# Patient Record
Sex: FEMALE | Race: WHITE | Employment: OTHER | URBAN - METROPOLITAN AREA
[De-identification: names, ages, dates, MRNs, and addresses within clinical notes are randomized per-mention and may not be internally consistent; named-entity substitution may affect disease eponyms.]

---

## 2017-04-03 ENCOUNTER — OFFICE VISIT (OUTPATIENT)
Dept: FAMILY MEDICINE CLINIC | Age: 67
End: 2017-04-03

## 2017-04-03 VITALS
HEIGHT: 63 IN | OXYGEN SATURATION: 98 % | DIASTOLIC BLOOD PRESSURE: 70 MMHG | BODY MASS INDEX: 34.2 KG/M2 | HEART RATE: 82 BPM | SYSTOLIC BLOOD PRESSURE: 110 MMHG | WEIGHT: 193 LBS

## 2017-04-03 DIAGNOSIS — E78.5 HYPERLIPIDEMIA, UNSPECIFIED HYPERLIPIDEMIA TYPE: ICD-10-CM

## 2017-04-03 DIAGNOSIS — E03.9 ACQUIRED HYPOTHYROIDISM: ICD-10-CM

## 2017-04-03 DIAGNOSIS — R06.02 SOB (SHORTNESS OF BREATH): Primary | ICD-10-CM

## 2017-04-03 DIAGNOSIS — D86.9 SARCOID: ICD-10-CM

## 2017-04-03 DIAGNOSIS — I10 ESSENTIAL HYPERTENSION: ICD-10-CM

## 2017-04-03 DIAGNOSIS — Z11.59 NEED FOR HEPATITIS C SCREENING TEST: ICD-10-CM

## 2017-04-03 DIAGNOSIS — Z13.1 SCREENING FOR DIABETES MELLITUS (DM): ICD-10-CM

## 2017-04-03 LAB
A/G RATIO: 1.2 (ref 1.1–2.2)
ALBUMIN SERPL-MCNC: 3.9 G/DL (ref 3.4–5)
ALP BLD-CCNC: 93 U/L (ref 40–129)
ALT SERPL-CCNC: 22 U/L (ref 10–40)
ANION GAP SERPL CALCULATED.3IONS-SCNC: 12 MMOL/L (ref 3–16)
AST SERPL-CCNC: 20 U/L (ref 15–37)
BASOPHILS ABSOLUTE: 0.1 K/UL (ref 0–0.2)
BASOPHILS RELATIVE PERCENT: 1 %
BILIRUB SERPL-MCNC: 0.4 MG/DL (ref 0–1)
BUN BLDV-MCNC: 23 MG/DL (ref 7–20)
CALCIUM SERPL-MCNC: 10 MG/DL (ref 8.3–10.6)
CHLORIDE BLD-SCNC: 100 MMOL/L (ref 99–110)
CO2: 28 MMOL/L (ref 21–32)
CREAT SERPL-MCNC: 0.9 MG/DL (ref 0.6–1.2)
EOSINOPHILS ABSOLUTE: 0.3 K/UL (ref 0–0.6)
EOSINOPHILS RELATIVE PERCENT: 3.6 %
GFR AFRICAN AMERICAN: >60
GFR NON-AFRICAN AMERICAN: >60
GLOBULIN: 3.2 G/DL
GLUCOSE BLD-MCNC: 89 MG/DL (ref 70–99)
HBA1C MFR BLD: 5.6 %
HCT VFR BLD CALC: 45.4 % (ref 36–48)
HEMOGLOBIN: 15 G/DL (ref 12–16)
HEPATITIS C ANTIBODY INTERPRETATION: NORMAL
LYMPHOCYTES ABSOLUTE: 3.6 K/UL (ref 1–5.1)
LYMPHOCYTES RELATIVE PERCENT: 39.4 %
MCH RBC QN AUTO: 32 PG (ref 26–34)
MCHC RBC AUTO-ENTMCNC: 32.9 G/DL (ref 31–36)
MCV RBC AUTO: 97 FL (ref 80–100)
MONOCYTES ABSOLUTE: 0.8 K/UL (ref 0–1.3)
MONOCYTES RELATIVE PERCENT: 8.8 %
NEUTROPHILS ABSOLUTE: 4.3 K/UL (ref 1.7–7.7)
NEUTROPHILS RELATIVE PERCENT: 47.2 %
PDW BLD-RTO: 13.5 % (ref 12.4–15.4)
PLATELET # BLD: 342 K/UL (ref 135–450)
PMV BLD AUTO: 8.6 FL (ref 5–10.5)
POTASSIUM SERPL-SCNC: 4.1 MMOL/L (ref 3.5–5.1)
RBC # BLD: 4.68 M/UL (ref 4–5.2)
SODIUM BLD-SCNC: 140 MMOL/L (ref 136–145)
TOTAL PROTEIN: 7.1 G/DL (ref 6.4–8.2)
TROPONIN: <0.01 NG/ML
TSH REFLEX: 2.93 UIU/ML (ref 0.27–4.2)
WBC # BLD: 9.1 K/UL (ref 4–11)

## 2017-04-03 PROCEDURE — 93000 ELECTROCARDIOGRAM COMPLETE: CPT | Performed by: FAMILY MEDICINE

## 2017-04-03 PROCEDURE — 1036F TOBACCO NON-USER: CPT | Performed by: FAMILY MEDICINE

## 2017-04-03 PROCEDURE — G8427 DOCREV CUR MEDS BY ELIG CLIN: HCPCS | Performed by: FAMILY MEDICINE

## 2017-04-03 PROCEDURE — 3017F COLORECTAL CA SCREEN DOC REV: CPT | Performed by: FAMILY MEDICINE

## 2017-04-03 PROCEDURE — G8417 CALC BMI ABV UP PARAM F/U: HCPCS | Performed by: FAMILY MEDICINE

## 2017-04-03 PROCEDURE — 3014F SCREEN MAMMO DOC REV: CPT | Performed by: FAMILY MEDICINE

## 2017-04-03 PROCEDURE — 1090F PRES/ABSN URINE INCON ASSESS: CPT | Performed by: FAMILY MEDICINE

## 2017-04-03 PROCEDURE — 99203 OFFICE O/P NEW LOW 30 MIN: CPT | Performed by: FAMILY MEDICINE

## 2017-04-03 PROCEDURE — 1123F ACP DISCUSS/DSCN MKR DOCD: CPT | Performed by: FAMILY MEDICINE

## 2017-04-03 PROCEDURE — G8400 PT W/DXA NO RESULTS DOC: HCPCS | Performed by: FAMILY MEDICINE

## 2017-04-03 PROCEDURE — 83036 HEMOGLOBIN GLYCOSYLATED A1C: CPT | Performed by: FAMILY MEDICINE

## 2017-04-03 PROCEDURE — 4040F PNEUMOC VAC/ADMIN/RCVD: CPT | Performed by: FAMILY MEDICINE

## 2017-04-03 RX ORDER — NAPROXEN 500 MG/1
500 TABLET ORAL 2 TIMES DAILY WITH MEALS
COMMUNITY
End: 2019-01-08 | Stop reason: ALTCHOICE

## 2017-04-03 RX ORDER — MONTELUKAST SODIUM 10 MG/1
10 TABLET ORAL NIGHTLY
COMMUNITY
End: 2017-08-15 | Stop reason: SDUPTHER

## 2017-04-03 RX ORDER — SIMVASTATIN 20 MG
20 TABLET ORAL NIGHTLY
Qty: 90 TABLET | Refills: 3 | Status: SHIPPED | OUTPATIENT
Start: 2017-04-03 | End: 2018-03-30 | Stop reason: SDUPTHER

## 2017-04-03 RX ORDER — BUDESONIDE AND FORMOTEROL FUMARATE DIHYDRATE 80; 4.5 UG/1; UG/1
2 AEROSOL RESPIRATORY (INHALATION) 2 TIMES DAILY
Qty: 3 INHALER | Refills: 5 | Status: SHIPPED | OUTPATIENT
Start: 2017-04-03 | End: 2017-07-14 | Stop reason: ALTCHOICE

## 2017-04-03 RX ORDER — LEVOTHYROXINE SODIUM 0.07 MG/1
75 TABLET ORAL DAILY
Qty: 90 TABLET | Refills: 3 | Status: SHIPPED | OUTPATIENT
Start: 2017-04-03 | End: 2018-01-21 | Stop reason: SDUPTHER

## 2017-04-03 RX ORDER — ALBUTEROL SULFATE 90 UG/1
2 AEROSOL, METERED RESPIRATORY (INHALATION) EVERY 6 HOURS PRN
Qty: 1 INHALER | Refills: 3 | Status: SHIPPED | OUTPATIENT
Start: 2017-04-03 | End: 2017-07-14 | Stop reason: ALTCHOICE

## 2017-04-03 ASSESSMENT — ENCOUNTER SYMPTOMS: SHORTNESS OF BREATH: 1

## 2017-04-04 ENCOUNTER — HOSPITAL ENCOUNTER (OUTPATIENT)
Dept: NON INVASIVE DIAGNOSTICS | Age: 67
Discharge: HOME | End: 2017-04-04
Attending: FAMILY MEDICINE | Admitting: FAMILY MEDICINE

## 2017-04-04 DIAGNOSIS — R06.02 SHORTNESS OF BREATH: ICD-10-CM

## 2017-04-26 ENCOUNTER — OFFICE VISIT (OUTPATIENT)
Dept: FAMILY MEDICINE CLINIC | Age: 67
End: 2017-04-26

## 2017-04-26 VITALS
SYSTOLIC BLOOD PRESSURE: 124 MMHG | DIASTOLIC BLOOD PRESSURE: 80 MMHG | WEIGHT: 194 LBS | OXYGEN SATURATION: 96 % | HEART RATE: 67 BPM | BODY MASS INDEX: 34.37 KG/M2

## 2017-04-26 DIAGNOSIS — F33.2 SEVERE EPISODE OF RECURRENT MAJOR DEPRESSIVE DISORDER, WITHOUT PSYCHOTIC FEATURES (HCC): ICD-10-CM

## 2017-04-26 DIAGNOSIS — D86.9 SARCOID: ICD-10-CM

## 2017-04-26 DIAGNOSIS — Z78.0 ASYMPTOMATIC MENOPAUSAL STATE: ICD-10-CM

## 2017-04-26 DIAGNOSIS — E03.9 ACQUIRED HYPOTHYROIDISM: ICD-10-CM

## 2017-04-26 DIAGNOSIS — I10 ESSENTIAL HYPERTENSION: Primary | ICD-10-CM

## 2017-04-26 DIAGNOSIS — E78.5 HYPERLIPIDEMIA, UNSPECIFIED HYPERLIPIDEMIA TYPE: ICD-10-CM

## 2017-04-26 DIAGNOSIS — Z23 NEED FOR STREPTOCOCCUS PNEUMONIAE AND INFLUENZA VACCINATION: ICD-10-CM

## 2017-04-26 LAB
CHOLESTEROL, TOTAL: 179 MG/DL (ref 0–199)
HDLC SERPL-MCNC: 55 MG/DL (ref 40–60)
LDL CHOLESTEROL CALCULATED: 91 MG/DL
TRIGL SERPL-MCNC: 165 MG/DL (ref 0–150)
VLDLC SERPL CALC-MCNC: 33 MG/DL

## 2017-04-26 PROCEDURE — 90670 PCV13 VACCINE IM: CPT | Performed by: FAMILY MEDICINE

## 2017-04-26 PROCEDURE — 1036F TOBACCO NON-USER: CPT | Performed by: FAMILY MEDICINE

## 2017-04-26 PROCEDURE — 4040F PNEUMOC VAC/ADMIN/RCVD: CPT | Performed by: FAMILY MEDICINE

## 2017-04-26 PROCEDURE — 1090F PRES/ABSN URINE INCON ASSESS: CPT | Performed by: FAMILY MEDICINE

## 2017-04-26 PROCEDURE — 3017F COLORECTAL CA SCREEN DOC REV: CPT | Performed by: FAMILY MEDICINE

## 2017-04-26 PROCEDURE — G8428 CUR MEDS NOT DOCUMENT: HCPCS | Performed by: FAMILY MEDICINE

## 2017-04-26 PROCEDURE — 99214 OFFICE O/P EST MOD 30 MIN: CPT | Performed by: FAMILY MEDICINE

## 2017-04-26 PROCEDURE — G0009 ADMIN PNEUMOCOCCAL VACCINE: HCPCS | Performed by: FAMILY MEDICINE

## 2017-04-26 PROCEDURE — G8417 CALC BMI ABV UP PARAM F/U: HCPCS | Performed by: FAMILY MEDICINE

## 2017-04-26 PROCEDURE — 3014F SCREEN MAMMO DOC REV: CPT | Performed by: FAMILY MEDICINE

## 2017-04-26 PROCEDURE — 1123F ACP DISCUSS/DSCN MKR DOCD: CPT | Performed by: FAMILY MEDICINE

## 2017-04-26 PROCEDURE — G8400 PT W/DXA NO RESULTS DOC: HCPCS | Performed by: FAMILY MEDICINE

## 2017-04-26 RX ORDER — ESCITALOPRAM OXALATE 10 MG/1
10 TABLET ORAL DAILY
Qty: 90 TABLET | Refills: 3 | Status: SHIPPED | OUTPATIENT
Start: 2017-04-26 | End: 2018-04-21 | Stop reason: SDUPTHER

## 2017-04-26 RX ORDER — LOSARTAN POTASSIUM AND HYDROCHLOROTHIAZIDE 25; 100 MG/1; MG/1
1 TABLET ORAL DAILY
COMMUNITY
End: 2017-04-26 | Stop reason: SDUPTHER

## 2017-04-26 RX ORDER — LOSARTAN POTASSIUM AND HYDROCHLOROTHIAZIDE 25; 100 MG/1; MG/1
1 TABLET ORAL DAILY
Qty: 90 TABLET | Refills: 3 | Status: SHIPPED | OUTPATIENT
Start: 2017-04-26 | End: 2018-04-21 | Stop reason: SDUPTHER

## 2017-04-26 RX ORDER — OMEPRAZOLE 20 MG/1
20 CAPSULE, DELAYED RELEASE ORAL DAILY
COMMUNITY

## 2017-04-26 ASSESSMENT — PATIENT HEALTH QUESTIONNAIRE - PHQ9
SUM OF ALL RESPONSES TO PHQ9 QUESTIONS 1 & 2: 0
1. LITTLE INTEREST OR PLEASURE IN DOING THINGS: 0
2. FEELING DOWN, DEPRESSED OR HOPELESS: 0
SUM OF ALL RESPONSES TO PHQ QUESTIONS 1-9: 0

## 2017-04-26 ASSESSMENT — ENCOUNTER SYMPTOMS: SHORTNESS OF BREATH: 1

## 2017-05-11 ENCOUNTER — TELEPHONE (OUTPATIENT)
Dept: PULMONOLOGY | Age: 67
End: 2017-05-11

## 2017-05-11 DIAGNOSIS — D86.9 SARCOID: Primary | ICD-10-CM

## 2017-05-12 ENCOUNTER — HOSPITAL ENCOUNTER (OUTPATIENT)
Dept: MAMMOGRAPHY | Age: 67
Discharge: HOME | End: 2017-05-12
Attending: FAMILY MEDICINE | Admitting: FAMILY MEDICINE

## 2017-05-12 DIAGNOSIS — Z12.31 ENCOUNTER FOR SCREENING MAMMOGRAM FOR BREAST CANCER: ICD-10-CM

## 2017-06-12 ENCOUNTER — HOSPITAL ENCOUNTER (OUTPATIENT)
Dept: PULMONOLOGY | Age: 67
Discharge: HOME | End: 2017-06-12
Attending: INTERNAL MEDICINE | Admitting: INTERNAL MEDICINE

## 2017-06-12 DIAGNOSIS — D86.9 SARCOID: ICD-10-CM

## 2017-06-12 DIAGNOSIS — D86.9 SARCOIDOSIS: ICD-10-CM

## 2017-06-12 RX ORDER — ALBUTEROL SULFATE 90 UG/1
6 AEROSOL, METERED RESPIRATORY (INHALATION) ONCE
Status: COMPLETED | OUTPATIENT
Start: 2017-06-12 | End: 2017-06-12

## 2017-06-12 RX ADMIN — ALBUTEROL SULFATE 6 PUFF: 90 AEROSOL, METERED RESPIRATORY (INHALATION) at 10:57

## 2017-06-19 ENCOUNTER — OFFICE VISIT (OUTPATIENT)
Dept: PULMONOLOGY | Age: 67
End: 2017-06-19

## 2017-06-19 VITALS
SYSTOLIC BLOOD PRESSURE: 124 MMHG | HEIGHT: 63 IN | RESPIRATION RATE: 16 BRPM | BODY MASS INDEX: 34.2 KG/M2 | WEIGHT: 193 LBS | TEMPERATURE: 98 F | HEART RATE: 68 BPM | OXYGEN SATURATION: 98 % | DIASTOLIC BLOOD PRESSURE: 86 MMHG

## 2017-06-19 DIAGNOSIS — R06.00 DYSPNEA, UNSPECIFIED TYPE: ICD-10-CM

## 2017-06-19 DIAGNOSIS — D86.9 SARCOIDOSIS: Primary | ICD-10-CM

## 2017-06-19 DIAGNOSIS — D86.9 SARCOID: ICD-10-CM

## 2017-06-19 PROCEDURE — 99204 OFFICE O/P NEW MOD 45 MIN: CPT | Performed by: INTERNAL MEDICINE

## 2017-06-19 PROCEDURE — G8400 PT W/DXA NO RESULTS DOC: HCPCS | Performed by: INTERNAL MEDICINE

## 2017-06-19 PROCEDURE — 4040F PNEUMOC VAC/ADMIN/RCVD: CPT | Performed by: INTERNAL MEDICINE

## 2017-06-19 PROCEDURE — 1090F PRES/ABSN URINE INCON ASSESS: CPT | Performed by: INTERNAL MEDICINE

## 2017-06-19 PROCEDURE — 1123F ACP DISCUSS/DSCN MKR DOCD: CPT | Performed by: INTERNAL MEDICINE

## 2017-06-19 PROCEDURE — 1036F TOBACCO NON-USER: CPT | Performed by: INTERNAL MEDICINE

## 2017-06-19 PROCEDURE — 3017F COLORECTAL CA SCREEN DOC REV: CPT | Performed by: INTERNAL MEDICINE

## 2017-06-19 PROCEDURE — G8427 DOCREV CUR MEDS BY ELIG CLIN: HCPCS | Performed by: INTERNAL MEDICINE

## 2017-06-19 PROCEDURE — 3014F SCREEN MAMMO DOC REV: CPT | Performed by: INTERNAL MEDICINE

## 2017-06-19 PROCEDURE — G8417 CALC BMI ABV UP PARAM F/U: HCPCS | Performed by: INTERNAL MEDICINE

## 2017-06-23 ENCOUNTER — HOSPITAL ENCOUNTER (OUTPATIENT)
Dept: CT IMAGING | Age: 67
Discharge: HOME | End: 2017-06-23
Attending: INTERNAL MEDICINE | Admitting: INTERNAL MEDICINE

## 2017-06-23 DIAGNOSIS — D86.9 SARCOIDOSIS: ICD-10-CM

## 2017-06-23 LAB
ANION GAP SERPL CALCULATED.3IONS-SCNC: 12 MMOL/L (ref 3–16)
BUN BLDV-MCNC: 22 MG/DL (ref 7–20)
CALCIUM SERPL-MCNC: 9.3 MG/DL (ref 8.3–10.6)
CHLORIDE BLD-SCNC: 100 MMOL/L (ref 99–110)
CO2: 29 MMOL/L (ref 21–32)
CREAT SERPL-MCNC: 0.7 MG/DL (ref 0.6–1.2)
GFR AFRICAN AMERICAN: >60
GFR NON-AFRICAN AMERICAN: >60
GLUCOSE BLD-MCNC: 96 MG/DL (ref 70–99)
POTASSIUM SERPL-SCNC: 3.7 MMOL/L (ref 3.5–5.1)
SODIUM BLD-SCNC: 141 MMOL/L (ref 136–145)

## 2017-07-06 ENCOUNTER — OFFICE VISIT (OUTPATIENT)
Dept: PULMONOLOGY | Age: 67
End: 2017-07-06

## 2017-07-06 VITALS
TEMPERATURE: 98.7 F | SYSTOLIC BLOOD PRESSURE: 122 MMHG | RESPIRATION RATE: 16 BRPM | DIASTOLIC BLOOD PRESSURE: 88 MMHG | WEIGHT: 193 LBS | OXYGEN SATURATION: 97 % | BODY MASS INDEX: 34.2 KG/M2 | HEIGHT: 63 IN | HEART RATE: 75 BPM

## 2017-07-06 DIAGNOSIS — D86.9 SARCOIDOSIS: Primary | ICD-10-CM

## 2017-07-06 DIAGNOSIS — R06.00 DYSPNEA, UNSPECIFIED TYPE: ICD-10-CM

## 2017-07-06 PROCEDURE — G8417 CALC BMI ABV UP PARAM F/U: HCPCS | Performed by: INTERNAL MEDICINE

## 2017-07-06 PROCEDURE — 99213 OFFICE O/P EST LOW 20 MIN: CPT | Performed by: INTERNAL MEDICINE

## 2017-07-06 PROCEDURE — 3014F SCREEN MAMMO DOC REV: CPT | Performed by: INTERNAL MEDICINE

## 2017-07-06 PROCEDURE — 1123F ACP DISCUSS/DSCN MKR DOCD: CPT | Performed by: INTERNAL MEDICINE

## 2017-07-06 PROCEDURE — 3017F COLORECTAL CA SCREEN DOC REV: CPT | Performed by: INTERNAL MEDICINE

## 2017-07-06 PROCEDURE — 1036F TOBACCO NON-USER: CPT | Performed by: INTERNAL MEDICINE

## 2017-07-06 PROCEDURE — 1090F PRES/ABSN URINE INCON ASSESS: CPT | Performed by: INTERNAL MEDICINE

## 2017-07-06 PROCEDURE — G8427 DOCREV CUR MEDS BY ELIG CLIN: HCPCS | Performed by: INTERNAL MEDICINE

## 2017-07-06 PROCEDURE — 4040F PNEUMOC VAC/ADMIN/RCVD: CPT | Performed by: INTERNAL MEDICINE

## 2017-07-06 PROCEDURE — G8400 PT W/DXA NO RESULTS DOC: HCPCS | Performed by: INTERNAL MEDICINE

## 2017-07-27 ENCOUNTER — HOSPITAL ENCOUNTER (OUTPATIENT)
Dept: MRI IMAGING | Age: 67
Discharge: HOME | End: 2017-07-27
Attending: PHYSICAL MEDICINE & REHABILITATION | Admitting: PHYSICAL MEDICINE & REHABILITATION

## 2017-07-27 DIAGNOSIS — M48.061 LUMBAR STENOSIS: ICD-10-CM

## 2017-07-27 DIAGNOSIS — M54.40 CHRONIC LOW BACK PAIN WITH SCIATICA, SCIATICA LATERALITY UNSPECIFIED, UNSPECIFIED BACK PAIN LATERALITY: ICD-10-CM

## 2017-07-27 DIAGNOSIS — M54.50 LOW BACK PAIN: ICD-10-CM

## 2017-07-27 DIAGNOSIS — M54.16 LUMBAR RADICULOPATHY: ICD-10-CM

## 2017-07-27 DIAGNOSIS — G89.29 CHRONIC LOW BACK PAIN WITH SCIATICA, SCIATICA LATERALITY UNSPECIFIED, UNSPECIFIED BACK PAIN LATERALITY: ICD-10-CM

## 2017-08-15 ENCOUNTER — HOSPITAL ENCOUNTER (OUTPATIENT)
Dept: VASCULAR LAB | Age: 67
Discharge: HOME | End: 2017-08-15
Attending: FAMILY MEDICINE | Admitting: FAMILY MEDICINE

## 2017-08-15 ENCOUNTER — OFFICE VISIT (OUTPATIENT)
Dept: FAMILY MEDICINE CLINIC | Age: 67
End: 2017-08-15

## 2017-08-15 VITALS
RESPIRATION RATE: 12 BRPM | HEIGHT: 63 IN | DIASTOLIC BLOOD PRESSURE: 82 MMHG | SYSTOLIC BLOOD PRESSURE: 126 MMHG | BODY MASS INDEX: 33.63 KG/M2 | HEART RATE: 71 BPM | WEIGHT: 189.8 LBS | OXYGEN SATURATION: 97 %
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
## 2017-08-15 DIAGNOSIS — M79.89 OTHER DISORDERS OF SOFT TISSUE: ICD-10-CM

## 2017-08-15 DIAGNOSIS — M79.89 SWELLING OF THIGH: Primary | ICD-10-CM

## 2017-08-15 DIAGNOSIS — Z23 NEED FOR INFLUENZA VACCINATION: ICD-10-CM

## 2017-08-15 PROCEDURE — 1123F ACP DISCUSS/DSCN MKR DOCD: CPT | Performed by: FAMILY MEDICINE

## 2017-08-15 PROCEDURE — 90662 IIV NO PRSV INCREASED AG IM: CPT | Performed by: FAMILY MEDICINE

## 2017-08-15 PROCEDURE — 99214 OFFICE O/P EST MOD 30 MIN: CPT | Performed by: FAMILY MEDICINE

## 2017-08-15 PROCEDURE — 3017F COLORECTAL CA SCREEN DOC REV: CPT | Performed by: FAMILY MEDICINE

## 2017-08-15 PROCEDURE — G8417 CALC BMI ABV UP PARAM F/U: HCPCS | Performed by: FAMILY MEDICINE

## 2017-08-15 PROCEDURE — G0008 ADMIN INFLUENZA VIRUS VAC: HCPCS | Performed by: FAMILY MEDICINE

## 2017-08-15 PROCEDURE — 1036F TOBACCO NON-USER: CPT | Performed by: FAMILY MEDICINE

## 2017-08-15 PROCEDURE — G8400 PT W/DXA NO RESULTS DOC: HCPCS | Performed by: FAMILY MEDICINE

## 2017-08-15 PROCEDURE — G8427 DOCREV CUR MEDS BY ELIG CLIN: HCPCS | Performed by: FAMILY MEDICINE

## 2017-08-15 PROCEDURE — 4040F PNEUMOC VAC/ADMIN/RCVD: CPT | Performed by: FAMILY MEDICINE

## 2017-08-15 PROCEDURE — 1090F PRES/ABSN URINE INCON ASSESS: CPT | Performed by: FAMILY MEDICINE

## 2017-08-15 PROCEDURE — 3014F SCREEN MAMMO DOC REV: CPT | Performed by: FAMILY MEDICINE

## 2017-08-15 RX ORDER — FUROSEMIDE 40 MG/1
40 TABLET ORAL DAILY
Qty: 7 TABLET | Refills: 0 | Status: SHIPPED | OUTPATIENT
Start: 2017-08-15 | End: 2018-08-17 | Stop reason: ALTCHOICE

## 2017-08-15 RX ORDER — MONTELUKAST SODIUM 10 MG/1
10 TABLET ORAL NIGHTLY
Qty: 90 TABLET | Refills: 0 | Status: SHIPPED | OUTPATIENT
Start: 2017-08-15 | End: 2017-11-13 | Stop reason: SDUPTHER

## 2017-08-25 ENCOUNTER — OFFICE VISIT (OUTPATIENT)
Dept: FAMILY MEDICINE CLINIC | Age: 67
End: 2017-08-25

## 2017-08-25 VITALS
BODY MASS INDEX: 33.88 KG/M2 | HEIGHT: 63 IN | DIASTOLIC BLOOD PRESSURE: 76 MMHG | SYSTOLIC BLOOD PRESSURE: 120 MMHG | OXYGEN SATURATION: 96 % | RESPIRATION RATE: 16 BRPM | HEART RATE: 76 BPM | WEIGHT: 191.2 LBS

## 2017-08-25 DIAGNOSIS — Z12.11 SCREENING FOR COLON CANCER: Primary | ICD-10-CM

## 2017-08-25 DIAGNOSIS — M79.89 SWELLING OF THIGH: ICD-10-CM

## 2017-08-25 PROCEDURE — 3014F SCREEN MAMMO DOC REV: CPT | Performed by: FAMILY MEDICINE

## 2017-08-25 PROCEDURE — 1123F ACP DISCUSS/DSCN MKR DOCD: CPT | Performed by: FAMILY MEDICINE

## 2017-08-25 PROCEDURE — 1036F TOBACCO NON-USER: CPT | Performed by: FAMILY MEDICINE

## 2017-08-25 PROCEDURE — 3017F COLORECTAL CA SCREEN DOC REV: CPT | Performed by: FAMILY MEDICINE

## 2017-08-25 PROCEDURE — 99213 OFFICE O/P EST LOW 20 MIN: CPT | Performed by: FAMILY MEDICINE

## 2017-08-25 PROCEDURE — 1090F PRES/ABSN URINE INCON ASSESS: CPT | Performed by: FAMILY MEDICINE

## 2017-08-25 PROCEDURE — G8427 DOCREV CUR MEDS BY ELIG CLIN: HCPCS | Performed by: FAMILY MEDICINE

## 2017-08-25 PROCEDURE — 4040F PNEUMOC VAC/ADMIN/RCVD: CPT | Performed by: FAMILY MEDICINE

## 2017-08-25 PROCEDURE — G8400 PT W/DXA NO RESULTS DOC: HCPCS | Performed by: FAMILY MEDICINE

## 2017-08-25 PROCEDURE — G8417 CALC BMI ABV UP PARAM F/U: HCPCS | Performed by: FAMILY MEDICINE

## 2017-08-25 RX ORDER — FUROSEMIDE 40 MG/1
40 TABLET ORAL DAILY
Qty: 7 TABLET | Refills: 0 | Status: CANCELLED | OUTPATIENT
Start: 2017-08-25

## 2017-09-26 ENCOUNTER — OFFICE VISIT (OUTPATIENT)
Dept: FAMILY MEDICINE CLINIC | Age: 67
End: 2017-09-26

## 2017-09-26 VITALS
BODY MASS INDEX: 33.66 KG/M2 | TEMPERATURE: 98 F | WEIGHT: 190 LBS | DIASTOLIC BLOOD PRESSURE: 70 MMHG | OXYGEN SATURATION: 97 % | SYSTOLIC BLOOD PRESSURE: 134 MMHG | HEART RATE: 66 BPM | HEIGHT: 63 IN

## 2017-09-26 DIAGNOSIS — Z00.00 ROUTINE GENERAL MEDICAL EXAMINATION AT A HEALTH CARE FACILITY: Primary | ICD-10-CM

## 2017-09-26 PROCEDURE — G0438 PPPS, INITIAL VISIT: HCPCS | Performed by: FAMILY MEDICINE

## 2017-09-26 ASSESSMENT — PATIENT HEALTH QUESTIONNAIRE - PHQ9
SUM OF ALL RESPONSES TO PHQ9 QUESTIONS 1 & 2: 0
1. LITTLE INTEREST OR PLEASURE IN DOING THINGS: 0
SUM OF ALL RESPONSES TO PHQ QUESTIONS 1-9: 0
2. FEELING DOWN, DEPRESSED OR HOPELESS: 0

## 2017-11-13 RX ORDER — MONTELUKAST SODIUM 10 MG/1
TABLET ORAL
Qty: 90 TABLET | Refills: 0 | Status: SHIPPED | OUTPATIENT
Start: 2017-11-13 | End: 2018-02-11 | Stop reason: SDUPTHER

## 2017-11-13 NOTE — TELEPHONE ENCOUNTER
Last office visit 9/26/2017     Last written 8/15/17       Next office visit scheduled Visit date not found    Requested Prescriptions     Pending Prescriptions Disp Refills    montelukast (SINGULAIR) 10 MG tablet [Pharmacy Med Name: MONTELUKAST SOD TABS 10MG] 90 tablet 0     Sig: TAKE 1 TABLET NIGHTLY

## 2018-01-22 RX ORDER — LEVOTHYROXINE SODIUM 0.07 MG/1
TABLET ORAL
Qty: 90 TABLET | Refills: 3 | Status: SHIPPED | OUTPATIENT
Start: 2018-01-22 | End: 2019-01-16 | Stop reason: SDUPTHER

## 2018-02-12 RX ORDER — MONTELUKAST SODIUM 10 MG/1
TABLET ORAL
Qty: 90 TABLET | Refills: 0 | Status: SHIPPED | OUTPATIENT
Start: 2018-02-12 | End: 2018-05-12 | Stop reason: SDUPTHER

## 2018-02-12 NOTE — TELEPHONE ENCOUNTER
Last office visit 9/26/2017     Last written 11/13/17     Next office visit scheduled Visit date not found    Requested Prescriptions     Pending Prescriptions Disp Refills    montelukast (SINGULAIR) 10 MG tablet [Pharmacy Med Name: MONTELUKAST SOD TABS 10MG] 90 tablet 0     Sig: TAKE 1 TABLET NIGHTLY

## 2018-02-26 ENCOUNTER — OFFICE VISIT (OUTPATIENT)
Dept: ORTHOPEDIC SURGERY | Age: 68
End: 2018-02-26

## 2018-02-26 VITALS
DIASTOLIC BLOOD PRESSURE: 70 MMHG | BODY MASS INDEX: 32.78 KG/M2 | HEIGHT: 63 IN | WEIGHT: 185 LBS | SYSTOLIC BLOOD PRESSURE: 130 MMHG

## 2018-02-26 DIAGNOSIS — M75.42 ROTATOR CUFF IMPINGEMENT SYNDROME OF LEFT SHOULDER: ICD-10-CM

## 2018-02-26 DIAGNOSIS — D49.9 NEOPLASIA: ICD-10-CM

## 2018-02-26 DIAGNOSIS — M25.512 ACUTE PAIN OF LEFT SHOULDER: Primary | ICD-10-CM

## 2018-02-26 PROBLEM — G89.29 CHRONIC LEFT SHOULDER PAIN: Status: ACTIVE | Noted: 2018-02-26

## 2018-02-26 PROCEDURE — G8400 PT W/DXA NO RESULTS DOC: HCPCS | Performed by: ORTHOPAEDIC SURGERY

## 2018-02-26 PROCEDURE — 4040F PNEUMOC VAC/ADMIN/RCVD: CPT | Performed by: ORTHOPAEDIC SURGERY

## 2018-02-26 PROCEDURE — G8484 FLU IMMUNIZE NO ADMIN: HCPCS | Performed by: ORTHOPAEDIC SURGERY

## 2018-02-26 PROCEDURE — 99203 OFFICE O/P NEW LOW 30 MIN: CPT | Performed by: ORTHOPAEDIC SURGERY

## 2018-02-26 PROCEDURE — 1090F PRES/ABSN URINE INCON ASSESS: CPT | Performed by: ORTHOPAEDIC SURGERY

## 2018-02-26 PROCEDURE — G8417 CALC BMI ABV UP PARAM F/U: HCPCS | Performed by: ORTHOPAEDIC SURGERY

## 2018-02-26 PROCEDURE — 1036F TOBACCO NON-USER: CPT | Performed by: ORTHOPAEDIC SURGERY

## 2018-02-26 PROCEDURE — 3014F SCREEN MAMMO DOC REV: CPT | Performed by: ORTHOPAEDIC SURGERY

## 2018-02-26 PROCEDURE — 1123F ACP DISCUSS/DSCN MKR DOCD: CPT | Performed by: ORTHOPAEDIC SURGERY

## 2018-02-26 PROCEDURE — G8427 DOCREV CUR MEDS BY ELIG CLIN: HCPCS | Performed by: ORTHOPAEDIC SURGERY

## 2018-02-26 PROCEDURE — 3017F COLORECTAL CA SCREEN DOC REV: CPT | Performed by: ORTHOPAEDIC SURGERY

## 2018-02-26 RX ORDER — MAGNESIUM 30 MG
30 TABLET ORAL 2 TIMES DAILY
COMMUNITY
End: 2022-10-26

## 2018-02-26 NOTE — LETTER
 escitalopram (LEXAPRO) 10 MG tablet Take 1 tablet by mouth daily 90 tablet 3    aspirin 81 MG tablet Take 81 mg by mouth daily      Misc Natural Products (FIBER 7 PO) Take by mouth      simvastatin (ZOCOR) 20 MG tablet Take 1 tablet by mouth nightly 90 tablet 3    naproxen (NAPROSYN) 500 MG tablet Take 500 mg by mouth 2 times daily (with meals)       No current facility-administered medications for this visit. Social    Social History     Social History    Marital status:      Spouse name: N/A    Number of children: N/A    Years of education: N/A     Occupational History    Not on file. Social History Main Topics    Smoking status: Never Smoker    Smokeless tobacco: Never Used    Alcohol use No    Drug use: No    Sexual activity: Not on file     Other Topics Concern    Not on file     Social History Narrative    No narrative on file       Family HISTORY    Family History   Problem Relation Age of Onset    Hypertension Mother     Diabetes Father     Cancer Son      testicular    Asthma Neg Hx     Emphysema Neg Hx     Heart Failure Neg Hx        PHYSICAL EXAM    Vital Signs:  /70   Ht 5' 3\" (1.6 m)   Wt 185 lb (83.9 kg)   LMP  (LMP Unknown)   BMI 32.77 kg/m²    General Appearance:  Normal body habitus. Alert and oriented to person, place, and time. Affect:  Normal.   Skin:  Intact. Sensation:  Intact. Strength:  Intact. Reflexes:  Intact. Pulses:  Intact. Left Shoulder Exam  She has a full range of motion of the neck. Examination of the shoulder reveals:  Pain to palpation over the distal clavicle at the a.c. Joint without deformity. She has no tenderness over the proximal biceps. She has a full active range of motion of the elbow, wrist and hand. Range of motion  (in degrees)   Right Left   ABDUCTION       EXT. ROTATION       INT.  ROTATION       FORWARD FLEX    STRENGTH         Provocative Test Positive Negative Not indicated

## 2018-03-15 ENCOUNTER — OFFICE VISIT (OUTPATIENT)
Dept: ORTHOPEDIC SURGERY | Age: 68
End: 2018-03-15

## 2018-03-15 VITALS
HEART RATE: 72 BPM | BODY MASS INDEX: 32.77 KG/M2 | DIASTOLIC BLOOD PRESSURE: 72 MMHG | SYSTOLIC BLOOD PRESSURE: 117 MMHG | HEIGHT: 63 IN | WEIGHT: 184.97 LBS

## 2018-03-15 DIAGNOSIS — M19.019 ACROMIOCLAVICULAR JOINT ARTHRITIS: ICD-10-CM

## 2018-03-15 DIAGNOSIS — M25.512 LEFT SHOULDER PAIN, UNSPECIFIED CHRONICITY: Primary | ICD-10-CM

## 2018-03-15 PROCEDURE — 1036F TOBACCO NON-USER: CPT | Performed by: ORTHOPAEDIC SURGERY

## 2018-03-15 PROCEDURE — 3017F COLORECTAL CA SCREEN DOC REV: CPT | Performed by: ORTHOPAEDIC SURGERY

## 2018-03-15 PROCEDURE — 3014F SCREEN MAMMO DOC REV: CPT | Performed by: ORTHOPAEDIC SURGERY

## 2018-03-15 PROCEDURE — 1123F ACP DISCUSS/DSCN MKR DOCD: CPT | Performed by: ORTHOPAEDIC SURGERY

## 2018-03-15 PROCEDURE — G8427 DOCREV CUR MEDS BY ELIG CLIN: HCPCS | Performed by: ORTHOPAEDIC SURGERY

## 2018-03-15 PROCEDURE — 1090F PRES/ABSN URINE INCON ASSESS: CPT | Performed by: ORTHOPAEDIC SURGERY

## 2018-03-15 PROCEDURE — G8417 CALC BMI ABV UP PARAM F/U: HCPCS | Performed by: ORTHOPAEDIC SURGERY

## 2018-03-15 PROCEDURE — G8482 FLU IMMUNIZE ORDER/ADMIN: HCPCS | Performed by: ORTHOPAEDIC SURGERY

## 2018-03-15 PROCEDURE — 4040F PNEUMOC VAC/ADMIN/RCVD: CPT | Performed by: ORTHOPAEDIC SURGERY

## 2018-03-15 PROCEDURE — 99213 OFFICE O/P EST LOW 20 MIN: CPT | Performed by: ORTHOPAEDIC SURGERY

## 2018-03-15 PROCEDURE — G8400 PT W/DXA NO RESULTS DOC: HCPCS | Performed by: ORTHOPAEDIC SURGERY

## 2018-03-15 RX ORDER — METHYLPREDNISOLONE 4 MG/1
TABLET ORAL
Qty: 1 KIT | Refills: 0 | Status: SHIPPED | OUTPATIENT
Start: 2018-03-15 | End: 2018-08-17 | Stop reason: ALTCHOICE

## 2018-03-15 RX ORDER — MELOXICAM 15 MG/1
15 TABLET ORAL DAILY
Qty: 30 TABLET | Refills: 3 | Status: SHIPPED | OUTPATIENT
Start: 2018-03-15 | End: 2018-08-17

## 2018-03-15 NOTE — PROGRESS NOTES
I am evaluating this patient as a consult at the request of Maru Alberts M.D. Chief Complaint:  Shoulder Pain (LEFT SHOULDER REF BY CD)      History of Present Illness:  Frieda Peters is a  79 y.o. right hand dominant female here regarding left shoulder pain, she sustained a fall walking out of her house in January landing directly on the left arm and shoulder, since then she has had superior pain at the a.c. joint, she denies loss of motion or strength, she has not had any conservative treatment so far. She does receive steroid injections to her back which is been helpful, she has had a steroid injection to her knee which has not helped. She is currently retired, enjoys [employer], fishing and playing [employer], she was referred to discuss treatment options with me by Dr. Compa Anguiano. Pain Assessment:  Pain Assessment  Location of Pain: Shoulder  Location Modifiers: Left  Severity of Pain: 8  Quality of Pain: Aching  Duration of Pain: Persistent  Frequency of Pain: Constant    Medical History:  Past Medical History:   Diagnosis Date    Acromioclavicular joint arthritis 3/15/2018    Chronic back pain     Depression     w/Anxiety    HTN (hypertension)     Hypothyroid     Sarcoidosis ([facility] [address]      Past Surgical History:   Procedure Laterality Date    BRONCHOSCOPY       SECTION      LUNG BIOPSY       Social History     Social History    Marital status:       Spouse name: N/A    Number of children: N/A    Years of education: N/A     Social History Main Topics    Smoking status: Never Smoker    Smokeless tobacco: Never Used    Alcohol use No    Drug use: No    Sexual activity: Not Asked     Other Topics Concern    None     Social History Narrative    None     Allergies   Allergen Reactions    Codeine        Review of Systems:  Constitutional: negative  Respiratory: negative  Cardiovascular: negative  Musculoskeletal:negative except for Shoulder Pain (LEFT SHOULDER REF BY CDM)    Relevant review of systems reviewed and available in the patient's chart in media tab    Vital Signs:  Vitals:    03/15/18 0801   BP: 117/72   Pulse: 72   Weight: 184 lb 15.5 oz (83.9 kg)   Height: 5' 2.99\" (1.6 m)         General Exam:   Constitutional: Patient is adequately groomed with no evidence of malnutrition  Vascular: Examination reveals no swelling or calf tenderness. Peripheral pulses are palpable and 2+. Neurological: The patient has good coordination. There is no weakness or sensory deficit. PHYSICAL EXAMINATION: Inspection of the left shoulder reveals warm, dry, intact skin. There is no adenopathy. The distal neurovascular exam is grossly intact. Range of motion is 150° of active forward flexion, external rotation 30° with her arm at her side, internal rotation to her back pocket which does elicit pain, she has tenderness to palpation at the a.c. joint, she has pain in the a.c. joint with cross body test, he was kneed, infraspinatus and subscapularis tests are negative for pain, 4+5 strength. Provocative SLAP, biceps tension, apprehension tests are negative. strength is graded 5/5 in all other muscle groups. Examination of the contralateral shoulder reveals no atrophy or deformity. The skin is warm and dry. Range of motion is within normal limits. There is no focal tenderness with palpation. Provocative SLAP, biceps tension, apprehension AC joint or rotator cuff tests are negative. Strength is graded 5/5 in all muscle groups. The distal neurovascular exam is grossly intact. Cervical spine: The skin is warm and dry. There is no swelling, warmth, or erythema. Range of motion is within normal limits. There is no paraspinal or spinous process tenderness. Spurling's sign is negative and did not produce shoulder pain. The distal neurovascular exam is grossly intact. Additional Comments: Sensation is intact to light touch throughout the median, ulnar and radial nerve distribution. her knees in the past without relief, she is not interested in pursuing an injection to the shoulder. She'll follow up in 2 weeks for reevaluation, if she has not seen significant improvement we'll proceed with surgical intervention. Patient agrees with this plan, all of their questions were answered best of our ability and to their satisfaction.        Akanksha Varma

## 2018-03-29 ENCOUNTER — OFFICE VISIT (OUTPATIENT)
Dept: ORTHOPEDIC SURGERY | Age: 68
End: 2018-03-29

## 2018-03-29 VITALS
BODY MASS INDEX: 32.77 KG/M2 | WEIGHT: 184.97 LBS | HEIGHT: 63 IN | SYSTOLIC BLOOD PRESSURE: 113 MMHG | DIASTOLIC BLOOD PRESSURE: 63 MMHG | HEART RATE: 79 BPM

## 2018-03-29 DIAGNOSIS — M19.019 ACROMIOCLAVICULAR JOINT ARTHRITIS: Primary | ICD-10-CM

## 2018-03-29 DIAGNOSIS — M75.42 ROTATOR CUFF IMPINGEMENT SYNDROME OF LEFT SHOULDER: ICD-10-CM

## 2018-03-29 PROCEDURE — G8400 PT W/DXA NO RESULTS DOC: HCPCS | Performed by: ORTHOPAEDIC SURGERY

## 2018-03-29 PROCEDURE — 3014F SCREEN MAMMO DOC REV: CPT | Performed by: ORTHOPAEDIC SURGERY

## 2018-03-29 PROCEDURE — 3017F COLORECTAL CA SCREEN DOC REV: CPT | Performed by: ORTHOPAEDIC SURGERY

## 2018-03-29 PROCEDURE — 1090F PRES/ABSN URINE INCON ASSESS: CPT | Performed by: ORTHOPAEDIC SURGERY

## 2018-03-29 PROCEDURE — 1036F TOBACCO NON-USER: CPT | Performed by: ORTHOPAEDIC SURGERY

## 2018-03-29 PROCEDURE — 4040F PNEUMOC VAC/ADMIN/RCVD: CPT | Performed by: ORTHOPAEDIC SURGERY

## 2018-03-29 PROCEDURE — 1123F ACP DISCUSS/DSCN MKR DOCD: CPT | Performed by: ORTHOPAEDIC SURGERY

## 2018-03-29 PROCEDURE — G8482 FLU IMMUNIZE ORDER/ADMIN: HCPCS | Performed by: ORTHOPAEDIC SURGERY

## 2018-03-29 PROCEDURE — G8417 CALC BMI ABV UP PARAM F/U: HCPCS | Performed by: ORTHOPAEDIC SURGERY

## 2018-03-29 PROCEDURE — 99213 OFFICE O/P EST LOW 20 MIN: CPT | Performed by: ORTHOPAEDIC SURGERY

## 2018-03-29 PROCEDURE — G8427 DOCREV CUR MEDS BY ELIG CLIN: HCPCS | Performed by: ORTHOPAEDIC SURGERY

## 2018-03-29 RX ORDER — MELOXICAM 15 MG/1
15 TABLET ORAL DAILY
Qty: 30 TABLET | Refills: 3 | Status: SHIPPED | OUTPATIENT
Start: 2018-03-29 | End: 2018-09-04 | Stop reason: SDUPTHER

## 2018-03-29 NOTE — PROGRESS NOTES
with the patient about the pathophysiology of a.c. joint arthritis rotator cuff tendinitis and their treatment options. The 1st option would be to pursue no further treatment and continue living with their shoulder pain and dysfunction. The 2nd treatment option would be to pursue conservative treatment, including physical therapy, injections and oral medications. She has tried oral medications, she does not wish to try cortisone injections. The 3rd option would be to pursue surgical intervention including left shoulder video arthroscopy distal clavicle excision, subacromial decompression, rotator cuff and biceps work as needed  Risks and benefits of each option were discussed in great detail with the patient, at this time she would like to pursue further conservative treatment, she'll continue taking meloxicam.  Follow up on an as-needed basis if symptoms become worse. Patient agrees with this plan, all of their questions were answered best of our ability and to their satisfaction.         Mode Vela

## 2018-04-02 RX ORDER — SIMVASTATIN 20 MG
TABLET ORAL
Qty: 90 TABLET | Refills: 3 | Status: SHIPPED | OUTPATIENT
Start: 2018-04-02 | End: 2019-03-10 | Stop reason: SDUPTHER

## 2018-04-21 DIAGNOSIS — F33.2 SEVERE EPISODE OF RECURRENT MAJOR DEPRESSIVE DISORDER, WITHOUT PSYCHOTIC FEATURES (HCC): ICD-10-CM

## 2018-04-21 DIAGNOSIS — I10 ESSENTIAL HYPERTENSION: ICD-10-CM

## 2018-04-23 RX ORDER — ESCITALOPRAM OXALATE 10 MG/1
TABLET ORAL
Qty: 90 TABLET | Refills: 3 | Status: SHIPPED | OUTPATIENT
Start: 2018-04-23 | End: 2019-03-11 | Stop reason: SDUPTHER

## 2018-04-23 RX ORDER — LOSARTAN POTASSIUM AND HYDROCHLOROTHIAZIDE 25; 100 MG/1; MG/1
TABLET ORAL
Qty: 90 TABLET | Refills: 3 | Status: SHIPPED | OUTPATIENT
Start: 2018-04-23 | End: 2019-03-11 | Stop reason: SDUPTHER

## 2018-05-30 DIAGNOSIS — Z12.11 ENCOUNTER FOR SCREENING COLONOSCOPY: ICD-10-CM

## 2018-05-30 DIAGNOSIS — Z12.11 ENCOUNTER FOR SCREENING COLONOSCOPY: Primary | ICD-10-CM

## 2018-05-30 LAB
CONTROL: NORMAL
HEMOCCULT STL QL: NORMAL

## 2018-05-30 PROCEDURE — 82274 ASSAY TEST FOR BLOOD FECAL: CPT | Performed by: FAMILY MEDICINE

## 2018-08-17 ENCOUNTER — OFFICE VISIT (OUTPATIENT)
Dept: FAMILY MEDICINE CLINIC | Age: 68
End: 2018-08-17

## 2018-08-17 VITALS
OXYGEN SATURATION: 98 % | HEIGHT: 63 IN | HEART RATE: 76 BPM | BODY MASS INDEX: 32.6 KG/M2 | SYSTOLIC BLOOD PRESSURE: 136 MMHG | WEIGHT: 184 LBS | DIASTOLIC BLOOD PRESSURE: 88 MMHG

## 2018-08-17 DIAGNOSIS — I10 ESSENTIAL HYPERTENSION: Primary | ICD-10-CM

## 2018-08-17 DIAGNOSIS — Z23 NEED FOR PNEUMOCOCCAL VACCINATION: ICD-10-CM

## 2018-08-17 DIAGNOSIS — Z78.0 ASYMPTOMATIC MENOPAUSAL STATE: ICD-10-CM

## 2018-08-17 DIAGNOSIS — E78.2 MIXED HYPERLIPIDEMIA: ICD-10-CM

## 2018-08-17 DIAGNOSIS — E03.9 ACQUIRED HYPOTHYROIDISM: ICD-10-CM

## 2018-08-17 PROCEDURE — 3017F COLORECTAL CA SCREEN DOC REV: CPT | Performed by: FAMILY MEDICINE

## 2018-08-17 PROCEDURE — 1090F PRES/ABSN URINE INCON ASSESS: CPT | Performed by: FAMILY MEDICINE

## 2018-08-17 PROCEDURE — 4040F PNEUMOC VAC/ADMIN/RCVD: CPT | Performed by: FAMILY MEDICINE

## 2018-08-17 PROCEDURE — G8400 PT W/DXA NO RESULTS DOC: HCPCS | Performed by: FAMILY MEDICINE

## 2018-08-17 PROCEDURE — G8417 CALC BMI ABV UP PARAM F/U: HCPCS | Performed by: FAMILY MEDICINE

## 2018-08-17 PROCEDURE — G8428 CUR MEDS NOT DOCUMENT: HCPCS | Performed by: FAMILY MEDICINE

## 2018-08-17 PROCEDURE — G0009 ADMIN PNEUMOCOCCAL VACCINE: HCPCS | Performed by: FAMILY MEDICINE

## 2018-08-17 PROCEDURE — 36415 COLL VENOUS BLD VENIPUNCTURE: CPT | Performed by: FAMILY MEDICINE

## 2018-08-17 PROCEDURE — 99214 OFFICE O/P EST MOD 30 MIN: CPT | Performed by: FAMILY MEDICINE

## 2018-08-17 PROCEDURE — 90732 PPSV23 VACC 2 YRS+ SUBQ/IM: CPT | Performed by: FAMILY MEDICINE

## 2018-08-17 PROCEDURE — 1123F ACP DISCUSS/DSCN MKR DOCD: CPT | Performed by: FAMILY MEDICINE

## 2018-08-17 PROCEDURE — 1101F PT FALLS ASSESS-DOCD LE1/YR: CPT | Performed by: FAMILY MEDICINE

## 2018-08-17 PROCEDURE — 1036F TOBACCO NON-USER: CPT | Performed by: FAMILY MEDICINE

## 2018-08-17 ASSESSMENT — PATIENT HEALTH QUESTIONNAIRE - PHQ9
SUM OF ALL RESPONSES TO PHQ QUESTIONS 1-9: 0
SUM OF ALL RESPONSES TO PHQ QUESTIONS 1-9: 0

## 2018-08-17 ASSESSMENT — ANXIETY QUESTIONNAIRES: GAD7 TOTAL SCORE: 0

## 2018-08-17 ASSESSMENT — LIFESTYLE VARIABLES: HOW OFTEN DO YOU HAVE A DRINK CONTAINING ALCOHOL: 0

## 2018-08-17 NOTE — PROGRESS NOTES
Jakob Garcia is a 79 y.o. female    Chief Complaint   Patient presents with    Hypertension    Hyperlipidemia    Thyroid Problem       HPI:    Hypertension   This is a chronic problem. The current episode started more than 1 year ago. The problem is unchanged. The problem is controlled. Risk factors for coronary artery disease include post-menopausal state. Past treatments include angiotensin blockers and diuretics. The current treatment provides significant improvement. There are no compliance problems. Hyperlipidemia   This is a chronic problem. The current episode started more than 1 year ago. The problem is controlled. Recent lipid tests were reviewed and are normal. Exacerbating diseases include obesity. Pertinent negatives include no myalgias. Current antihyperlipidemic treatment includes statins. The current treatment provides significant improvement of lipids. There are no compliance problems. Risk factors for coronary artery disease include hypertension and post-menopausal.     Acquired hypothyroidism. This is a chronic condition. She takes her thyroid medicine in the morning on an empty stomach. Her TSH has not been checked in over 1 year. ROS:    Review of Systems   Cardiovascular: Negative for leg swelling. Musculoskeletal: Negative for myalgias. /88 (Site: Right Arm, Position: Sitting)   Pulse 76   Ht 5' 3\" (1.6 m)   Wt 184 lb (83.5 kg)   LMP  (LMP Unknown)   SpO2 98%   BMI 32.59 kg/m²     Physical Exam:    Physical Exam   Constitutional: She appears well-developed. No distress. Neck: No thyromegaly present. Cardiovascular: Normal rate and regular rhythm. No murmur heard. Pulmonary/Chest: Effort normal and breath sounds normal. No respiratory distress. She has no wheezes. Neurological: She is alert. Skin: She is not diaphoretic. Psychiatric: She has a normal mood and affect.        Current Outpatient Prescriptions   Medication Sig Dispense Refill    montelukast (SINGULAIR) 10 MG tablet TAKE 1 TABLET NIGHTLY 90 tablet 3    escitalopram (LEXAPRO) 10 MG tablet TAKE 1 TABLET DAILY 90 tablet 3    losartan-hydrochlorothiazide (HYZAAR) 100-25 MG per tablet TAKE 1 TABLET DAILY 90 tablet 3    simvastatin (ZOCOR) 20 MG tablet TAKE 1 TABLET NIGHTLY 90 tablet 3    meloxicam (MOBIC) 15 MG tablet Take 1 tablet by mouth daily 30 tablet 3    magnesium 30 MG tablet Take 30 mg by mouth 2 times daily      levothyroxine (SYNTHROID) 75 MCG tablet TAKE 1 TABLET DAILY 90 tablet 3    omeprazole (PRILOSEC) 20 MG delayed release capsule Take 20 mg by mouth daily      aspirin 81 MG tablet Take 81 mg by mouth daily      Misc Natural Products (FIBER 7 PO) Take by mouth      naproxen (NAPROSYN) 500 MG tablet Take 500 mg by mouth 2 times daily (with meals)       No current facility-administered medications for this visit. Assessment:    1. Essential hypertension    2. Acquired hypothyroidism    3. Mixed hyperlipidemia    4. Asymptomatic menopausal state    5. Need for pneumococcal vaccination        Plan:    1. Essential hypertension  Stable. Continue current medications. - Comprehensive Metabolic Panel  - CBC Auto Differential    2. Acquired hypothyroidism  Stable. Continue current medications.   - TSH with Reflex    3. Mixed hyperlipidemia  Stable. Continue current medications. 4. Asymptomatic menopausal state  - DEXA Bone Density Axial Skeleton; Future    5. Need for pneumococcal vaccination  - PNEUMOVAX 23 subcutaneous/IM (Pneumococcal polysaccharide vaccine 23-valent >= 3yo)      Return in about 6 months (around 2/17/2019) for medicare annual (HTN, hld, thyroid).

## 2018-08-18 LAB
A/G RATIO: 1.4 (ref 1.1–2.2)
ALBUMIN SERPL-MCNC: 4.2 G/DL (ref 3.4–5)
ALP BLD-CCNC: 118 U/L (ref 40–129)
ALT SERPL-CCNC: 19 U/L (ref 10–40)
ANION GAP SERPL CALCULATED.3IONS-SCNC: 12 MMOL/L (ref 3–16)
AST SERPL-CCNC: 19 U/L (ref 15–37)
BASOPHILS ABSOLUTE: 0.1 K/UL (ref 0–0.2)
BASOPHILS RELATIVE PERCENT: 0.8 %
BILIRUB SERPL-MCNC: 0.3 MG/DL (ref 0–1)
BUN BLDV-MCNC: 18 MG/DL (ref 7–20)
CALCIUM SERPL-MCNC: 9.6 MG/DL (ref 8.3–10.6)
CHLORIDE BLD-SCNC: 100 MMOL/L (ref 99–110)
CO2: 29 MMOL/L (ref 21–32)
CREAT SERPL-MCNC: 0.8 MG/DL (ref 0.6–1.2)
EOSINOPHILS ABSOLUTE: 0.5 K/UL (ref 0–0.6)
EOSINOPHILS RELATIVE PERCENT: 5.2 %
GFR AFRICAN AMERICAN: >60
GFR NON-AFRICAN AMERICAN: >60
GLOBULIN: 2.9 G/DL
GLUCOSE BLD-MCNC: 91 MG/DL (ref 70–99)
HCT VFR BLD CALC: 43.4 % (ref 36–48)
HEMOGLOBIN: 14.6 G/DL (ref 12–16)
LYMPHOCYTES ABSOLUTE: 3.8 K/UL (ref 1–5.1)
LYMPHOCYTES RELATIVE PERCENT: 40.8 %
MCH RBC QN AUTO: 32.9 PG (ref 26–34)
MCHC RBC AUTO-ENTMCNC: 33.7 G/DL (ref 31–36)
MCV RBC AUTO: 97.7 FL (ref 80–100)
MONOCYTES ABSOLUTE: 1 K/UL (ref 0–1.3)
MONOCYTES RELATIVE PERCENT: 10.3 %
NEUTROPHILS ABSOLUTE: 4 K/UL (ref 1.7–7.7)
NEUTROPHILS RELATIVE PERCENT: 42.9 %
PDW BLD-RTO: 13.1 % (ref 12.4–15.4)
PLATELET # BLD: 332 K/UL (ref 135–450)
PMV BLD AUTO: 8.6 FL (ref 5–10.5)
POTASSIUM SERPL-SCNC: 3.8 MMOL/L (ref 3.5–5.1)
RBC # BLD: 4.44 M/UL (ref 4–5.2)
SODIUM BLD-SCNC: 141 MMOL/L (ref 136–145)
TOTAL PROTEIN: 7.1 G/DL (ref 6.4–8.2)
TSH REFLEX: 1.28 UIU/ML (ref 0.27–4.2)
WBC # BLD: 9.3 K/UL (ref 4–11)

## 2018-09-04 DIAGNOSIS — M75.42 ROTATOR CUFF IMPINGEMENT SYNDROME OF LEFT SHOULDER: ICD-10-CM

## 2018-09-04 DIAGNOSIS — M19.019 ACROMIOCLAVICULAR JOINT ARTHRITIS: ICD-10-CM

## 2018-09-06 RX ORDER — MELOXICAM 15 MG/1
TABLET ORAL
Qty: 30 TABLET | Refills: 3 | Status: SHIPPED | OUTPATIENT
Start: 2018-09-06 | End: 2020-02-17 | Stop reason: ALTCHOICE

## 2019-01-08 ENCOUNTER — OFFICE VISIT (OUTPATIENT)
Dept: FAMILY MEDICINE CLINIC | Age: 69
End: 2019-01-08
Payer: MEDICARE

## 2019-01-08 VITALS
HEART RATE: 74 BPM | WEIGHT: 177 LBS | DIASTOLIC BLOOD PRESSURE: 84 MMHG | SYSTOLIC BLOOD PRESSURE: 126 MMHG | OXYGEN SATURATION: 97 % | BODY MASS INDEX: 31.35 KG/M2

## 2019-01-08 DIAGNOSIS — K13.0 CHAPPED LIPS: Primary | ICD-10-CM

## 2019-01-08 DIAGNOSIS — Z78.0 ASYMPTOMATIC MENOPAUSAL STATE: ICD-10-CM

## 2019-01-08 DIAGNOSIS — Z23 NEED FOR INFLUENZA VACCINATION: ICD-10-CM

## 2019-01-08 PROCEDURE — 1036F TOBACCO NON-USER: CPT | Performed by: FAMILY MEDICINE

## 2019-01-08 PROCEDURE — 1101F PT FALLS ASSESS-DOCD LE1/YR: CPT | Performed by: FAMILY MEDICINE

## 2019-01-08 PROCEDURE — 4040F PNEUMOC VAC/ADMIN/RCVD: CPT | Performed by: FAMILY MEDICINE

## 2019-01-08 PROCEDURE — 99213 OFFICE O/P EST LOW 20 MIN: CPT | Performed by: FAMILY MEDICINE

## 2019-01-08 PROCEDURE — 90662 IIV NO PRSV INCREASED AG IM: CPT | Performed by: FAMILY MEDICINE

## 2019-01-08 PROCEDURE — G8417 CALC BMI ABV UP PARAM F/U: HCPCS | Performed by: FAMILY MEDICINE

## 2019-01-08 PROCEDURE — G8427 DOCREV CUR MEDS BY ELIG CLIN: HCPCS | Performed by: FAMILY MEDICINE

## 2019-01-08 PROCEDURE — 1090F PRES/ABSN URINE INCON ASSESS: CPT | Performed by: FAMILY MEDICINE

## 2019-01-08 PROCEDURE — G0008 ADMIN INFLUENZA VIRUS VAC: HCPCS | Performed by: FAMILY MEDICINE

## 2019-01-08 PROCEDURE — 1123F ACP DISCUSS/DSCN MKR DOCD: CPT | Performed by: FAMILY MEDICINE

## 2019-01-08 PROCEDURE — G8400 PT W/DXA NO RESULTS DOC: HCPCS | Performed by: FAMILY MEDICINE

## 2019-01-08 PROCEDURE — 3017F COLORECTAL CA SCREEN DOC REV: CPT | Performed by: FAMILY MEDICINE

## 2019-01-08 PROCEDURE — G8482 FLU IMMUNIZE ORDER/ADMIN: HCPCS | Performed by: FAMILY MEDICINE

## 2019-01-11 ENCOUNTER — TELEPHONE (OUTPATIENT)
Dept: FAMILY MEDICINE CLINIC | Age: 69
End: 2019-01-11

## 2019-01-11 DIAGNOSIS — K13.0 CHAPPED LIPS: Primary | ICD-10-CM

## 2019-01-14 ENCOUNTER — TELEPHONE (OUTPATIENT)
Dept: FAMILY MEDICINE CLINIC | Age: 69
End: 2019-01-14

## 2019-01-14 DIAGNOSIS — K13.0 CHAPPED LIPS: Primary | ICD-10-CM

## 2019-01-16 RX ORDER — LEVOTHYROXINE SODIUM 0.07 MG/1
TABLET ORAL
Qty: 90 TABLET | Refills: 3 | Status: SHIPPED | OUTPATIENT
Start: 2019-01-16 | End: 2019-09-12 | Stop reason: SDUPTHER

## 2019-02-04 ENCOUNTER — TELEPHONE (OUTPATIENT)
Dept: FAMILY MEDICINE CLINIC | Age: 69
End: 2019-02-04

## 2019-02-04 DIAGNOSIS — K13.0 CHAPPED LIPS: Primary | ICD-10-CM

## 2019-03-01 ENCOUNTER — OFFICE VISIT (OUTPATIENT)
Dept: FAMILY MEDICINE CLINIC | Age: 69
End: 2019-03-01
Payer: MEDICARE

## 2019-03-01 VITALS
SYSTOLIC BLOOD PRESSURE: 114 MMHG | TEMPERATURE: 98.2 F | OXYGEN SATURATION: 95 % | BODY MASS INDEX: 31.64 KG/M2 | DIASTOLIC BLOOD PRESSURE: 74 MMHG | WEIGHT: 178.6 LBS | HEIGHT: 63 IN | HEART RATE: 76 BPM

## 2019-03-01 DIAGNOSIS — J40 BRONCHITIS: Primary | ICD-10-CM

## 2019-03-01 PROCEDURE — G8427 DOCREV CUR MEDS BY ELIG CLIN: HCPCS | Performed by: PHYSICIAN ASSISTANT

## 2019-03-01 PROCEDURE — 4040F PNEUMOC VAC/ADMIN/RCVD: CPT | Performed by: PHYSICIAN ASSISTANT

## 2019-03-01 PROCEDURE — 1101F PT FALLS ASSESS-DOCD LE1/YR: CPT | Performed by: PHYSICIAN ASSISTANT

## 2019-03-01 PROCEDURE — 3017F COLORECTAL CA SCREEN DOC REV: CPT | Performed by: PHYSICIAN ASSISTANT

## 2019-03-01 PROCEDURE — 1123F ACP DISCUSS/DSCN MKR DOCD: CPT | Performed by: PHYSICIAN ASSISTANT

## 2019-03-01 PROCEDURE — G8417 CALC BMI ABV UP PARAM F/U: HCPCS | Performed by: PHYSICIAN ASSISTANT

## 2019-03-01 PROCEDURE — 1036F TOBACCO NON-USER: CPT | Performed by: PHYSICIAN ASSISTANT

## 2019-03-01 PROCEDURE — G8482 FLU IMMUNIZE ORDER/ADMIN: HCPCS | Performed by: PHYSICIAN ASSISTANT

## 2019-03-01 PROCEDURE — 99213 OFFICE O/P EST LOW 20 MIN: CPT | Performed by: PHYSICIAN ASSISTANT

## 2019-03-01 PROCEDURE — 1090F PRES/ABSN URINE INCON ASSESS: CPT | Performed by: PHYSICIAN ASSISTANT

## 2019-03-01 PROCEDURE — G8400 PT W/DXA NO RESULTS DOC: HCPCS | Performed by: PHYSICIAN ASSISTANT

## 2019-03-01 RX ORDER — ALBUTEROL SULFATE 90 UG/1
2 AEROSOL, METERED RESPIRATORY (INHALATION) 4 TIMES DAILY PRN
Qty: 3 INHALER | Refills: 1 | Status: SHIPPED | OUTPATIENT
Start: 2019-03-01 | End: 2022-10-26

## 2019-03-01 RX ORDER — METHYLPREDNISOLONE 4 MG/1
TABLET ORAL
Qty: 1 KIT | Refills: 0 | Status: SHIPPED | OUTPATIENT
Start: 2019-03-01 | End: 2019-03-07

## 2019-03-01 ASSESSMENT — ENCOUNTER SYMPTOMS
RHINORRHEA: 0
ABDOMINAL PAIN: 0
SHORTNESS OF BREATH: 1
SORE THROAT: 0
CONSTIPATION: 0
COUGH: 1
VOMITING: 0
DIARRHEA: 0
NAUSEA: 0

## 2019-03-01 ASSESSMENT — PATIENT HEALTH QUESTIONNAIRE - PHQ9
1. LITTLE INTEREST OR PLEASURE IN DOING THINGS: 0
SUM OF ALL RESPONSES TO PHQ9 QUESTIONS 1 & 2: 0
SUM OF ALL RESPONSES TO PHQ QUESTIONS 1-9: 0
SUM OF ALL RESPONSES TO PHQ QUESTIONS 1-9: 0
2. FEELING DOWN, DEPRESSED OR HOPELESS: 0

## 2019-03-06 ENCOUNTER — HOSPITAL ENCOUNTER (OUTPATIENT)
Dept: WOMENS IMAGING | Age: 69
Discharge: HOME | End: 2019-03-06
Payer: MEDICARE

## 2019-03-06 DIAGNOSIS — Z78.0 ASYMPTOMATIC MENOPAUSAL STATE: ICD-10-CM

## 2019-03-06 PROCEDURE — 77080 DXA BONE DENSITY AXIAL: CPT

## 2019-03-11 ENCOUNTER — OFFICE VISIT (OUTPATIENT)
Dept: FAMILY MEDICINE CLINIC | Age: 69
End: 2019-03-11
Payer: MEDICARE

## 2019-03-11 VITALS
HEIGHT: 62 IN | HEART RATE: 70 BPM | BODY MASS INDEX: 32.76 KG/M2 | WEIGHT: 178 LBS | OXYGEN SATURATION: 97 % | SYSTOLIC BLOOD PRESSURE: 122 MMHG | DIASTOLIC BLOOD PRESSURE: 82 MMHG

## 2019-03-11 DIAGNOSIS — G25.0 BENIGN HEAD TREMOR: ICD-10-CM

## 2019-03-11 DIAGNOSIS — Z12.11 SCREENING FOR COLON CANCER: ICD-10-CM

## 2019-03-11 DIAGNOSIS — F33.2 SEVERE EPISODE OF RECURRENT MAJOR DEPRESSIVE DISORDER, WITHOUT PSYCHOTIC FEATURES (HCC): ICD-10-CM

## 2019-03-11 DIAGNOSIS — I10 ESSENTIAL HYPERTENSION: ICD-10-CM

## 2019-03-11 DIAGNOSIS — E03.9 ACQUIRED HYPOTHYROIDISM: ICD-10-CM

## 2019-03-11 DIAGNOSIS — E78.2 MIXED HYPERLIPIDEMIA: ICD-10-CM

## 2019-03-11 DIAGNOSIS — Z00.00 ROUTINE GENERAL MEDICAL EXAMINATION AT A HEALTH CARE FACILITY: Primary | ICD-10-CM

## 2019-03-11 PROCEDURE — 1090F PRES/ABSN URINE INCON ASSESS: CPT | Performed by: FAMILY MEDICINE

## 2019-03-11 PROCEDURE — 1036F TOBACCO NON-USER: CPT | Performed by: FAMILY MEDICINE

## 2019-03-11 PROCEDURE — 4040F PNEUMOC VAC/ADMIN/RCVD: CPT | Performed by: FAMILY MEDICINE

## 2019-03-11 PROCEDURE — 99214 OFFICE O/P EST MOD 30 MIN: CPT | Performed by: FAMILY MEDICINE

## 2019-03-11 PROCEDURE — G8427 DOCREV CUR MEDS BY ELIG CLIN: HCPCS | Performed by: FAMILY MEDICINE

## 2019-03-11 PROCEDURE — 3017F COLORECTAL CA SCREEN DOC REV: CPT | Performed by: FAMILY MEDICINE

## 2019-03-11 PROCEDURE — G8399 PT W/DXA RESULTS DOCUMENT: HCPCS | Performed by: FAMILY MEDICINE

## 2019-03-11 PROCEDURE — 1101F PT FALLS ASSESS-DOCD LE1/YR: CPT | Performed by: FAMILY MEDICINE

## 2019-03-11 PROCEDURE — G0439 PPPS, SUBSEQ VISIT: HCPCS | Performed by: FAMILY MEDICINE

## 2019-03-11 PROCEDURE — 1123F ACP DISCUSS/DSCN MKR DOCD: CPT | Performed by: FAMILY MEDICINE

## 2019-03-11 PROCEDURE — G8417 CALC BMI ABV UP PARAM F/U: HCPCS | Performed by: FAMILY MEDICINE

## 2019-03-11 PROCEDURE — G8482 FLU IMMUNIZE ORDER/ADMIN: HCPCS | Performed by: FAMILY MEDICINE

## 2019-03-11 RX ORDER — SIMVASTATIN 20 MG
TABLET ORAL
Qty: 90 TABLET | Refills: 3 | Status: SHIPPED | OUTPATIENT
Start: 2019-03-11 | End: 2019-03-11 | Stop reason: SDUPTHER

## 2019-03-11 RX ORDER — LOSARTAN POTASSIUM AND HYDROCHLOROTHIAZIDE 25; 100 MG/1; MG/1
TABLET ORAL
Qty: 90 TABLET | Refills: 3 | Status: SHIPPED | OUTPATIENT
Start: 2019-03-11 | End: 2019-03-11 | Stop reason: SDUPTHER

## 2019-03-11 RX ORDER — ESCITALOPRAM OXALATE 10 MG/1
TABLET ORAL
Qty: 90 TABLET | Refills: 3 | Status: SHIPPED | OUTPATIENT
Start: 2019-03-11 | End: 2019-12-12 | Stop reason: SDUPTHER

## 2019-03-11 RX ORDER — MONTELUKAST SODIUM 10 MG/1
TABLET ORAL
Qty: 90 TABLET | Refills: 3 | Status: SHIPPED | OUTPATIENT
Start: 2019-03-11 | End: 2019-03-11 | Stop reason: SDUPTHER

## 2019-03-11 RX ORDER — ESCITALOPRAM OXALATE 10 MG/1
TABLET ORAL
Qty: 90 TABLET | Refills: 3 | Status: SHIPPED | OUTPATIENT
Start: 2019-03-11 | End: 2019-03-11 | Stop reason: SDUPTHER

## 2019-03-11 RX ORDER — SIMVASTATIN 20 MG
TABLET ORAL
Qty: 90 TABLET | Refills: 3 | Status: SHIPPED | OUTPATIENT
Start: 2019-03-11 | End: 2019-12-12 | Stop reason: SDUPTHER

## 2019-03-11 RX ORDER — MONTELUKAST SODIUM 10 MG/1
TABLET ORAL
Qty: 90 TABLET | Refills: 3 | Status: SHIPPED | OUTPATIENT
Start: 2019-03-11 | End: 2019-12-12 | Stop reason: SDUPTHER

## 2019-03-11 RX ORDER — LOSARTAN POTASSIUM AND HYDROCHLOROTHIAZIDE 25; 100 MG/1; MG/1
TABLET ORAL
Qty: 90 TABLET | Refills: 3 | Status: SHIPPED | OUTPATIENT
Start: 2019-03-11 | End: 2019-12-12 | Stop reason: SDUPTHER

## 2019-03-11 ASSESSMENT — LIFESTYLE VARIABLES
HOW OFTEN DO YOU HAVE A DRINK CONTAINING ALCOHOL: 1
HOW MANY STANDARD DRINKS CONTAINING ALCOHOL DO YOU HAVE ON A TYPICAL DAY: 0
HOW OFTEN DURING THE LAST YEAR HAVE YOU FOUND THAT YOU WERE NOT ABLE TO STOP DRINKING ONCE YOU HAD STARTED: 0

## 2019-03-11 ASSESSMENT — ANXIETY QUESTIONNAIRES: GAD7 TOTAL SCORE: 0

## 2019-03-11 ASSESSMENT — PATIENT HEALTH QUESTIONNAIRE - PHQ9
SUM OF ALL RESPONSES TO PHQ QUESTIONS 1-9: 0
SUM OF ALL RESPONSES TO PHQ QUESTIONS 1-9: 0

## 2019-03-12 LAB
A/G RATIO: 1.3 (ref 1.1–2.2)
ALBUMIN SERPL-MCNC: 3.8 G/DL (ref 3.4–5)
ALP BLD-CCNC: 101 U/L (ref 40–129)
ALT SERPL-CCNC: 21 U/L (ref 10–40)
ANION GAP SERPL CALCULATED.3IONS-SCNC: 14 MMOL/L (ref 3–16)
AST SERPL-CCNC: 19 U/L (ref 15–37)
BILIRUB SERPL-MCNC: 0.4 MG/DL (ref 0–1)
BUN BLDV-MCNC: 15 MG/DL (ref 7–20)
CALCIUM SERPL-MCNC: 9.4 MG/DL (ref 8.3–10.6)
CHLORIDE BLD-SCNC: 101 MMOL/L (ref 99–110)
CO2: 27 MMOL/L (ref 21–32)
CREAT SERPL-MCNC: 0.8 MG/DL (ref 0.6–1.2)
GFR AFRICAN AMERICAN: >60
GFR NON-AFRICAN AMERICAN: >60
GLOBULIN: 2.9 G/DL
GLUCOSE BLD-MCNC: 93 MG/DL (ref 70–99)
MAGNESIUM: 2.1 MG/DL (ref 1.8–2.4)
POTASSIUM SERPL-SCNC: 4 MMOL/L (ref 3.5–5.1)
SODIUM BLD-SCNC: 142 MMOL/L (ref 136–145)
TOTAL PROTEIN: 6.7 G/DL (ref 6.4–8.2)

## 2019-03-15 DIAGNOSIS — Z12.11 SCREENING FOR COLON CANCER: ICD-10-CM

## 2019-03-15 LAB
CONTROL: NORMAL
HEMOCCULT STL QL: NORMAL

## 2019-03-15 PROCEDURE — 82274 ASSAY TEST FOR BLOOD FECAL: CPT | Performed by: FAMILY MEDICINE

## 2019-06-11 ENCOUNTER — OFFICE VISIT (OUTPATIENT)
Dept: ORTHOPEDIC SURGERY | Age: 69
End: 2019-06-11
Payer: MEDICARE

## 2019-06-11 VITALS — WEIGHT: 177.91 LBS | HEIGHT: 62 IN | BODY MASS INDEX: 32.74 KG/M2

## 2019-06-11 DIAGNOSIS — M79.672 FOOT PAIN, LEFT: Primary | ICD-10-CM

## 2019-06-11 DIAGNOSIS — M19.072 OSTEOARTHRITIS, LOCALIZED, ANKLE OR FOOT, LEFT: ICD-10-CM

## 2019-06-11 PROCEDURE — 99203 OFFICE O/P NEW LOW 30 MIN: CPT | Performed by: PODIATRIST

## 2019-06-11 PROCEDURE — 1123F ACP DISCUSS/DSCN MKR DOCD: CPT | Performed by: PODIATRIST

## 2019-06-11 PROCEDURE — G8399 PT W/DXA RESULTS DOCUMENT: HCPCS | Performed by: PODIATRIST

## 2019-06-11 PROCEDURE — 1036F TOBACCO NON-USER: CPT | Performed by: PODIATRIST

## 2019-06-11 PROCEDURE — G8427 DOCREV CUR MEDS BY ELIG CLIN: HCPCS | Performed by: PODIATRIST

## 2019-06-11 PROCEDURE — G8417 CALC BMI ABV UP PARAM F/U: HCPCS | Performed by: PODIATRIST

## 2019-06-11 PROCEDURE — L3260 AMBULATORY SURGICAL BOOT EAC: HCPCS | Performed by: PODIATRIST

## 2019-06-11 PROCEDURE — 4040F PNEUMOC VAC/ADMIN/RCVD: CPT | Performed by: PODIATRIST

## 2019-06-11 PROCEDURE — 3017F COLORECTAL CA SCREEN DOC REV: CPT | Performed by: PODIATRIST

## 2019-06-11 PROCEDURE — 1090F PRES/ABSN URINE INCON ASSESS: CPT | Performed by: PODIATRIST

## 2019-06-11 RX ORDER — PREDNISONE 10 MG/1
TABLET ORAL
Qty: 26 TABLET | Refills: 0 | Status: SHIPPED | OUTPATIENT
Start: 2019-06-11 | End: 2019-07-23 | Stop reason: ALTCHOICE

## 2019-07-23 ENCOUNTER — INITIAL CONSULT (OUTPATIENT)
Dept: NEUROLOGY | Age: 69
End: 2019-07-23
Payer: MEDICARE

## 2019-07-23 VITALS
OXYGEN SATURATION: 95 % | HEIGHT: 62 IN | DIASTOLIC BLOOD PRESSURE: 73 MMHG | WEIGHT: 181 LBS | BODY MASS INDEX: 33.31 KG/M2 | SYSTOLIC BLOOD PRESSURE: 129 MMHG | HEART RATE: 66 BPM

## 2019-07-23 DIAGNOSIS — G25.0 ESSENTIAL TREMOR: Primary | ICD-10-CM

## 2019-07-23 DIAGNOSIS — E03.9 ACQUIRED HYPOTHYROIDISM: ICD-10-CM

## 2019-07-23 DIAGNOSIS — I10 ESSENTIAL HYPERTENSION: ICD-10-CM

## 2019-07-23 PROCEDURE — 3017F COLORECTAL CA SCREEN DOC REV: CPT | Performed by: PSYCHIATRY & NEUROLOGY

## 2019-07-23 PROCEDURE — 99203 OFFICE O/P NEW LOW 30 MIN: CPT | Performed by: PSYCHIATRY & NEUROLOGY

## 2019-07-23 PROCEDURE — G8427 DOCREV CUR MEDS BY ELIG CLIN: HCPCS | Performed by: PSYCHIATRY & NEUROLOGY

## 2019-07-23 PROCEDURE — G8399 PT W/DXA RESULTS DOCUMENT: HCPCS | Performed by: PSYCHIATRY & NEUROLOGY

## 2019-07-23 PROCEDURE — G8417 CALC BMI ABV UP PARAM F/U: HCPCS | Performed by: PSYCHIATRY & NEUROLOGY

## 2019-07-23 PROCEDURE — 1123F ACP DISCUSS/DSCN MKR DOCD: CPT | Performed by: PSYCHIATRY & NEUROLOGY

## 2019-07-23 PROCEDURE — 1090F PRES/ABSN URINE INCON ASSESS: CPT | Performed by: PSYCHIATRY & NEUROLOGY

## 2019-07-23 PROCEDURE — 4040F PNEUMOC VAC/ADMIN/RCVD: CPT | Performed by: PSYCHIATRY & NEUROLOGY

## 2019-07-23 PROCEDURE — 1036F TOBACCO NON-USER: CPT | Performed by: PSYCHIATRY & NEUROLOGY

## 2019-07-23 NOTE — LETTER
on low-dose beta-blocker      Please do not hesitate to contact me, should you have any questions or concerns regarding the care of Tiesha Quiros     Sincerely,    Herminio Estrella MD    This dictation was generated by voice recognition computer software. Although all attempts are made to edit the dictation for accuracy, there may be errors in the transcription that are not intended.

## 2019-09-12 ENCOUNTER — OFFICE VISIT (OUTPATIENT)
Dept: FAMILY MEDICINE CLINIC | Age: 69
End: 2019-09-12
Payer: MEDICARE

## 2019-09-12 VITALS
TEMPERATURE: 98.1 F | BODY MASS INDEX: 31.54 KG/M2 | WEIGHT: 178 LBS | HEIGHT: 63 IN | DIASTOLIC BLOOD PRESSURE: 72 MMHG | OXYGEN SATURATION: 96 % | SYSTOLIC BLOOD PRESSURE: 126 MMHG | HEART RATE: 70 BPM

## 2019-09-12 DIAGNOSIS — I10 ESSENTIAL HYPERTENSION: Primary | ICD-10-CM

## 2019-09-12 DIAGNOSIS — E78.2 MIXED HYPERLIPIDEMIA: ICD-10-CM

## 2019-09-12 DIAGNOSIS — Z23 NEED FOR INFLUENZA VACCINATION: ICD-10-CM

## 2019-09-12 DIAGNOSIS — E03.9 ACQUIRED HYPOTHYROIDISM: ICD-10-CM

## 2019-09-12 LAB
A/G RATIO: 1.4 (ref 1.1–2.2)
ALBUMIN SERPL-MCNC: 4.3 G/DL (ref 3.4–5)
ALP BLD-CCNC: 120 U/L (ref 40–129)
ALT SERPL-CCNC: 19 U/L (ref 10–40)
ANION GAP SERPL CALCULATED.3IONS-SCNC: 15 MMOL/L (ref 3–16)
AST SERPL-CCNC: 19 U/L (ref 15–37)
BASOPHILS ABSOLUTE: 0.1 K/UL (ref 0–0.2)
BASOPHILS RELATIVE PERCENT: 1.3 %
BILIRUB SERPL-MCNC: 0.3 MG/DL (ref 0–1)
BUN BLDV-MCNC: 17 MG/DL (ref 7–20)
CALCIUM SERPL-MCNC: 10 MG/DL (ref 8.3–10.6)
CHLORIDE BLD-SCNC: 98 MMOL/L (ref 99–110)
CHOLESTEROL, TOTAL: 174 MG/DL (ref 0–199)
CO2: 28 MMOL/L (ref 21–32)
CREAT SERPL-MCNC: 0.7 MG/DL (ref 0.6–1.2)
EOSINOPHILS ABSOLUTE: 0.3 K/UL (ref 0–0.6)
EOSINOPHILS RELATIVE PERCENT: 3.3 %
GFR AFRICAN AMERICAN: >60
GFR NON-AFRICAN AMERICAN: >60
GLOBULIN: 3 G/DL
GLUCOSE BLD-MCNC: 105 MG/DL (ref 70–99)
HCT VFR BLD CALC: 42.9 % (ref 36–48)
HDLC SERPL-MCNC: 54 MG/DL (ref 40–60)
HEMOGLOBIN: 14.5 G/DL (ref 12–16)
LDL CHOLESTEROL CALCULATED: 75 MG/DL
LYMPHOCYTES ABSOLUTE: 3 K/UL (ref 1–5.1)
LYMPHOCYTES RELATIVE PERCENT: 36.9 %
MCH RBC QN AUTO: 33.3 PG (ref 26–34)
MCHC RBC AUTO-ENTMCNC: 33.9 G/DL (ref 31–36)
MCV RBC AUTO: 98.3 FL (ref 80–100)
MONOCYTES ABSOLUTE: 0.8 K/UL (ref 0–1.3)
MONOCYTES RELATIVE PERCENT: 9.2 %
NEUTROPHILS ABSOLUTE: 4.1 K/UL (ref 1.7–7.7)
NEUTROPHILS RELATIVE PERCENT: 49.3 %
PDW BLD-RTO: 13.4 % (ref 12.4–15.4)
PLATELET # BLD: 377 K/UL (ref 135–450)
PMV BLD AUTO: 8.2 FL (ref 5–10.5)
POTASSIUM SERPL-SCNC: 4.2 MMOL/L (ref 3.5–5.1)
RBC # BLD: 4.36 M/UL (ref 4–5.2)
SODIUM BLD-SCNC: 141 MMOL/L (ref 136–145)
TOTAL PROTEIN: 7.3 G/DL (ref 6.4–8.2)
TRIGL SERPL-MCNC: 223 MG/DL (ref 0–150)
TSH REFLEX: 2.39 UIU/ML (ref 0.27–4.2)
VLDLC SERPL CALC-MCNC: 45 MG/DL
WBC # BLD: 8.2 K/UL (ref 4–11)

## 2019-09-12 PROCEDURE — 99214 OFFICE O/P EST MOD 30 MIN: CPT | Performed by: FAMILY MEDICINE

## 2019-09-12 PROCEDURE — 1036F TOBACCO NON-USER: CPT | Performed by: FAMILY MEDICINE

## 2019-09-12 PROCEDURE — G8417 CALC BMI ABV UP PARAM F/U: HCPCS | Performed by: FAMILY MEDICINE

## 2019-09-12 PROCEDURE — 3017F COLORECTAL CA SCREEN DOC REV: CPT | Performed by: FAMILY MEDICINE

## 2019-09-12 PROCEDURE — G8399 PT W/DXA RESULTS DOCUMENT: HCPCS | Performed by: FAMILY MEDICINE

## 2019-09-12 PROCEDURE — 1123F ACP DISCUSS/DSCN MKR DOCD: CPT | Performed by: FAMILY MEDICINE

## 2019-09-12 PROCEDURE — 4040F PNEUMOC VAC/ADMIN/RCVD: CPT | Performed by: FAMILY MEDICINE

## 2019-09-12 PROCEDURE — G0008 ADMIN INFLUENZA VIRUS VAC: HCPCS | Performed by: FAMILY MEDICINE

## 2019-09-12 PROCEDURE — 1090F PRES/ABSN URINE INCON ASSESS: CPT | Performed by: FAMILY MEDICINE

## 2019-09-12 PROCEDURE — G8427 DOCREV CUR MEDS BY ELIG CLIN: HCPCS | Performed by: FAMILY MEDICINE

## 2019-09-12 PROCEDURE — 90653 IIV ADJUVANT VACCINE IM: CPT | Performed by: FAMILY MEDICINE

## 2019-09-12 RX ORDER — LEVOTHYROXINE SODIUM 0.07 MG/1
TABLET ORAL
Qty: 90 TABLET | Refills: 3 | Status: SHIPPED | OUTPATIENT
Start: 2019-09-12 | End: 2020-09-14

## 2019-12-12 ENCOUNTER — OFFICE VISIT (OUTPATIENT)
Dept: FAMILY MEDICINE CLINIC | Age: 69
End: 2019-12-12
Payer: MEDICARE

## 2019-12-12 VITALS
OXYGEN SATURATION: 97 % | HEART RATE: 70 BPM | SYSTOLIC BLOOD PRESSURE: 134 MMHG | DIASTOLIC BLOOD PRESSURE: 78 MMHG | TEMPERATURE: 98.6 F

## 2019-12-12 DIAGNOSIS — J40 BRONCHITIS: ICD-10-CM

## 2019-12-12 DIAGNOSIS — F33.2 SEVERE EPISODE OF RECURRENT MAJOR DEPRESSIVE DISORDER, WITHOUT PSYCHOTIC FEATURES (HCC): ICD-10-CM

## 2019-12-12 DIAGNOSIS — E78.2 MIXED HYPERLIPIDEMIA: ICD-10-CM

## 2019-12-12 DIAGNOSIS — E03.9 ACQUIRED HYPOTHYROIDISM: ICD-10-CM

## 2019-12-12 DIAGNOSIS — I10 ESSENTIAL HYPERTENSION: Primary | ICD-10-CM

## 2019-12-12 PROCEDURE — G8399 PT W/DXA RESULTS DOCUMENT: HCPCS | Performed by: FAMILY MEDICINE

## 2019-12-12 PROCEDURE — 1036F TOBACCO NON-USER: CPT | Performed by: FAMILY MEDICINE

## 2019-12-12 PROCEDURE — 3017F COLORECTAL CA SCREEN DOC REV: CPT | Performed by: FAMILY MEDICINE

## 2019-12-12 PROCEDURE — 1123F ACP DISCUSS/DSCN MKR DOCD: CPT | Performed by: FAMILY MEDICINE

## 2019-12-12 PROCEDURE — 99214 OFFICE O/P EST MOD 30 MIN: CPT | Performed by: FAMILY MEDICINE

## 2019-12-12 PROCEDURE — 4040F PNEUMOC VAC/ADMIN/RCVD: CPT | Performed by: FAMILY MEDICINE

## 2019-12-12 PROCEDURE — G8427 DOCREV CUR MEDS BY ELIG CLIN: HCPCS | Performed by: FAMILY MEDICINE

## 2019-12-12 PROCEDURE — 1090F PRES/ABSN URINE INCON ASSESS: CPT | Performed by: FAMILY MEDICINE

## 2019-12-12 PROCEDURE — G8482 FLU IMMUNIZE ORDER/ADMIN: HCPCS | Performed by: FAMILY MEDICINE

## 2019-12-12 PROCEDURE — G8417 CALC BMI ABV UP PARAM F/U: HCPCS | Performed by: FAMILY MEDICINE

## 2019-12-12 RX ORDER — LOSARTAN POTASSIUM AND HYDROCHLOROTHIAZIDE 25; 100 MG/1; MG/1
TABLET ORAL
Qty: 90 TABLET | Refills: 3 | Status: SHIPPED | OUTPATIENT
Start: 2019-12-12

## 2019-12-12 RX ORDER — AZITHROMYCIN 250 MG/1
TABLET, FILM COATED ORAL
Qty: 1 PACKET | Refills: 0 | Status: SHIPPED | OUTPATIENT
Start: 2019-12-12 | End: 2020-02-11 | Stop reason: ALTCHOICE

## 2019-12-12 RX ORDER — BENZONATATE 200 MG/1
200 CAPSULE ORAL 3 TIMES DAILY PRN
Qty: 30 CAPSULE | Refills: 1 | Status: SHIPPED | OUTPATIENT
Start: 2019-12-12 | End: 2020-02-11 | Stop reason: ALTCHOICE

## 2019-12-12 RX ORDER — MONTELUKAST SODIUM 10 MG/1
TABLET ORAL
Qty: 90 TABLET | Refills: 3 | Status: SHIPPED | OUTPATIENT
Start: 2019-12-12 | End: 2021-01-14

## 2019-12-12 RX ORDER — SIMVASTATIN 20 MG
TABLET ORAL
Qty: 90 TABLET | Refills: 3 | Status: SHIPPED | OUTPATIENT
Start: 2019-12-12 | End: 2020-12-15

## 2019-12-12 RX ORDER — ALBUTEROL SULFATE 90 UG/1
2 AEROSOL, METERED RESPIRATORY (INHALATION) EVERY 6 HOURS PRN
Qty: 1 INHALER | Refills: 3 | Status: SHIPPED | OUTPATIENT
Start: 2019-12-12 | End: 2020-02-17 | Stop reason: ALTCHOICE

## 2019-12-12 RX ORDER — ESCITALOPRAM OXALATE 10 MG/1
TABLET ORAL
Qty: 90 TABLET | Refills: 3 | Status: SHIPPED | OUTPATIENT
Start: 2019-12-12

## 2019-12-12 ASSESSMENT — ENCOUNTER SYMPTOMS: COUGH: 1

## 2019-12-20 ENCOUNTER — TELEPHONE (OUTPATIENT)
Dept: FAMILY MEDICINE CLINIC | Age: 69
End: 2019-12-20

## 2019-12-20 RX ORDER — PREDNISONE 20 MG/1
20 TABLET ORAL 2 TIMES DAILY
Qty: 10 TABLET | Refills: 0 | Status: SHIPPED | OUTPATIENT
Start: 2019-12-20 | End: 2019-12-25

## 2019-12-20 RX ORDER — DEXTROMETHORPHAN HYDROBROMIDE AND PROMETHAZINE HYDROCHLORIDE 15; 6.25 MG/5ML; MG/5ML
5 SYRUP ORAL 4 TIMES DAILY PRN
Qty: 118 ML | Refills: 0 | Status: ON HOLD
Start: 2019-12-20 | End: 2020-03-08 | Stop reason: HOSPADM

## 2020-02-06 ENCOUNTER — OFFICE VISIT (OUTPATIENT)
Dept: ORTHOPEDIC SURGERY | Age: 70
End: 2020-02-06
Payer: MEDICARE

## 2020-02-06 VITALS — WEIGHT: 177.91 LBS | BODY MASS INDEX: 31.52 KG/M2 | HEIGHT: 63 IN

## 2020-02-06 PROCEDURE — 3017F COLORECTAL CA SCREEN DOC REV: CPT | Performed by: ORTHOPAEDIC SURGERY

## 2020-02-06 PROCEDURE — 4040F PNEUMOC VAC/ADMIN/RCVD: CPT | Performed by: ORTHOPAEDIC SURGERY

## 2020-02-06 PROCEDURE — G8399 PT W/DXA RESULTS DOCUMENT: HCPCS | Performed by: ORTHOPAEDIC SURGERY

## 2020-02-06 PROCEDURE — 99214 OFFICE O/P EST MOD 30 MIN: CPT | Performed by: ORTHOPAEDIC SURGERY

## 2020-02-06 PROCEDURE — 1036F TOBACCO NON-USER: CPT | Performed by: ORTHOPAEDIC SURGERY

## 2020-02-06 PROCEDURE — G8482 FLU IMMUNIZE ORDER/ADMIN: HCPCS | Performed by: ORTHOPAEDIC SURGERY

## 2020-02-06 PROCEDURE — 1090F PRES/ABSN URINE INCON ASSESS: CPT | Performed by: ORTHOPAEDIC SURGERY

## 2020-02-06 PROCEDURE — G8417 CALC BMI ABV UP PARAM F/U: HCPCS | Performed by: ORTHOPAEDIC SURGERY

## 2020-02-06 PROCEDURE — G8427 DOCREV CUR MEDS BY ELIG CLIN: HCPCS | Performed by: ORTHOPAEDIC SURGERY

## 2020-02-06 PROCEDURE — 1123F ACP DISCUSS/DSCN MKR DOCD: CPT | Performed by: ORTHOPAEDIC SURGERY

## 2020-02-06 NOTE — PROGRESS NOTES
infection, bleeding, nerve injury resulting in motor or sensory loss, DVT, RSD, persistent pain, loss of motion, functional instability, and need for further surgery. At no time were any guarantees implied or stated. The patient acknowledged these risks and electively reviewed and signed the consent form. Surgery will be scheduled for a mutually convenient time. Patient will obtain medical clearance from their PCP prior to surgery.          Lucy Rausch

## 2020-02-10 ENCOUNTER — TELEPHONE (OUTPATIENT)
Dept: ORTHOPEDIC SURGERY | Age: 70
End: 2020-02-10

## 2020-02-11 ENCOUNTER — OFFICE VISIT (OUTPATIENT)
Dept: FAMILY MEDICINE CLINIC | Age: 70
End: 2020-02-11
Payer: MEDICARE

## 2020-02-11 ENCOUNTER — HOSPITAL ENCOUNTER (OUTPATIENT)
Dept: WOMENS IMAGING | Age: 70
Discharge: HOME | End: 2020-02-11
Payer: MEDICARE

## 2020-02-11 VITALS — SYSTOLIC BLOOD PRESSURE: 134 MMHG | HEART RATE: 78 BPM | DIASTOLIC BLOOD PRESSURE: 86 MMHG | OXYGEN SATURATION: 98 %

## 2020-02-11 LAB
A/G RATIO: 1.4 (ref 1.1–2.2)
ALBUMIN SERPL-MCNC: 4.2 G/DL (ref 3.4–5)
ALP BLD-CCNC: 115 U/L (ref 40–129)
ALT SERPL-CCNC: 21 U/L (ref 10–40)
ANION GAP SERPL CALCULATED.3IONS-SCNC: 13 MMOL/L (ref 3–16)
AST SERPL-CCNC: 18 U/L (ref 15–37)
BASOPHILS ABSOLUTE: 0.2 K/UL (ref 0–0.2)
BASOPHILS RELATIVE PERCENT: 1.8 %
BILIRUB SERPL-MCNC: 0.3 MG/DL (ref 0–1)
BUN BLDV-MCNC: 19 MG/DL (ref 7–20)
CALCIUM SERPL-MCNC: 9.9 MG/DL (ref 8.3–10.6)
CHLORIDE BLD-SCNC: 102 MMOL/L (ref 99–110)
CO2: 29 MMOL/L (ref 21–32)
CREAT SERPL-MCNC: 0.6 MG/DL (ref 0.6–1.2)
EOSINOPHILS ABSOLUTE: 0.4 K/UL (ref 0–0.6)
EOSINOPHILS RELATIVE PERCENT: 5.3 %
GFR AFRICAN AMERICAN: >60
GFR NON-AFRICAN AMERICAN: >60
GLOBULIN: 2.9 G/DL
GLUCOSE BLD-MCNC: 104 MG/DL (ref 70–99)
HCT VFR BLD CALC: 42.9 % (ref 36–48)
HEMOGLOBIN: 14.6 G/DL (ref 12–16)
LYMPHOCYTES ABSOLUTE: 3.1 K/UL (ref 1–5.1)
LYMPHOCYTES RELATIVE PERCENT: 37.4 %
MCH RBC QN AUTO: 33.4 PG (ref 26–34)
MCHC RBC AUTO-ENTMCNC: 34.1 G/DL (ref 31–36)
MCV RBC AUTO: 97.9 FL (ref 80–100)
MONOCYTES ABSOLUTE: 0.7 K/UL (ref 0–1.3)
MONOCYTES RELATIVE PERCENT: 8.9 %
NEUTROPHILS ABSOLUTE: 3.9 K/UL (ref 1.7–7.7)
NEUTROPHILS RELATIVE PERCENT: 46.6 %
PDW BLD-RTO: 13.6 % (ref 12.4–15.4)
PLATELET # BLD: 320 K/UL (ref 135–450)
PMV BLD AUTO: 8.3 FL (ref 5–10.5)
POTASSIUM SERPL-SCNC: 4.3 MMOL/L (ref 3.5–5.1)
RBC # BLD: 4.38 M/UL (ref 4–5.2)
SODIUM BLD-SCNC: 144 MMOL/L (ref 136–145)
TOTAL PROTEIN: 7.1 G/DL (ref 6.4–8.2)
WBC # BLD: 8.3 K/UL (ref 4–11)

## 2020-02-11 PROCEDURE — 99214 OFFICE O/P EST MOD 30 MIN: CPT | Performed by: FAMILY MEDICINE

## 2020-02-11 PROCEDURE — 1123F ACP DISCUSS/DSCN MKR DOCD: CPT | Performed by: FAMILY MEDICINE

## 2020-02-11 PROCEDURE — 1090F PRES/ABSN URINE INCON ASSESS: CPT | Performed by: FAMILY MEDICINE

## 2020-02-11 PROCEDURE — 3017F COLORECTAL CA SCREEN DOC REV: CPT | Performed by: FAMILY MEDICINE

## 2020-02-11 PROCEDURE — G8482 FLU IMMUNIZE ORDER/ADMIN: HCPCS | Performed by: FAMILY MEDICINE

## 2020-02-11 PROCEDURE — G8417 CALC BMI ABV UP PARAM F/U: HCPCS | Performed by: FAMILY MEDICINE

## 2020-02-11 PROCEDURE — 1036F TOBACCO NON-USER: CPT | Performed by: FAMILY MEDICINE

## 2020-02-11 PROCEDURE — G8427 DOCREV CUR MEDS BY ELIG CLIN: HCPCS | Performed by: FAMILY MEDICINE

## 2020-02-11 PROCEDURE — 4040F PNEUMOC VAC/ADMIN/RCVD: CPT | Performed by: FAMILY MEDICINE

## 2020-02-11 PROCEDURE — G8399 PT W/DXA RESULTS DOCUMENT: HCPCS | Performed by: FAMILY MEDICINE

## 2020-02-11 PROCEDURE — 77067 SCR MAMMO BI INCL CAD: CPT

## 2020-02-11 PROCEDURE — 93000 ELECTROCARDIOGRAM COMPLETE: CPT | Performed by: FAMILY MEDICINE

## 2020-02-11 ASSESSMENT — PATIENT HEALTH QUESTIONNAIRE - PHQ9
2. FEELING DOWN, DEPRESSED OR HOPELESS: 0
SUM OF ALL RESPONSES TO PHQ9 QUESTIONS 1 & 2: 0
SUM OF ALL RESPONSES TO PHQ QUESTIONS 1-9: 0
SUM OF ALL RESPONSES TO PHQ QUESTIONS 1-9: 0
1. LITTLE INTEREST OR PLEASURE IN DOING THINGS: 0

## 2020-02-11 NOTE — PROGRESS NOTES
Preoperative Consultation      Rosio Seo  YOB: 1950    Date of Service:  2/11/2020    Vitals:    02/11/20 0917   BP: 134/86   Pulse: 78   SpO2: 98%      Wt Readings from Last 2 Encounters:   02/06/20 177 lb 14.6 oz (80.7 kg)   09/12/19 178 lb (80.7 kg)     BP Readings from Last 3 Encounters:   02/11/20 134/86   12/12/19 134/78   09/12/19 126/72        Chief Complaint   Patient presents with   Gurdeep Apple Pre-op Exam     Allergies   Allergen Reactions    Codeine Other (See Comments)     Skin crawls     Outpatient Medications Marked as Taking for the 2/11/20 encounter (Office Visit) with Philip Esteves, DO   Medication Sig Dispense Refill    promethazine-dextromethorphan (PROMETHAZINE-DM) 6.25-15 MG/5ML syrup Take 5 mLs by mouth 4 times daily as needed for Cough 118 mL 0    simvastatin (ZOCOR) 20 MG tablet TAKE 1 TABLET NIGHTLY 90 tablet 3    losartan-hydrochlorothiazide (HYZAAR) 100-25 MG per tablet TAKE 1 TABLET DAILY 90 tablet 3    montelukast (SINGULAIR) 10 MG tablet TAKE 1 TABLET NIGHTLY 90 tablet 3    escitalopram (LEXAPRO) 10 MG tablet TAKE 1 TABLET DAILY 90 tablet 3    albuterol sulfate  (90 Base) MCG/ACT inhaler Inhale 2 puffs into the lungs every 6 hours as needed for Shortness of Breath 1 Inhaler 3    levothyroxine (SYNTHROID) 75 MCG tablet TAKE 1 TABLET DAILY 90 tablet 3    albuterol sulfate  (90 Base) MCG/ACT inhaler Inhale 2 puffs into the lungs 4 times daily as needed for Wheezing 3 Inhaler 1    Multiple Vitamins-Minerals (WOMENS 50+ MULTI VITAMIN/MIN) TABS Take by mouth daily      meloxicam (MOBIC) 15 MG tablet TAKE 1 TABLET DAILY 30 tablet 3    magnesium 30 MG tablet Take 30 mg by mouth 2 times daily      omeprazole (PRILOSEC) 20 MG delayed release capsule Take 20 mg by mouth daily      aspirin 81 MG tablet Take 81 mg by mouth daily      Misc Natural Products (FIBER 7 PO) Take by mouth         This patient presents to the office today for a preoperative consultation at the request of surgeon, Dr. Ilene Perez, who plans on performing left shoulder surgery on  at [facility].  The current problem began 2 years ago, and symptoms have been worsening with time. Conservative therapy: Yes: NSAID's, which has been not very effective. .    Planned anesthesia: General   Known anesthesia problems: None   Bleeding risk: No recent or remote history of abnormal bleeding  Personal or FH of DVT/PE: No    Patient objection to receiving blood products: No    Patient Active Problem List   Diagnosis    Sarcoid    Dyspnea    HTN (hypertension)    GERD (gastroesophageal reflux disease)    Acute pain of left shoulder    Rotator cuff impingement syndrome of left shoulder    Neoplasia    Acromioclavicular joint arthritis    Acquired hypothyroidism    Essential tremor       Past Medical History:   Diagnosis Date    Acromioclavicular joint arthritis 3/15/2018    Chronic back pain     Depression     w/Anxiety    HTN (hypertension)     Hypothyroid     Sarcoidosis      Past Surgical History:   Procedure Laterality Date    BRONCHOSCOPY       SECTION      LUNG BIOPSY       Family History   Problem Relation Age of Onset    Hypertension Mother     Diabetes Father     Cancer Son         testicular    Asthma Neg Hx     Emphysema Neg Hx     Heart Failure Neg Hx      Social History     Socioeconomic History    Marital status:       Spouse name: Not on file    Number of children: Not on file    Years of education: Not on file    Highest education level: Not on file   Occupational History    Not on file   Social Needs    Financial resource strain: Not on file    Food insecurity:     Worry: Not on file     Inability: Not on file    Transportation needs:     Medical: Not on file     Non-medical: Not on file   Tobacco Use    Smoking status: Never Smoker    Smokeless tobacco: Never Used   Substance and Sexual Activity    Alcohol use: No    Drug use: No    Sexual activity: Not on file   Lifestyle    Physical activity:     Days per week: Not on file     Minutes per session: Not on file    Stress: Not on file   Relationships    Social connections:     Talks on phone: Not on file     Gets together: Not on file     Attends [employer] service: Not on file     Active member of club or organization: Not on file     Attends meetings of clubs or organizations: Not on file     Relationship status: Not on file    Intimate partner violence:     Fear of current or ex partner: Not on file     Emotionally abused: Not on file     Physically abused: Not on file     Forced sexual activity: Not on file   Other Topics Concern    Not on file   Social History Narrative    Not on file       Review of Systems  A comprehensive review of systems was negative except for what was noted in the HPI. Physical Exam   Constitutional: She is oriented to person, place, and time. She appears well-developed and well-nourished. No distress. HENT:   Head: Normocephalic and atraumatic. Mouth/Throat: Uvula is midline, oropharynx is clear and moist and mucous membranes are normal.   Eyes: Conjunctivae and EOM are normal. Pupils are equal, round, and reactive to light. Neck: Trachea normal and normal range of motion. Neck supple. No JVD present. No mass and no thyromegaly present. Cardiovascular: Normal rate, regular rhythm, normal heart sounds and intact distal pulses. Exam reveals no gallop and no friction rub. No murmur heard. Pulmonary/Chest: Effort normal and breath sounds normal. No respiratory distress. She has no wheezes. She has no rales. Abdominal: Soft. Normal aorta and bowel sounds are normal. She exhibits no distension and no mass. There is no hepatosplenomegaly. No tenderness. Musculoskeletal: She exhibits no edema and no tenderness. Neurological: She is alert and oriented to person, place, and time. She has normal strength.  No cranial nerve deficit or sensory deficit. Coordination and gait normal.   Skin: Skin is warm and dry. No rash noted. No erythema. Psychiatric: She has a normal mood and affect. Her behavior is normal.     EKG Interpretation:  normal EKG, normal sinus rhythm, unchanged from previous tracings. Lab Review   Lab Results   Component Value Date     02/11/2020    K 4.3 02/11/2020     02/11/2020    CO2 29 02/11/2020    BUN 19 02/11/2020    CREATININE 0.6 02/11/2020    GLUCOSE 104 02/11/2020    CALCIUM 9.9 02/11/2020     Lab Results   Component Value Date    WBC 8.3 02/11/2020    HGB 14.6 02/11/2020    HCT 42.9 02/11/2020    MCV 97.9 02/11/2020     02/11/2020           Assessment:       71 y.o. patient with planned surgery as above. Known risk factors for perioperative complications: Hypertension  Current medications which may produce withdrawal symptoms if withheld perioperatively: none   Chronic left shoulder pain     Plan:     1. Preoperative workup as follows: hemoglobin, hematocrit, electrolytes, creatinine  2. Change in medication regimen before surgery: Discontinue NSAIDs (Meloxicam) 7 days before surgery  3. Prophylaxis for cardiac events with perioperative beta-blockers: Not indicated  ACC/AHA indications for pre-operative beta-blocker use:    · Vascular surgery with history of postitive stress test  · Intermediate or high risk surgery with history of CAD   · Intermediate or high risk surgery with multiple clinical predictors of CAD- 2 of the following: history of compensated or prior heart failure, history of cerebrovascular disease, DM, or renal insufficiency    Routine administration of higher-dose, long-acting metoprolol in beta-blocker-naïve patients on the day of surgery, and in the absence of dose titration is associated with an overall increase in mortality.   Beta-blockers should be started days to weeks prior to surgery and titrated to pulse < 70.  4. Deep vein thrombosis prophylaxis: regimen to be chosen by surgical team  5.  No contraindications to planned surgery

## 2020-02-19 ENCOUNTER — ANESTHESIA EVENT (OUTPATIENT)
Dept: OPERATING ROOM | Age: 70
End: 2020-02-19
Payer: MEDICARE

## 2020-02-19 ENCOUNTER — HOSPITAL ENCOUNTER (OUTPATIENT)
Age: 70
Setting detail: OUTPATIENT SURGERY
Discharge: HOME | End: 2020-02-19
Attending: ORTHOPAEDIC SURGERY | Admitting: ORTHOPAEDIC SURGERY
Payer: MEDICARE

## 2020-02-19 ENCOUNTER — ANESTHESIA (OUTPATIENT)
Dept: OPERATING ROOM | Age: 70
End: 2020-02-19
Payer: MEDICARE

## 2020-02-19 VITALS
DIASTOLIC BLOOD PRESSURE: 57 MMHG | RESPIRATION RATE: 16 BRPM | HEIGHT: 63 IN | SYSTOLIC BLOOD PRESSURE: 103 MMHG | TEMPERATURE: 97.8 F | OXYGEN SATURATION: 92 % | BODY MASS INDEX: 31.36 KG/M2 | HEART RATE: 77 BPM | WEIGHT: 177 LBS

## 2020-02-19 VITALS
RESPIRATION RATE: 13 BRPM | SYSTOLIC BLOOD PRESSURE: 156 MMHG | DIASTOLIC BLOOD PRESSURE: 90 MMHG | OXYGEN SATURATION: 99 % | TEMPERATURE: 98.2 F

## 2020-02-19 PROCEDURE — 7100000001 HC PACU RECOVERY - ADDTL 15 MIN: Performed by: ORTHOPAEDIC SURGERY

## 2020-02-19 PROCEDURE — 2580000003 HC RX 258: Performed by: ORTHOPAEDIC SURGERY

## 2020-02-19 PROCEDURE — 3700000000 HC ANESTHESIA ATTENDED CARE: Performed by: ORTHOPAEDIC SURGERY

## 2020-02-19 PROCEDURE — 64415 NJX AA&/STRD BRCH PLXS IMG: CPT | Performed by: ANESTHESIOLOGY

## 2020-02-19 PROCEDURE — C1713 ANCHOR/SCREW BN/BN,TIS/BN: HCPCS | Performed by: ORTHOPAEDIC SURGERY

## 2020-02-19 PROCEDURE — 2500000003 HC RX 250 WO HCPCS: Performed by: NURSE ANESTHETIST, CERTIFIED REGISTERED

## 2020-02-19 PROCEDURE — 2720000010 HC SURG SUPPLY STERILE: Performed by: ORTHOPAEDIC SURGERY

## 2020-02-19 PROCEDURE — 7100000011 HC PHASE II RECOVERY - ADDTL 15 MIN: Performed by: ORTHOPAEDIC SURGERY

## 2020-02-19 PROCEDURE — 2580000003 HC RX 258: Performed by: ANESTHESIOLOGY

## 2020-02-19 PROCEDURE — 3600000004 HC SURGERY LEVEL 4 BASE: Performed by: ORTHOPAEDIC SURGERY

## 2020-02-19 PROCEDURE — 7100000000 HC PACU RECOVERY - FIRST 15 MIN: Performed by: ORTHOPAEDIC SURGERY

## 2020-02-19 PROCEDURE — 6360000002 HC RX W HCPCS: Performed by: ORTHOPAEDIC SURGERY

## 2020-02-19 PROCEDURE — 6360000002 HC RX W HCPCS: Performed by: NURSE ANESTHETIST, CERTIFIED REGISTERED

## 2020-02-19 PROCEDURE — 2500000003 HC RX 250 WO HCPCS: Performed by: ANESTHESIOLOGY

## 2020-02-19 PROCEDURE — 2709999900 HC NON-CHARGEABLE SUPPLY: Performed by: ORTHOPAEDIC SURGERY

## 2020-02-19 PROCEDURE — 3600000014 HC SURGERY LEVEL 4 ADDTL 15MIN: Performed by: ORTHOPAEDIC SURGERY

## 2020-02-19 PROCEDURE — 2780000010 HC IMPLANT OTHER: Performed by: ORTHOPAEDIC SURGERY

## 2020-02-19 PROCEDURE — 3700000001 HC ADD 15 MINUTES (ANESTHESIA): Performed by: ORTHOPAEDIC SURGERY

## 2020-02-19 PROCEDURE — 6360000002 HC RX W HCPCS: Performed by: ANESTHESIOLOGY

## 2020-02-19 PROCEDURE — 7100000010 HC PHASE II RECOVERY - FIRST 15 MIN: Performed by: ORTHOPAEDIC SURGERY

## 2020-02-19 DEVICE — Z INACTIVE USE 2600835 ANCHOR TEND 8 FOR REGENETEN BIOINDUCTIVE IMPL SYS: Type: IMPLANTABLE DEVICE | Site: SHOULDER | Status: FUNCTIONAL

## 2020-02-19 DEVICE — IMPLANTABLE DEVICE
Type: IMPLANTABLE DEVICE | Site: SHOULDER | Status: FUNCTIONAL
Brand: BIOINDUCTIVE IMPLANT WITH ARTHROSCOPIC DELIVERY SYSTEM - LARGE

## 2020-02-19 DEVICE — BONE ANCHORS 3 WITH ARTHROSCOPIC DELIVERY SYSTEM ADVANCED
Type: IMPLANTABLE DEVICE | Site: SHOULDER | Status: FUNCTIONAL
Brand: BONE ANCHORS WITH ARTHROSCOPIC DELIVERY SYSTEM - ADVANCED

## 2020-02-19 RX ORDER — IBUPROFEN 600 MG/1
600 TABLET ORAL EVERY 8 HOURS PRN
Qty: 30 TABLET | Refills: 0 | Status: ON HOLD | OUTPATIENT
Start: 2020-02-19 | End: 2022-10-29 | Stop reason: HOSPADM

## 2020-02-19 RX ORDER — DIPHENHYDRAMINE HYDROCHLORIDE 50 MG/ML
12.5 INJECTION INTRAMUSCULAR; INTRAVENOUS
Status: DISCONTINUED | OUTPATIENT
Start: 2020-02-19 | End: 2020-02-19 | Stop reason: HOSPADM

## 2020-02-19 RX ORDER — OXYCODONE HYDROCHLORIDE AND ACETAMINOPHEN 5; 325 MG/1; MG/1
1 TABLET ORAL PRN
Status: DISCONTINUED | OUTPATIENT
Start: 2020-02-19 | End: 2020-02-19 | Stop reason: HOSPADM

## 2020-02-19 RX ORDER — DEXAMETHASONE SODIUM PHOSPHATE 4 MG/ML
INJECTION, SOLUTION INTRA-ARTICULAR; INTRALESIONAL; INTRAMUSCULAR; INTRAVENOUS; SOFT TISSUE PRN
Status: DISCONTINUED | OUTPATIENT
Start: 2020-02-19 | End: 2020-02-19 | Stop reason: SDUPTHER

## 2020-02-19 RX ORDER — PROMETHAZINE HYDROCHLORIDE 25 MG/ML
6.25 INJECTION, SOLUTION INTRAMUSCULAR; INTRAVENOUS
Status: DISCONTINUED | OUTPATIENT
Start: 2020-02-19 | End: 2020-02-19 | Stop reason: HOSPADM

## 2020-02-19 RX ORDER — SODIUM CHLORIDE 0.9 % (FLUSH) 0.9 %
10 SYRINGE (ML) INJECTION PRN
Status: DISCONTINUED | OUTPATIENT
Start: 2020-02-19 | End: 2020-02-19 | Stop reason: HOSPADM

## 2020-02-19 RX ORDER — ONDANSETRON 2 MG/ML
INJECTION INTRAMUSCULAR; INTRAVENOUS PRN
Status: DISCONTINUED | OUTPATIENT
Start: 2020-02-19 | End: 2020-02-19 | Stop reason: SDUPTHER

## 2020-02-19 RX ORDER — MORPHINE SULFATE 10 MG/ML
2 INJECTION, SOLUTION INTRAMUSCULAR; INTRAVENOUS EVERY 5 MIN PRN
Status: DISCONTINUED | OUTPATIENT
Start: 2020-02-19 | End: 2020-02-19 | Stop reason: HOSPADM

## 2020-02-19 RX ORDER — MIDAZOLAM HYDROCHLORIDE 1 MG/ML
INJECTION INTRAMUSCULAR; INTRAVENOUS
Status: COMPLETED
Start: 2020-02-19 | End: 2020-02-19

## 2020-02-19 RX ORDER — ACETAMINOPHEN 10 MG/ML
1000 INJECTION, SOLUTION INTRAVENOUS ONCE
Status: COMPLETED | OUTPATIENT
Start: 2020-02-19 | End: 2020-02-19

## 2020-02-19 RX ORDER — PROPOFOL 10 MG/ML
INJECTION, EMULSION INTRAVENOUS PRN
Status: DISCONTINUED | OUTPATIENT
Start: 2020-02-19 | End: 2020-02-19 | Stop reason: SDUPTHER

## 2020-02-19 RX ORDER — MAGNESIUM HYDROXIDE 1200 MG/15ML
LIQUID ORAL CONTINUOUS PRN
Status: COMPLETED | OUTPATIENT
Start: 2020-02-19 | End: 2020-02-19

## 2020-02-19 RX ORDER — OXYCODONE HYDROCHLORIDE AND ACETAMINOPHEN 5; 325 MG/1; MG/1
1 TABLET ORAL EVERY 6 HOURS PRN
Qty: 28 TABLET | Refills: 0 | Status: SHIPPED | OUTPATIENT
Start: 2020-02-19 | End: 2020-02-26

## 2020-02-19 RX ORDER — ONDANSETRON 2 MG/ML
4 INJECTION INTRAMUSCULAR; INTRAVENOUS
Status: DISCONTINUED | OUTPATIENT
Start: 2020-02-19 | End: 2020-02-19 | Stop reason: HOSPADM

## 2020-02-19 RX ORDER — SODIUM CHLORIDE 0.9 % (FLUSH) 0.9 %
10 SYRINGE (ML) INJECTION EVERY 12 HOURS SCHEDULED
Status: DISCONTINUED | OUTPATIENT
Start: 2020-02-19 | End: 2020-02-19 | Stop reason: HOSPADM

## 2020-02-19 RX ORDER — LIDOCAINE HYDROCHLORIDE 20 MG/ML
INJECTION, SOLUTION INFILTRATION; PERINEURAL PRN
Status: DISCONTINUED | OUTPATIENT
Start: 2020-02-19 | End: 2020-02-19 | Stop reason: SDUPTHER

## 2020-02-19 RX ORDER — ROCURONIUM BROMIDE 10 MG/ML
INJECTION, SOLUTION INTRAVENOUS PRN
Status: DISCONTINUED | OUTPATIENT
Start: 2020-02-19 | End: 2020-02-19 | Stop reason: SDUPTHER

## 2020-02-19 RX ORDER — LIDOCAINE HYDROCHLORIDE 10 MG/ML
0.3 INJECTION, SOLUTION EPIDURAL; INFILTRATION; INTRACAUDAL; PERINEURAL
Status: COMPLETED | OUTPATIENT
Start: 2020-02-19 | End: 2020-02-19

## 2020-02-19 RX ORDER — MIDAZOLAM HYDROCHLORIDE 1 MG/ML
INJECTION INTRAMUSCULAR; INTRAVENOUS PRN
Status: DISCONTINUED | OUTPATIENT
Start: 2020-02-19 | End: 2020-02-19 | Stop reason: SDUPTHER

## 2020-02-19 RX ORDER — LABETALOL HYDROCHLORIDE 5 MG/ML
5 INJECTION, SOLUTION INTRAVENOUS EVERY 10 MIN PRN
Status: DISCONTINUED | OUTPATIENT
Start: 2020-02-19 | End: 2020-02-19 | Stop reason: HOSPADM

## 2020-02-19 RX ORDER — MORPHINE SULFATE 10 MG/ML
1 INJECTION, SOLUTION INTRAMUSCULAR; INTRAVENOUS EVERY 5 MIN PRN
Status: DISCONTINUED | OUTPATIENT
Start: 2020-02-19 | End: 2020-02-19 | Stop reason: HOSPADM

## 2020-02-19 RX ORDER — SODIUM CHLORIDE, SODIUM LACTATE, POTASSIUM CHLORIDE, CALCIUM CHLORIDE 600; 310; 30; 20 MG/100ML; MG/100ML; MG/100ML; MG/100ML
INJECTION, SOLUTION INTRAVENOUS CONTINUOUS
Status: DISCONTINUED | OUTPATIENT
Start: 2020-02-19 | End: 2020-02-19 | Stop reason: HOSPADM

## 2020-02-19 RX ORDER — HYDRALAZINE HYDROCHLORIDE 20 MG/ML
5 INJECTION INTRAMUSCULAR; INTRAVENOUS EVERY 10 MIN PRN
Status: DISCONTINUED | OUTPATIENT
Start: 2020-02-19 | End: 2020-02-19 | Stop reason: HOSPADM

## 2020-02-19 RX ORDER — BUPIVACAINE HYDROCHLORIDE 5 MG/ML
INJECTION, SOLUTION EPIDURAL; INTRACAUDAL PRN
Status: DISCONTINUED | OUTPATIENT
Start: 2020-02-19 | End: 2020-02-19 | Stop reason: SDUPTHER

## 2020-02-19 RX ORDER — OXYCODONE HYDROCHLORIDE AND ACETAMINOPHEN 5; 325 MG/1; MG/1
2 TABLET ORAL PRN
Status: DISCONTINUED | OUTPATIENT
Start: 2020-02-19 | End: 2020-02-19 | Stop reason: HOSPADM

## 2020-02-19 RX ADMIN — Medication 1.5 G: at 10:17

## 2020-02-19 RX ADMIN — ROCURONIUM BROMIDE 10 MG: 10 INJECTION, SOLUTION INTRAVENOUS at 10:45

## 2020-02-19 RX ADMIN — DEXAMETHASONE SODIUM PHOSPHATE 8 MG: 4 INJECTION, SOLUTION INTRAMUSCULAR; INTRAVENOUS at 10:37

## 2020-02-19 RX ADMIN — PHENYLEPHRINE HYDROCHLORIDE 100 MCG: 10 INJECTION INTRAVENOUS at 10:29

## 2020-02-19 RX ADMIN — LIDOCAINE HYDROCHLORIDE 0.3 ML: 10 INJECTION, SOLUTION EPIDURAL; INFILTRATION; INTRACAUDAL; PERINEURAL at 07:37

## 2020-02-19 RX ADMIN — BUPIVACAINE HYDROCHLORIDE 30 ML: 5 INJECTION, SOLUTION EPIDURAL; INTRACAUDAL; PERINEURAL at 08:58

## 2020-02-19 RX ADMIN — MIDAZOLAM 4 MG: 1 INJECTION INTRAMUSCULAR; INTRAVENOUS at 08:58

## 2020-02-19 RX ADMIN — SODIUM CHLORIDE, POTASSIUM CHLORIDE, SODIUM LACTATE AND CALCIUM CHLORIDE: 600; 310; 30; 20 INJECTION, SOLUTION INTRAVENOUS at 11:40

## 2020-02-19 RX ADMIN — LIDOCAINE HYDROCHLORIDE 80 MG: 20 INJECTION, SOLUTION INFILTRATION; PERINEURAL at 10:25

## 2020-02-19 RX ADMIN — PHENYLEPHRINE HYDROCHLORIDE 100 MCG: 10 INJECTION INTRAVENOUS at 10:41

## 2020-02-19 RX ADMIN — SODIUM CHLORIDE, POTASSIUM CHLORIDE, SODIUM LACTATE AND CALCIUM CHLORIDE: 600; 310; 30; 20 INJECTION, SOLUTION INTRAVENOUS at 07:37

## 2020-02-19 RX ADMIN — SUGAMMADEX 100 MG: 100 INJECTION, SOLUTION INTRAVENOUS at 11:46

## 2020-02-19 RX ADMIN — ROCURONIUM BROMIDE 50 MG: 10 INJECTION, SOLUTION INTRAVENOUS at 10:25

## 2020-02-19 RX ADMIN — ONDANSETRON 4 MG: 2 INJECTION, SOLUTION INTRAMUSCULAR; INTRAVENOUS at 10:37

## 2020-02-19 RX ADMIN — ACETAMINOPHEN 1000 MG: 10 INJECTION, SOLUTION INTRAVENOUS at 07:37

## 2020-02-19 RX ADMIN — PROPOFOL 200 MG: 10 INJECTION, EMULSION INTRAVENOUS at 10:25

## 2020-02-19 RX ADMIN — PROPOFOL 50 MG: 10 INJECTION, EMULSION INTRAVENOUS at 11:46

## 2020-02-19 RX ADMIN — Medication 1.5 G: at 10:03

## 2020-02-19 RX ADMIN — PHENYLEPHRINE HYDROCHLORIDE 100 MCG: 10 INJECTION INTRAVENOUS at 10:35

## 2020-02-19 ASSESSMENT — PULMONARY FUNCTION TESTS
PIF_VALUE: 1
PIF_VALUE: 23
PIF_VALUE: 23
PIF_VALUE: 22
PIF_VALUE: 22
PIF_VALUE: 21
PIF_VALUE: 3
PIF_VALUE: 0
PIF_VALUE: 22
PIF_VALUE: 24
PIF_VALUE: 22
PIF_VALUE: 22
PIF_VALUE: 17
PIF_VALUE: 2
PIF_VALUE: 1
PIF_VALUE: 2
PIF_VALUE: 22
PIF_VALUE: 25
PIF_VALUE: 22
PIF_VALUE: 17
PIF_VALUE: 18
PIF_VALUE: 25
PIF_VALUE: 22
PIF_VALUE: 24
PIF_VALUE: 21
PIF_VALUE: 26
PIF_VALUE: 25
PIF_VALUE: 25
PIF_VALUE: 23
PIF_VALUE: 18
PIF_VALUE: 22
PIF_VALUE: 1
PIF_VALUE: 25
PIF_VALUE: 11
PIF_VALUE: 24
PIF_VALUE: 26
PIF_VALUE: 23
PIF_VALUE: 26
PIF_VALUE: 26
PIF_VALUE: 25
PIF_VALUE: 16
PIF_VALUE: 21
PIF_VALUE: 25
PIF_VALUE: 16
PIF_VALUE: 1
PIF_VALUE: 22
PIF_VALUE: 2
PIF_VALUE: 25
PIF_VALUE: 21
PIF_VALUE: 26
PIF_VALUE: 1
PIF_VALUE: 22
PIF_VALUE: 25
PIF_VALUE: 21
PIF_VALUE: 3
PIF_VALUE: 21
PIF_VALUE: 21
PIF_VALUE: 22
PIF_VALUE: 23
PIF_VALUE: 26
PIF_VALUE: 25
PIF_VALUE: 11
PIF_VALUE: 22
PIF_VALUE: 20
PIF_VALUE: 25
PIF_VALUE: 25
PIF_VALUE: 23
PIF_VALUE: 22
PIF_VALUE: 26
PIF_VALUE: 22
PIF_VALUE: 26
PIF_VALUE: 25
PIF_VALUE: 22
PIF_VALUE: 26
PIF_VALUE: 26
PIF_VALUE: 22
PIF_VALUE: 26
PIF_VALUE: 22
PIF_VALUE: 0
PIF_VALUE: 1
PIF_VALUE: 17
PIF_VALUE: 22
PIF_VALUE: 22
PIF_VALUE: 23
PIF_VALUE: 36
PIF_VALUE: 22
PIF_VALUE: 25
PIF_VALUE: 25
PIF_VALUE: 24
PIF_VALUE: 25
PIF_VALUE: 1
PIF_VALUE: 26
PIF_VALUE: 25
PIF_VALUE: 23
PIF_VALUE: 21

## 2020-02-19 ASSESSMENT — ENCOUNTER SYMPTOMS: SHORTNESS OF BREATH: 1

## 2020-02-19 ASSESSMENT — PAIN - FUNCTIONAL ASSESSMENT: PAIN_FUNCTIONAL_ASSESSMENT: 0-10

## 2020-02-19 ASSESSMENT — PAIN DESCRIPTION - DESCRIPTORS: DESCRIPTORS: CONSTANT

## 2020-02-19 NOTE — ANESTHESIA PRE PROCEDURE
Department of Anesthesiology  Preprocedure Note       Name:  Sheldon Lara   Age:  71 y.o.  :  1950                                          MRN:  [id number]         Date:  2020      Surgeon: Monica Ma):  Klaudia Araujo DO    Procedure: LEFT SHOULDER VIDEO ARTHROSCOPY, DISTAL CLAVICLE EXCISION, SUBACROMIAL DECOMPRESSION EVALUATION OF ROTATOR CUFF POSSIBLE REPAIR, POSSIBLE ROTATION MEDICAL GRAFT (Left )    Medications prior to admission:   Prior to Admission medications    Medication Sig Start Date End Date Taking? Authorizing Provider   oxyCODONE-acetaminophen (PERCOCET) 5-325 MG per tablet Take 1 tablet by mouth every 6 hours as needed for Pain for up to 7 days.  20 Yes Claritza Ferguson DO   ibuprofen (ADVIL;MOTRIN) 600 MG tablet Take 1 tablet by mouth every 8 hours as needed for Pain 20 Yes Claritza Ferguson, DO   promethazine-dextromethorphan (PROMETHAZINE-DM) 6.25-15 MG/5ML syrup Take 5 mLs by mouth 4 times daily as needed for Cough 19  Yes Safia Vieyra DO   simvastatin (ZOCOR) 20 MG tablet TAKE 1 TABLET NIGHTLY 19  Yes Safia Vieyra DO   losartan-hydrochlorothiazide (HYZAAR) 100-25 MG per tablet TAKE 1 TABLET DAILY 19  Yes Safia Vieyra DO   montelukast (SINGULAIR) 10 MG tablet TAKE 1 TABLET NIGHTLY 19  Yes Safia Vieyra DO   escitalopram (LEXAPRO) 10 MG tablet TAKE 1 TABLET DAILY 19  Yes Safia Vieyra DO   levothyroxine (SYNTHROID) 75 MCG tablet TAKE 1 TABLET DAILY 19  Yes Safia Vieyra DO   Multiple Vitamins-Minerals (WOMENS 50+ MULTI VITAMIN/MIN) TABS Take by mouth daily   Yes Historical Provider, MD   magnesium 30 MG tablet Take 30 mg by mouth 2 times daily   Yes Historical Provider, MD   omeprazole (PRILOSEC) 20 MG delayed release capsule Take 20 mg by mouth daily   Yes Historical Provider, MD   aspirin 81 MG tablet Take 81 mg by mouth daily   Yes Historical Provider, MD   Misc Natural Products (FIBER 7 PO) Take by mouth   Yes Historical Provider, MD   albuterol sulfate  (90 Base) MCG/ACT inhaler Inhale 2 puffs into the lungs 4 times daily as needed for Wheezing 3/1/19   MILAD Roa       Current medications:    Current Facility-Administered Medications   Medication Dose Route Frequency Provider Last Rate Last Dose    vancomycin 1.5 g in dextrose 5% 300 mL IVPB  1,500 mg Intravenous Once Alice Ferguson DO        lactated ringers infusion   Intravenous Continuous Priscilla Shane  mL/hr at 20 0737      sodium chloride flush 0.9 % injection 10 mL  10 mL Intravenous 2 times per day Priscilla Shane MD        sodium chloride flush 0.9 % injection 10 mL  10 mL Intravenous PRN Priscilla Shane MD           Allergies: Allergies   Allergen Reactions    Codeine Other (See Comments)     Skin crawls       Problem List:    Patient Active Problem List   Diagnosis Code    Sarcoid D86.9    Dyspnea R06.00    HTN (hypertension) I10    GERD (gastroesophageal reflux disease) K21.9    Acute pain of left shoulder M25.512    Rotator cuff impingement syndrome of left shoulder M75.42    Neoplasia D49.9    Acromioclavicular joint arthritis M19.019    Acquired hypothyroidism E03.9    Essential tremor G25.0       Past Medical History:        Diagnosis Date    Acromioclavicular joint arthritis 3/15/2018    Chronic back pain     Depression     w/Anxiety    HTN (hypertension)     Hypothyroid     Sarcoidosis        Past Surgical History:        Procedure Laterality Date    BRONCHOSCOPY       SECTION      LUNG BIOPSY         Social History:    Social History     Tobacco Use    Smoking status: Never Smoker    Smokeless tobacco: Never Used   Substance Use Topics    Alcohol use:  No                                Counseling given: Not Answered      Vital Signs (Current):   Vitals:    20 1628 20 0724   BP:  118/65   Pulse:  64   Resp:  16   Temp:  98.6 °F (37 °C)   TempSrc:

## 2020-02-19 NOTE — OP NOTE
Diagnostic Shoulder Arthroscopy with Subacromial Decompression and Distal Clavicle Resection      Annelise Peters (1950)    2/19/2020      Preoperative Diagnosis-  1. Outlet Impingement left shoulder                                            2. AC joint arthritis left shoulder           3. Partial thickness rotator cuff tear left shoulder     Postoperative Diagnosis- 1. Outlet Impingement left shoulder                                             2. AC joint arthritis left shoulder           3. Partial thickness rotator cuff tear left shoulder    Procedure-   1. Diagnostic arthroscopy left shoulder             2.  Subacromial decompression (CPT- 41804)    3.  AC joint resection left shoulder ([id number])     4. Rotator cuff repair with RMG only left shoulder( 45801, [id number] RMG)                     Surgeon-  Lucy Rausch    Assistant(s)-  Braden Stark    Anesthesia- Scalene and General    EBL: Minimal    Indications for Operation  Persistent should pain with subjective and objective findings consistent with impingement and AC joint symptoms. The patient chose to proceed with the aforementioned procedures. At no time were any guarantees implied or stated. Site Marking and Surgical Prep  The patient was seen in the holding area and the appropriate extremity marked with a pen. Interscalene block was administered by the anesthesia department. The patient was taken to the operative suite, identified, placed on the operating room table in the supine position. After induction of general anesthesia, the patient was placed in the beach chair position with all bony prominences adequately padded and the head and neck anatomically supported. Examination  Under Anesthesia. Full symmetric range of motion, no instability      Diagnostic Arthroscopy  The upper extremity from the neck to fingertips was prepped with Hibiclens and alcohol and draped in the standard fashion.   The forearm was well padded and secured

## 2020-02-19 NOTE — ANESTHESIA POSTPROCEDURE EVALUATION
Department of Anesthesiology  Postprocedure Note    Patient: Byron Blackman  MRN: [id number]  YOB: 1950  Date of evaluation: 2/19/2020  Time:  3:00 PM     Procedure Summary     Date:  02/19/20 Room / Location:  [address] [place] [address] / [facility]    Anesthesia Start:  6272 Anesthesia Stop:  8898    Procedure:  LEFT SHOULDER VIDEO ARTHROSCOPY, DISTAL CLAVICLE EXCISION, SUBACROMIAL DECOMPRESSION, ROTATOR CUFF REPAIR WITH ROTATION MEDICAL GRAFT (Left ) Diagnosis:  (ROTATOR CUFF IMPINGMENT LEFT SHOULDER)    Surgeon:  Cristel Day DO Responsible Provider:  Argenis Valdez MD    Anesthesia Type:  general ASA Status:  3          Anesthesia Type: general    Lee Phase I: Lee Score: 9    Lee Phase II: Lee Score: 10    Last vitals: Reviewed and per EMR flowsheets. Anesthesia Post Evaluation    Patient location during evaluation: PACU  Patient participation: complete - patient participated  Level of consciousness: awake and alert  Airway patency: patent  Nausea & Vomiting: no nausea and no vomiting  Complications: no  Cardiovascular status: blood pressure returned to baseline  Respiratory status: acceptable  Hydration status: euvolemic  Comments: VSS on transfer to phase 2 recovery. No anesthetic complications.

## 2020-02-19 NOTE — PROGRESS NOTES
Family notified and given update
REPORT FROM OR AND ANESTHESIA
To car via w/c per RN and patients family.
surgery. 14. If you have a Living Will and Durable Power of  for Healthcare, please bring in a copy. 13. Notify your Surgeon if you develop any illness between now and surgery  time, cough, cold, fever, sore throat, nausea, vomiting, etc.  Please notify your surgeon if you experience dizziness, shortness of breath or blurred vision between now & the time of your surgery   16. DO NOT shave your operative site 96 hours prior to surgery. For face & neck surgery, men may use an electric razor 48 hours prior to surgery. 17. Shower with _x__Antibacterial soap (x_chlorhexidine for total joint  Pt's) shower two times before surgery.(the morning of and the night before. 18. To provide excellent care visitors will be limited to one in the room at any given time.   Please call pre admission testing if you any further questions [phone] or [phone]

## 2020-02-19 NOTE — ANESTHESIA PROCEDURE NOTES
Peripheral Block    Patient location during procedure: pre-op  Staffing  Anesthesiologist: Brittany Thompson MD  Performed: anesthesiologist   Preanesthetic Checklist  Completed: patient identified, site marked, surgical consent, pre-op evaluation, timeout performed, IV checked, risks and benefits discussed, monitors and equipment checked, anesthesia consent given, oxygen available and patient being monitored  Peripheral Block  Patient position: supine  Prep: ChloraPrep  Patient monitoring: continuous pulse ox and IV access  Block type: Brachial plexus  Laterality: left  Injection technique: single-shot  Procedures: nerve stimulator  Infiltration strength: 1 %  Dose: 3 mL  Interscalene  Provider prep: sterile gloves  Needle  Needle type: combined needle/nerve stimulator   Needle gauge: 21 G  Needle length: 10 cm  Needle localization: nerve stimulator  Assessment  Injection assessment: negative aspiration for heme and no paresthesia on injection  Paresthesia pain: none  Slow fractionated injection: yes  Hemodynamics: stable  Additional Notes  Immediately prior to procedure a \"time out\" was called to verify the correct patient, allergies, laterality, procedure and equipment. Time out performed with  RN. Stimuplex used to identify left shoulder stim 1.2 to 0.4 mA  Local Anesthetic: 0.5 %  Bupivacaine plus epi 1 to 200K  Amount: 30 ml  in 5 ml increments after negative aspiration each time.       Reason for block: post-op pain management and at surgeon's request

## 2020-02-27 ENCOUNTER — OFFICE VISIT (OUTPATIENT)
Dept: ORTHOPEDIC SURGERY | Age: 70
End: 2020-02-27

## 2020-02-27 ENCOUNTER — HOSPITAL ENCOUNTER (OUTPATIENT)
Dept: PHYSICAL THERAPY | Age: 70
Setting detail: THERAPIES SERIES
Discharge: HOME | End: 2020-02-27
Payer: MEDICARE

## 2020-02-27 VITALS — HEIGHT: 61 IN | WEIGHT: 177.03 LBS | BODY MASS INDEX: 33.42 KG/M2

## 2020-02-27 PROBLEM — Z98.890 STATUS POST ARTHROSCOPY OF LEFT SHOULDER: Status: ACTIVE | Noted: 2020-02-27

## 2020-02-27 PROCEDURE — 97110 THERAPEUTIC EXERCISES: CPT | Performed by: PHYSICAL THERAPIST

## 2020-02-27 PROCEDURE — 99024 POSTOP FOLLOW-UP VISIT: CPT | Performed by: ORTHOPAEDIC SURGERY

## 2020-02-27 PROCEDURE — 97161 PT EVAL LOW COMPLEX 20 MIN: CPT | Performed by: PHYSICAL THERAPIST

## 2020-02-27 PROCEDURE — 97140 MANUAL THERAPY 1/> REGIONS: CPT | Performed by: PHYSICAL THERAPIST

## 2020-02-27 NOTE — PLAN OF CARE
[]Radiating [x]Localized []other:     Numbness/Tingling: none    Occupation/School: Retired    Living Status/Prior Level of Function: Independent with ADLs and IADLs, fishing, bingo. OBJECTIVE:     CERV ROM     Cervical Flexion     Cervical Extension     Cervical SB     Cervical rotation          ROM Left Right   Shoulder Flex 75 WFL   Shoulder Abd     Shoulder ER Neutral    Shoulder IR                    Strength  Left Right   Shoulder Flex NT 5   Shoulder Scap  5   Shoulder ER  5   Shoulder IR  5               Reflexes/Sensation:    [x]Dermatomes/Myotomes intact    [x]Reflexes equal and normal bilaterally   []Other:    Joint mobility: NT   []Normal    []Hypo   []Hyper    Palpation: NT    Functional Mobility/Transfers: I with transfers    Posture: wearing post op sling    Bandages/Dressings/Incisions: dressing change performed by MD prior to PT appointment. No drainage noted. Gait: (include devices/WB status): WNL    Orthopedic Special Tests: NA                       [x] Patient history, allergies, meds reviewed. Medical chart reviewed. See intake form. Review Of Systems (ROS):  [x]Performed Review of systems (Integumentary, CardioPulmonary, Neurological) by intake and observation. Intake form has been scanned into medical record. Patient has been instructed to contact their primary care physician regarding ROS issues if not already being addressed at this time.       Co-morbidities/Complexities (which will affect course of rehabilitation):   []None           Arthritic conditions   []Rheumatoid arthritis (M05.9)  []Osteoarthritis (M19.91)   Cardiovascular conditions   [x]Hypertension (I10)  []Hyperlipidemia (E78.5)  []Angina pectoris (I20)  []Atherosclerosis (I70)   Musculoskeletal conditions   []Disc pathology   []Congenital spine pathologies   []Prior surgical intervention  []Osteoporosis (M81.8)  [x]Osteopenia (M85.8)   Endocrine conditions   []Hypothyroid (E03.9)  []Hyperthyroid Gastrointestinal conditions   []Constipation (G31.15)   Metabolic conditions   []Morbid obesity (E66.01)  []Diabetes type 1(E10.65) or 2 (E11.65)   []Neuropathy (G60.9)     Pulmonary conditions   []Asthma (J45)  []Coughing   []COPD (J44.9)   Psychological Disorders  []Anxiety (F41.9)  []Depression (F32.9)   []Other:   []Other:          Barriers to/and or personal factors that will affect rehab potential:              []Age  []Sex              []Motivation/Lack of Motivation                        [x]Co-Morbidities              []Cognitive Function, education/learning barriers              []Environmental, home barriers              []profession/work barriers  [x]past PT/medical experience  []other:  Justification:      Falls Risk Assessment (30 days):   [x] Falls Risk assessed and no intervention required.   [] Falls Risk assessed and Patient requires intervention due to being higher risk   TUG score (>12s at risk):     [] Falls education provided, including       G-Codes:       ASSESSMENT:   Functional Impairments   []Noted spinal or UE joint hypomobility   []Noted spinal or UE joint hypermobility   [x]Decreased UE functional ROM   [x]Decreased UE functional strength   []Abnormal reflexes/sensation/myotomal/dermatomal deficits   [x]Decreased RC/scapular/core strength and neuromuscular control   []other:      Functional Activity Limitations (from functional questionnaire and intake)   [x]Reduced ability to tolerate prolonged functional positions   [x]Reduced ability or difficulty with changes of positions or transfers between positions   [x]Reduced ability to maintain good posture and demonstrate good body mechanics with sitting, bending, and lifting   [x] Reduced ability or tolerance with driving and/or computer work   [x]Reduced ability to sleep   [x]Reduced ability to perform lifting, reaching, carrying tasks   [x]Reduced ability to tolerate impact through UE   [x]Reduced ability to reach behind back   [x]Reduced ability to

## 2020-02-27 NOTE — PROGRESS NOTES
HISTORY OF PRESENT ILLNESS: The patient returns today for the first postoperative visit after left shoulder video arthroscopy subacromial decompression, distal clavicle excision and rotator cuff repair with rotation medical graft only. Pain control has been satisfactory with oral medications. There have been no fevers or chills. DOS: February 19, 2020    Pain Assessment  Location of Pain: Shoulder  Location Modifiers: Left  Severity of Pain: 0  Relieving Factors: Rest, Ice  Result of Injury: No  Work-Related Injury: No  Are there other pain locations you wish to document?: No]      PHYSICAL EXAMINATION: Inspection reveals expected swelling. Portal sites are clean and dry. The skin is warm. Range of motion is limited by pain and swelling. The distal neurovascular exam is grossly intact. ASSESSMENT AND PLAN: Doing well after left shoulder video arthroscopy subacromial decompression, distal clavicle excision and rotator cuff repair with rotation medical graft only. Sutures are removed, Steri-Strips applied, intraoperative findings were discussed with the patient. I recommend ice, judicious use of NSAIDs, and physical therapy to diminish swelling and restore both motion and strength. I cautioned against overusing the shoulder and stated sling use can and should be minimized over the next 7-10 days. They verbalized good understanding of the plan and will follow up in 5-6 weeks. Diagnosis Orders   1. Rotator cuff impingement syndrome of left shoulder     2.  Status post arthroscopy of left shoulder           Emma Epperson DO

## 2020-02-27 NOTE — FLOWSHEET NOTE
[facility], [address] [place] [address] [address]  Phone: [phone]   Fax:     [phone]      Physical Therapy Treatment Note/ Progress Report:     Date:  2020    Patient Name:  Dolphus Angelucci    :  1950  MRN: [id number]  Restrictions/Precautions:    Medical/Treatment Diagnosis Information:  · Diagnosis: PT: s/p L shoulder RTC repair (PATCH only), SAD 20  · Treatment Diagnosis: L shoulder pain O07.49  Insurance/Certification information:  PT Insurance Information: Medicare  Physician Information:  Referring Practitioner: Naa Thornton  Has the plan of care been signed (Y/N):        []  Yes  [x]  No     Date of Patient follow up with Physician: 3/26/20      Is this a Progress Report:     []  Yes  [x]  No        If Yes:  Date Range for reporting period:  Beginning20  Ending     Progress report will be due (10 Rx or 30 days whichever is less): 3/15/58       Recertification will be due (POC Duration  / 90 days whichever is less): 20       Visit # Insurance Allowable Auth Required   1 Med Nec []  Yes []  No        Functional Scale: QDASH; 68%    Date assessed:  20      Latex Allergy:  [x]NO      []YES  Preferred Language for Healthcare:   [x]English       []other:      Pain level:  2/10     SUBJECTIVE:  See eval    OBJECTIVE: See eval   Observation:    Test measurements:      RESTRICTIONS/PRECAUTIONS: Minimize sling 7-10 days    Exercises/Interventions:   Therapeutic Ex (43242) Sets/sec Reps Notes/CUES HEP   Shrugs  Scap squeezes x10  x10      Seated P ER x10      Table slides flex  Table slides ER x10  x10      Table slides flex walk outs x10                                                              Manual Intervention (67011)       Joint oscillations, PROM 10'                                         NMR re-education (69694)   CUES NEEDED Therapeutic Activity (23173)                                                     Therapeutic Exercise and NMR EXR  [] (40371) Provided verbal/tactile cueing for activities related to strengthening, flexibility, endurance, ROM  for improvements in scapular, scapulothoracic and UE control with self care, reaching, carrying, lifting, house/yardwork, driving/computer work.    [] (90714) Provided verbal/tactile cueing for activities related to improving balance, coordination, kinesthetic sense, posture, motor skill, proprioception  to assist with  scapular, scapulothoracic and UE control with self care, reaching, carrying, lifting, house/yardwork, driving/computer work. Therapeutic Activities:    [] (10121 or 68769) Provided verbal/tactile cueing for activities related to improving balance, coordination, kinesthetic sense, posture, motor skill, proprioception and motor activation to allow for proper function of scapular, scapulothoracic and UE control with self care, carrying, lifting, driving/computer work.      Home Exercise Program:    [x] (72220) Reviewed/Progressed HEP activities related to strengthening, flexibility, endurance, ROM of scapular, scapulothoracic and UE control with self care, reaching, carrying, lifting, house/yardwork, driving/computer work  [] (10023) Reviewed/Progressed HEP activities related to improving balance, coordination, kinesthetic sense, posture, motor skill, proprioception of scapular, scapulothoracic and UE control with self care, reaching, carrying, lifting, house/yardwork, driving/computer work      Manual Treatments:  PROM / STM / Oscillations-Mobs:  G-I, II, III, IV (PA's, Inf., Post.)  [] (93447) Provided manual therapy to mobilize soft tissue/joints of cervical/CT, scapular GHJ and UE for the purpose of modulating pain, promoting relaxation,  increasing ROM, reducing/eliminating soft tissue swelling/inflammation/restriction, improving soft tissue extensibility and allowing for Adjusted     5. Patient will be able to return to PLOF including fishing and playing bingo without symptoms. [] Progressing: [] Met: [] Not Met: [] Adjusted        Overall Progression Towards Functional goals/ Treatment Progress Update:  [] Patient is progressing as expected towards functional goals listed. [] Progression is slowed due to complexities/Impairments listed. [] Progression has been slowed due to co-morbidities. [x] Plan just implemented, too soon to assess goals progression <30days   [] Goals require adjustment due to lack of progress  [] Patient is not progressing as expected and requires additional follow up with physician  [] Other    Prognosis for POC: [x] Good [] Fair  [] Poor      Patient requires continued skilled intervention: [x] Yes  [] No    Treatment/Activity Tolerance:  [x] Patient able to complete treatment  [] Patient limited by fatigue  [] Patient limited by pain    [] Patient limited by other medical complications  [] Other:       PLAN: See eval  [] Continue per plan of care [] Alter current plan (see comments above)  [x] Plan of care initiated [] Hold pending MD visit [] Discharge    Electronically signed by:  Ashley Stevens PT    Note: If patient does not return for scheduled/ recommended follow up visits, this note will serve as a discharge from care along with most recent update on progress.

## 2020-03-02 ENCOUNTER — HOSPITAL ENCOUNTER (OUTPATIENT)
Dept: PHYSICAL THERAPY | Age: 70
Setting detail: THERAPIES SERIES
Discharge: HOME | End: 2020-03-02
Payer: MEDICARE

## 2020-03-02 PROCEDURE — 97016 VASOPNEUMATIC DEVICE THERAPY: CPT | Performed by: PHYSICAL THERAPIST

## 2020-03-02 PROCEDURE — 97110 THERAPEUTIC EXERCISES: CPT | Performed by: PHYSICAL THERAPIST

## 2020-03-02 PROCEDURE — 97140 MANUAL THERAPY 1/> REGIONS: CPT | Performed by: PHYSICAL THERAPIST

## 2020-03-02 NOTE — FLOWSHEET NOTE
[address] [place] [address] and Sports Rehabilitation[address]  Phone: [phone]   Fax:     [phone]      Physical Therapy Treatment Note/ Progress Report:     Date:  3/2/2020    Patient Name:  Socrates Odell    :  1950  MRN: [id number]  Restrictions/Precautions:    Medical/Treatment Diagnosis Information:  · Diagnosis: PT: s/p L shoulder RTC repair (PATCH only), SAD 20  · Treatment Diagnosis: L shoulder pain M48.62  Insurance/Certification information:  PT Insurance Information: Medicare  Physician Information:  Referring Practitioner: Dari Schultz  Has the plan of care been signed (Y/N):        []  Yes  [x]  No     Date of Patient follow up with Physician: 3/26/20      Is this a Progress Report:     []  Yes  [x]  No        If Yes:  Date Range for reporting period:  Beginning20  Ending     Progress report will be due (10 Rx or 30 days whichever is less):        Recertification will be due (POC Duration  / 90 days whichever is less): 20       Visit # Insurance Allowable Auth Required   2 Med Nec []  Yes []  No        Functional Scale: QDASH; 68%    Date assessed:  20      Latex Allergy:  [x]NO      []YES  Preferred Language for Healthcare:   [x]English       []other:      Pain level:  2/10     SUBJECTIVE:  States pain has been increasing over the weekend since weaning from sling.  States she is unable to complete table slides as her table is the wrong height (corrected form with noted improvement.)    OBJECTIVE: See eval   Observation:    Test measurements:   PROM: Flex: 128 degrees ER: 70 degrees    RESTRICTIONS/PRECAUTIONS: Minimize sling 7-10 days    Exercises/Interventions:   Therapeutic Ex (98432) Sets/sec Reps Notes/CUES HEP   Shrugs  Scap squeezes x10  x10      Seated P ER x10          Table slides flex walk outs x10      Pulleys 5'      Supine cane ER  Supine cane flex x10  x10 cervical/CT, scapular GHJ and UE for the purpose of modulating pain, promoting relaxation,  increasing ROM, reducing/eliminating soft tissue swelling/inflammation/restriction, improving soft tissue extensibility and allowing for proper ROM for normal function with self care, reaching, carrying, lifting, house/yardwork, driving/computer work    Modalities:     [x] GAME READY (VASO)- for significant edema, swelling, pain control. Charges:  Timed Code Treatent Minutes: 30   Total Treatment Minutes: 40      [] EVAL (LOW) 27885 (typically 20 minutes face-to-face)  [] EVAL (MOD) 60626 (typically 30 minutes face-to-face)  [] EVAL (HIGH) 89382 (typically 45 minutes face-to-face)  [] RE-EVAL     [x] NV(89230) x     [] IONTO  [] NMR (78259) x     [x] VASO  [x] Manual (69153) x     [] Other:  [] TA x      [] Mech Traction (41771)  [] ES(attended) (91498)      [] ES (un) (92627):    ASSESSMENT:  Corrected form with table slides with noted improvement. Instructed to wean from sling, but continue to use for short periods for comfort as needed. GOALS:     Patient stated goal: Return to fishing    Therapist goals for Patient:   Short Term Goals: To be achieved in: 2 weeks  1. Independent in HEP and progression per patient tolerance, in order to prevent re-injury. [] Progressing: [] Met: [] Not Met: [] Adjusted     2. Patient will have a decrease in pain to facilitate improvement in movement, function, and ADLs as indicated by Functional Deficits. [] Progressing: [] Met: [] Not Met: [] Adjusted     Long Term Goals: To be achieved in: 12 weeks  1. Disability index score of 10% or less for the DASH to assist with reaching prior level of function. [] Progressing: [] Met: [] Not Met: [] Adjusted     2. Patient will demonstrate increased AROM to [facility] to allow for proper joint functioning as indicated by patients Functional Deficits. [] Progressing: [] Met: [] Not Met: [] Adjusted     3.  Patient will demonstrate an increase

## 2020-03-04 ENCOUNTER — HOSPITAL ENCOUNTER (OUTPATIENT)
Dept: PHYSICAL THERAPY | Age: 70
Setting detail: THERAPIES SERIES
Discharge: HOME | End: 2020-03-04
Payer: MEDICARE

## 2020-03-05 ENCOUNTER — APPOINTMENT (OUTPATIENT)
Dept: PHYSICAL THERAPY | Age: 70
End: 2020-03-05
Payer: MEDICARE

## 2020-03-06 ENCOUNTER — HOSPITAL ENCOUNTER (INPATIENT)
Age: 70
LOS: 2 days | Discharge: HOME | DRG: 552 | End: 2020-03-08
Attending: EMERGENCY MEDICINE | Admitting: INTERNAL MEDICINE
Payer: MEDICARE

## 2020-03-06 ENCOUNTER — APPOINTMENT (OUTPATIENT)
Dept: CT IMAGING | Age: 70
DRG: 552 | End: 2020-03-06
Payer: MEDICARE

## 2020-03-06 PROBLEM — M48.061 DEGENERATIVE LUMBAR SPINAL STENOSIS: Status: ACTIVE | Noted: 2020-03-06

## 2020-03-06 LAB
ANION GAP SERPL CALCULATED.3IONS-SCNC: 13 MMOL/L (ref 3–16)
BASOPHILS ABSOLUTE: 0.1 K/UL (ref 0–0.2)
BASOPHILS RELATIVE PERCENT: 1.1 %
BUN BLDV-MCNC: 25 MG/DL (ref 7–20)
CALCIUM SERPL-MCNC: 9.6 MG/DL (ref 8.3–10.6)
CHLORIDE BLD-SCNC: 98 MMOL/L (ref 99–110)
CO2: 26 MMOL/L (ref 21–32)
CREAT SERPL-MCNC: 0.8 MG/DL (ref 0.6–1.2)
EOSINOPHILS ABSOLUTE: 0.3 K/UL (ref 0–0.6)
EOSINOPHILS RELATIVE PERCENT: 3 %
GFR AFRICAN AMERICAN: >60
GFR NON-AFRICAN AMERICAN: >60
GLUCOSE BLD-MCNC: 109 MG/DL (ref 70–99)
HCT VFR BLD CALC: 42.1 % (ref 36–48)
HEMOGLOBIN: 14.2 G/DL (ref 12–16)
LYMPHOCYTES ABSOLUTE: 3.7 K/UL (ref 1–5.1)
LYMPHOCYTES RELATIVE PERCENT: 32.2 %
MCH RBC QN AUTO: 32.7 PG (ref 26–34)
MCHC RBC AUTO-ENTMCNC: 33.6 G/DL (ref 31–36)
MCV RBC AUTO: 97.1 FL (ref 80–100)
MONOCYTES ABSOLUTE: 1.2 K/UL (ref 0–1.3)
MONOCYTES RELATIVE PERCENT: 10.8 %
NEUTROPHILS ABSOLUTE: 6.1 K/UL (ref 1.7–7.7)
NEUTROPHILS RELATIVE PERCENT: 52.9 %
PDW BLD-RTO: 13.3 % (ref 12.4–15.4)
PLATELET # BLD: 413 K/UL (ref 135–450)
PMV BLD AUTO: 7.7 FL (ref 5–10.5)
POTASSIUM SERPL-SCNC: 3.7 MMOL/L (ref 3.5–5.1)
RBC # BLD: 4.34 M/UL (ref 4–5.2)
SODIUM BLD-SCNC: 137 MMOL/L (ref 136–145)
TSH SERPL DL<=0.05 MIU/L-ACNC: 1.27 UIU/ML (ref 0.27–4.2)
WBC # BLD: 11.4 K/UL (ref 4–11)

## 2020-03-06 PROCEDURE — 84443 ASSAY THYROID STIM HORMONE: CPT

## 2020-03-06 PROCEDURE — 72131 CT LUMBAR SPINE W/O DYE: CPT

## 2020-03-06 PROCEDURE — 6370000000 HC RX 637 (ALT 250 FOR IP): Performed by: INTERNAL MEDICINE

## 2020-03-06 PROCEDURE — 94150 VITAL CAPACITY TEST: CPT

## 2020-03-06 PROCEDURE — 6360000002 HC RX W HCPCS: Performed by: EMERGENCY MEDICINE

## 2020-03-06 PROCEDURE — 72192 CT PELVIS W/O DYE: CPT

## 2020-03-06 PROCEDURE — 96374 THER/PROPH/DIAG INJ IV PUSH: CPT

## 2020-03-06 PROCEDURE — 2580000003 HC RX 258: Performed by: INTERNAL MEDICINE

## 2020-03-06 PROCEDURE — 80048 BASIC METABOLIC PNL TOTAL CA: CPT

## 2020-03-06 PROCEDURE — 85025 COMPLETE CBC W/AUTO DIFF WBC: CPT

## 2020-03-06 PROCEDURE — 99284 EMERGENCY DEPT VISIT MOD MDM: CPT

## 2020-03-06 PROCEDURE — 1200000000 HC SEMI PRIVATE

## 2020-03-06 PROCEDURE — 6360000002 HC RX W HCPCS: Performed by: INTERNAL MEDICINE

## 2020-03-06 RX ORDER — ACETAMINOPHEN 325 MG/1
650 TABLET ORAL EVERY 6 HOURS PRN
Status: DISCONTINUED | OUTPATIENT
Start: 2020-03-06 | End: 2020-03-08 | Stop reason: HOSPADM

## 2020-03-06 RX ORDER — MONTELUKAST SODIUM 10 MG/1
10 TABLET ORAL NIGHTLY
Status: DISCONTINUED | OUTPATIENT
Start: 2020-03-06 | End: 2020-03-08 | Stop reason: HOSPADM

## 2020-03-06 RX ORDER — ONDANSETRON 2 MG/ML
4 INJECTION INTRAMUSCULAR; INTRAVENOUS EVERY 6 HOURS PRN
Status: DISCONTINUED | OUTPATIENT
Start: 2020-03-06 | End: 2020-03-08 | Stop reason: HOSPADM

## 2020-03-06 RX ORDER — ATORVASTATIN CALCIUM 10 MG/1
20 TABLET, FILM COATED ORAL DAILY
Status: DISCONTINUED | OUTPATIENT
Start: 2020-03-06 | End: 2020-03-08 | Stop reason: HOSPADM

## 2020-03-06 RX ORDER — OXYCODONE HYDROCHLORIDE AND ACETAMINOPHEN 5; 325 MG/1; MG/1
2 TABLET ORAL EVERY 4 HOURS PRN
Status: DISCONTINUED | OUTPATIENT
Start: 2020-03-06 | End: 2020-03-08 | Stop reason: HOSPADM

## 2020-03-06 RX ORDER — ESCITALOPRAM OXALATE 10 MG/1
10 TABLET ORAL DAILY
Status: DISCONTINUED | OUTPATIENT
Start: 2020-03-06 | End: 2020-03-08 | Stop reason: HOSPADM

## 2020-03-06 RX ORDER — LEVOTHYROXINE SODIUM 0.05 MG/1
75 TABLET ORAL DAILY
Status: DISCONTINUED | OUTPATIENT
Start: 2020-03-06 | End: 2020-03-08 | Stop reason: HOSPADM

## 2020-03-06 RX ORDER — POLYETHYLENE GLYCOL 3350 17 G/17G
17 POWDER, FOR SOLUTION ORAL DAILY PRN
Status: DISCONTINUED | OUTPATIENT
Start: 2020-03-06 | End: 2020-03-08 | Stop reason: HOSPADM

## 2020-03-06 RX ORDER — ACETAMINOPHEN 650 MG/1
650 SUPPOSITORY RECTAL EVERY 6 HOURS PRN
Status: DISCONTINUED | OUTPATIENT
Start: 2020-03-06 | End: 2020-03-08 | Stop reason: HOSPADM

## 2020-03-06 RX ORDER — SODIUM CHLORIDE 0.9 % (FLUSH) 0.9 %
10 SYRINGE (ML) INJECTION EVERY 12 HOURS SCHEDULED
Status: DISCONTINUED | OUTPATIENT
Start: 2020-03-06 | End: 2020-03-08 | Stop reason: HOSPADM

## 2020-03-06 RX ORDER — PANTOPRAZOLE SODIUM 40 MG/1
40 TABLET, DELAYED RELEASE ORAL
Status: DISCONTINUED | OUTPATIENT
Start: 2020-03-07 | End: 2020-03-08 | Stop reason: HOSPADM

## 2020-03-06 RX ORDER — DEXAMETHASONE SODIUM PHOSPHATE 4 MG/ML
4 INJECTION, SOLUTION INTRA-ARTICULAR; INTRALESIONAL; INTRAMUSCULAR; INTRAVENOUS; SOFT TISSUE EVERY 6 HOURS
Status: DISCONTINUED | OUTPATIENT
Start: 2020-03-06 | End: 2020-03-08

## 2020-03-06 RX ORDER — LOSARTAN POTASSIUM 100 MG/1
100 TABLET ORAL DAILY
Status: DISCONTINUED | OUTPATIENT
Start: 2020-03-06 | End: 2020-03-08 | Stop reason: HOSPADM

## 2020-03-06 RX ORDER — HYDROCHLOROTHIAZIDE 25 MG/1
25 TABLET ORAL DAILY
Status: DISCONTINUED | OUTPATIENT
Start: 2020-03-06 | End: 2020-03-08 | Stop reason: HOSPADM

## 2020-03-06 RX ORDER — OXYCODONE HYDROCHLORIDE AND ACETAMINOPHEN 5; 325 MG/1; MG/1
1 TABLET ORAL EVERY 4 HOURS PRN
Status: DISCONTINUED | OUTPATIENT
Start: 2020-03-06 | End: 2020-03-08 | Stop reason: HOSPADM

## 2020-03-06 RX ORDER — SODIUM CHLORIDE 0.9 % (FLUSH) 0.9 %
10 SYRINGE (ML) INJECTION PRN
Status: DISCONTINUED | OUTPATIENT
Start: 2020-03-06 | End: 2020-03-08 | Stop reason: HOSPADM

## 2020-03-06 RX ORDER — DEXAMETHASONE SODIUM PHOSPHATE 4 MG/ML
10 INJECTION, SOLUTION INTRA-ARTICULAR; INTRALESIONAL; INTRAMUSCULAR; INTRAVENOUS; SOFT TISSUE ONCE
Status: COMPLETED | OUTPATIENT
Start: 2020-03-06 | End: 2020-03-06

## 2020-03-06 RX ORDER — PROMETHAZINE HYDROCHLORIDE 25 MG/1
12.5 TABLET ORAL EVERY 6 HOURS PRN
Status: DISCONTINUED | OUTPATIENT
Start: 2020-03-06 | End: 2020-03-08 | Stop reason: HOSPADM

## 2020-03-06 RX ORDER — LOSARTAN POTASSIUM AND HYDROCHLOROTHIAZIDE 25; 100 MG/1; MG/1
1 TABLET ORAL DAILY
Status: DISCONTINUED | OUTPATIENT
Start: 2020-03-06 | End: 2020-03-06 | Stop reason: CLARIF

## 2020-03-06 RX ORDER — ALBUTEROL SULFATE 90 UG/1
2 AEROSOL, METERED RESPIRATORY (INHALATION) 4 TIMES DAILY PRN
Status: DISCONTINUED | OUTPATIENT
Start: 2020-03-06 | End: 2020-03-08 | Stop reason: HOSPADM

## 2020-03-06 RX ADMIN — DEXAMETHASONE SODIUM PHOSPHATE 10 MG: 4 INJECTION, SOLUTION INTRAMUSCULAR; INTRAVENOUS at 16:23

## 2020-03-06 RX ADMIN — Medication 10 ML: at 21:53

## 2020-03-06 RX ADMIN — OXYCODONE HYDROCHLORIDE AND ACETAMINOPHEN 2 TABLET: 5; 325 TABLET ORAL at 21:32

## 2020-03-06 RX ADMIN — DEXAMETHASONE SODIUM PHOSPHATE 4 MG: 4 INJECTION, SOLUTION INTRAMUSCULAR; INTRAVENOUS at 21:53

## 2020-03-06 ASSESSMENT — PAIN DESCRIPTION - LOCATION
LOCATION: LEG
LOCATION: LEG

## 2020-03-06 ASSESSMENT — PAIN DESCRIPTION - ORIENTATION
ORIENTATION: RIGHT
ORIENTATION: RIGHT

## 2020-03-06 ASSESSMENT — PAIN DESCRIPTION - FREQUENCY
FREQUENCY: INTERMITTENT
FREQUENCY: CONTINUOUS

## 2020-03-06 ASSESSMENT — PAIN SCALES - GENERAL
PAINLEVEL_OUTOF10: 8
PAINLEVEL_OUTOF10: 5
PAINLEVEL_OUTOF10: 8

## 2020-03-06 ASSESSMENT — PAIN DESCRIPTION - ONSET: ONSET: ON-GOING

## 2020-03-06 ASSESSMENT — PAIN DESCRIPTION - PROGRESSION: CLINICAL_PROGRESSION: NOT CHANGED

## 2020-03-06 ASSESSMENT — PAIN DESCRIPTION - PAIN TYPE: TYPE: ACUTE PAIN

## 2020-03-06 ASSESSMENT — PAIN DESCRIPTION - DESCRIPTORS
DESCRIPTORS: SHARP
DESCRIPTORS: ACHING

## 2020-03-06 NOTE — FLOWSHEET NOTE
Admitted from ER per stretcher. Alert and oriented. Daughter at bedside. Oriented to room, bed, call light, phone and T.V.  [address] intact in Rt. Ac area. NPC. LUngs sound clear on ascultation. Abdomen large and soft with active BS on ascultation. Stated had a Bm this am.  Remains to void. Lt.shoulder incisions x 4 JON and healing. LUE with sling in place. Stated 2 weeks out of Lt.shoulder surgery. Neuro circ. checks stable. Unable to bear any weight on RLE. Stated has numbness and tingling in Rt.foot. Has sharp pain in RLE with movement. Denies any falls or injury. Denies any bowel or bladder incontinence. Pedal pulses palpable. Push/pulls strong bilaterally in legs. Feet warm, pink with brisk cap refill. No pedal edema.

## 2020-03-06 NOTE — ED PROVIDER NOTES
eMERGENCY dEPARTMENT eNCOUnter      PtName: Tammi Powers  MRN: [id number]  [place] 1950  Date of evaluation: 3/6/2020  Provider: Nikki Sanches, [address] [place] [address]       Chief Complaint   Patient presents with    Leg Pain     Upper right leg weakness and pain, unable to bear weight, patient states sitting makes her right foot \"feel asleep\" no known injury, started last night at bedtime. HISTORY OF PRESENT ILLNESS   (Location/Symptom, Timing/Onset,Context/Setting, Quality, Duration, Modifying Factors, Severity) Note limiting factors. HPI    Tammi Powers is a 71 y.o. female who presents to the emergency department 78-year-old female presents the emergency department chief complaint of pain along the anterior aspect of the right leg. Sharp, 8/10 without radiation also feels like the right leg is giving out on her and she cannot ambulate secondary to this. No saddle anesthesia, fever, history of IV drug abuse or injuries. Also feels like she has some tingling at the top of her right foot. History of previous back issues with injections with Dr. Amber Hernandez. Nursing Notes were reviewed. REVIEW OF SYSTEMS    (2+ forlevel 4; 10+ for level 5)     Review of Systems  See hpi for further details. Complete 10 point review of all systems performed and is otherwise negative unless noted above.     PAST MEDICAL HISTORY     Past Medical History:   Diagnosis Date    Acromioclavicular joint arthritis 3/15/2018    Chronic back pain     Depression     w/Anxiety    HTN (hypertension)     Hypothyroid     Sarcoidosis        SURGICAL HISTORY       Past Surgical History:   Procedure Laterality Date    BRONCHOSCOPY       SECTION      LUNG BIOPSY      SHOULDER ARTHROSCOPY Left 2020    LEFT SHOULDER VIDEO ARTHROSCOPY, DISTAL CLAVICLE EXCISION, SUBACROMIAL DECOMPRESSION, ROTATOR CUFF REPAIR WITH ROTATION MEDICAL GRAFT performed by Damaris PresidentDO at [place] [address] Smoker    Smokeless tobacco: Never Used   Substance and Sexual Activity    Alcohol use: No    Drug use: No    Sexual activity: None   Lifestyle    Physical activity:     Days per week: None     Minutes per session: None    Stress: None   Relationships    Social connections:     Talks on phone: None     Gets together: None     Attends Alevism service: None     Active member of club or organization: None     Attends meetings of clubs or organizations: None     Relationship status: None    Intimate partner violence:     Fear of current or ex partner: None     Emotionally abused: None     Physically abused: None     Forced sexual activity: None   Other Topics Concern    None   Social History Narrative    None       SCREENINGS           PHYSICAL EXAM    (5+ for level 4, 8+ for level 5)     ED Triage Vitals [03/06/20 1435]   BP Temp Temp Source Pulse Resp SpO2 Height Weight   (!) 141/76 98.3 °F (36.8 °C) Oral 75 14 100 % 5' 2.5\" (1.588 m) 177 lb (80.3 kg)       Physical Exam  Vitals signs and nursing note reviewed. Constitutional:       General: She is not in acute distress. Appearance: Normal appearance. She is not toxic-appearing or diaphoretic. HENT:      Head: Normocephalic and atraumatic. Right Ear: External ear normal.      Left Ear: External ear normal.      Nose: Nose normal.      Mouth/Throat:      Mouth: Mucous membranes are moist.      Pharynx: Oropharynx is clear. Eyes:      General: No scleral icterus. Right eye: No discharge. Left eye: No discharge. Extraocular Movements: Extraocular movements intact. Conjunctiva/sclera: Conjunctivae normal.      Pupils: Pupils are equal, round, and reactive to light. Neck:      Musculoskeletal: Normal range of motion and neck supple. Cardiovascular:      Rate and Rhythm: Normal rate and regular rhythm. Pulses: Normal pulses. Heart sounds: Normal heart sounds. No murmur. No friction rub. No gallop. REFERRED TO:  No follow-up provider specified. DISCHARGE MEDICATIONS:  New Prescriptions    No medications on file          (Please note:  Portions of this note were completed with a voice recognition program. Efforts were made to edit the dictations but occasionally words and phrases are mis-transcribed.)    Form v2016. J.5-cn    Lan Luna DO (electronically signed)  Emergency Medicine Provider              Eric Carney DO  03/06/20 0822

## 2020-03-06 NOTE — H&P
Hospital Medicine History & Physical      PCP: Amarilis Santacruz DO    Date of Admission: 3/6/2020     Date of Service: Pt seen/examined on 20 and Admitted to Inpatient with expected LOS greater than two midnights due to medical therapy of symptomatic lumbar spinal stenosis    Chief Complaint: Right anterior thigh pain, tingling on the dorsal surface of the right foot. History Of Present Illness:     71 y.o. female who presented to [facility] with right anterior thigh pain, tingling dorsum foot that started yesterday in the evening and got progressively worse. Unable to climb stairs. Unable to walk more than a couple steps without assistance. No shooting pain. No urine or fecal incontinence. Came to the emergency room because of progressive weakness and inability to climb steps. Evaluation was done in the emergency room, neurosurgery was consulted over the phone and patient was admitted. Never had anything like this before  Nothing else that makes the patient feel better or worse  No other accompanying symptoms. Past Medical History:          Diagnosis Date    Acromioclavicular joint arthritis 3/15/2018    Chronic back pain     Depression     w/Anxiety    HTN (hypertension)     Hypothyroid     Sarcoidosis        Past Surgical History:          Procedure Laterality Date    BRONCHOSCOPY       SECTION      LUNG BIOPSY      SHOULDER ARTHROSCOPY Left 2020    LEFT SHOULDER VIDEO ARTHROSCOPY, DISTAL CLAVICLE EXCISION, SUBACROMIAL DECOMPRESSION, ROTATOR CUFF REPAIR WITH ROTATION MEDICAL GRAFT performed by Leticia Galeana DO at [facility] Left 2020    LEFT SHOULDER VIDEO ARTHROSCOPY, DISTAL CLAVICLE EXCISION, SUBACROMIAL DECOMPRESSION, ROTATOR CUFF REPAIR WITH ROTATION MEDICAL GRAFT       Medications Prior to Admission:      Prior to Admission medications    Medication Sig Start Date End Date Taking?  Authorizing Provider   ibuprofen

## 2020-03-07 LAB
A/G RATIO: 1 (ref 1.1–2.2)
ALBUMIN SERPL-MCNC: 3.8 G/DL (ref 3.4–5)
ALP BLD-CCNC: 122 U/L (ref 40–129)
ALT SERPL-CCNC: 15 U/L (ref 10–40)
ANION GAP SERPL CALCULATED.3IONS-SCNC: 10 MMOL/L (ref 3–16)
AST SERPL-CCNC: 15 U/L (ref 15–37)
BASOPHILS ABSOLUTE: 0.1 K/UL (ref 0–0.2)
BASOPHILS RELATIVE PERCENT: 0.7 %
BILIRUB SERPL-MCNC: 0.3 MG/DL (ref 0–1)
BUN BLDV-MCNC: 29 MG/DL (ref 7–20)
CALCIUM SERPL-MCNC: 9.7 MG/DL (ref 8.3–10.6)
CHLORIDE BLD-SCNC: 99 MMOL/L (ref 99–110)
CO2: 27 MMOL/L (ref 21–32)
CREAT SERPL-MCNC: 0.6 MG/DL (ref 0.6–1.2)
EOSINOPHILS ABSOLUTE: 0 K/UL (ref 0–0.6)
EOSINOPHILS RELATIVE PERCENT: 0 %
GFR AFRICAN AMERICAN: >60
GFR NON-AFRICAN AMERICAN: >60
GLOBULIN: 3.8 G/DL
GLUCOSE BLD-MCNC: 187 MG/DL (ref 70–99)
HCT VFR BLD CALC: 41.6 % (ref 36–48)
HEMOGLOBIN: 14.1 G/DL (ref 12–16)
LYMPHOCYTES ABSOLUTE: 1.4 K/UL (ref 1–5.1)
LYMPHOCYTES RELATIVE PERCENT: 14 %
MCH RBC QN AUTO: 33.2 PG (ref 26–34)
MCHC RBC AUTO-ENTMCNC: 33.9 G/DL (ref 31–36)
MCV RBC AUTO: 98 FL (ref 80–100)
MONOCYTES ABSOLUTE: 0.2 K/UL (ref 0–1.3)
MONOCYTES RELATIVE PERCENT: 1.7 %
NEUTROPHILS ABSOLUTE: 8.1 K/UL (ref 1.7–7.7)
NEUTROPHILS RELATIVE PERCENT: 83.6 %
PDW BLD-RTO: 13.1 % (ref 12.4–15.4)
PLATELET # BLD: 384 K/UL (ref 135–450)
PMV BLD AUTO: 7.4 FL (ref 5–10.5)
POTASSIUM REFLEX MAGNESIUM: 3.9 MMOL/L (ref 3.5–5.1)
RBC # BLD: 4.24 M/UL (ref 4–5.2)
SODIUM BLD-SCNC: 136 MMOL/L (ref 136–145)
TOTAL PROTEIN: 7.6 G/DL (ref 6.4–8.2)
WBC # BLD: 9.7 K/UL (ref 4–11)

## 2020-03-07 PROCEDURE — 6370000000 HC RX 637 (ALT 250 FOR IP): Performed by: INTERNAL MEDICINE

## 2020-03-07 PROCEDURE — 1200000000 HC SEMI PRIVATE

## 2020-03-07 PROCEDURE — 36415 COLL VENOUS BLD VENIPUNCTURE: CPT

## 2020-03-07 PROCEDURE — 80053 COMPREHEN METABOLIC PANEL: CPT

## 2020-03-07 PROCEDURE — 97161 PT EVAL LOW COMPLEX 20 MIN: CPT

## 2020-03-07 PROCEDURE — 2580000003 HC RX 258: Performed by: INTERNAL MEDICINE

## 2020-03-07 PROCEDURE — 97535 SELF CARE MNGMENT TRAINING: CPT

## 2020-03-07 PROCEDURE — 97165 OT EVAL LOW COMPLEX 30 MIN: CPT

## 2020-03-07 PROCEDURE — 85025 COMPLETE CBC W/AUTO DIFF WBC: CPT

## 2020-03-07 PROCEDURE — 6360000002 HC RX W HCPCS: Performed by: INTERNAL MEDICINE

## 2020-03-07 RX ADMIN — ATORVASTATIN CALCIUM 20 MG: 10 TABLET, FILM COATED ORAL at 08:50

## 2020-03-07 RX ADMIN — DEXAMETHASONE SODIUM PHOSPHATE 4 MG: 4 INJECTION, SOLUTION INTRAMUSCULAR; INTRAVENOUS at 03:35

## 2020-03-07 RX ADMIN — ACETAMINOPHEN 650 MG: 325 TABLET ORAL at 13:27

## 2020-03-07 RX ADMIN — DEXAMETHASONE SODIUM PHOSPHATE 4 MG: 4 INJECTION, SOLUTION INTRAMUSCULAR; INTRAVENOUS at 21:52

## 2020-03-07 RX ADMIN — LEVOTHYROXINE SODIUM 75 MCG: 0.05 TABLET ORAL at 08:50

## 2020-03-07 RX ADMIN — HYDROCHLOROTHIAZIDE 25 MG: 25 TABLET ORAL at 08:50

## 2020-03-07 RX ADMIN — Medication 10 ML: at 21:52

## 2020-03-07 RX ADMIN — MONTELUKAST SODIUM 10 MG: 10 TABLET ORAL at 21:52

## 2020-03-07 RX ADMIN — Medication 10 ML: at 08:50

## 2020-03-07 RX ADMIN — LOSARTAN POTASSIUM 100 MG: 100 TABLET, FILM COATED ORAL at 08:50

## 2020-03-07 RX ADMIN — ESCITALOPRAM OXALATE 10 MG: 10 TABLET ORAL at 08:50

## 2020-03-07 RX ADMIN — DEXAMETHASONE SODIUM PHOSPHATE 4 MG: 4 INJECTION, SOLUTION INTRAMUSCULAR; INTRAVENOUS at 11:51

## 2020-03-07 RX ADMIN — PANTOPRAZOLE SODIUM 40 MG: 40 TABLET, DELAYED RELEASE ORAL at 06:34

## 2020-03-07 RX ADMIN — DEXAMETHASONE SODIUM PHOSPHATE 4 MG: 4 INJECTION, SOLUTION INTRAMUSCULAR; INTRAVENOUS at 17:05

## 2020-03-07 ASSESSMENT — PAIN SCALES - GENERAL
PAINLEVEL_OUTOF10: 0
PAINLEVEL_OUTOF10: 8
PAINLEVEL_OUTOF10: 0
PAINLEVEL_OUTOF10: 1

## 2020-03-07 ASSESSMENT — PAIN DESCRIPTION - DESCRIPTORS: DESCRIPTORS: CRAMPING

## 2020-03-07 ASSESSMENT — PAIN DESCRIPTION - LOCATION: LOCATION: LEG

## 2020-03-07 ASSESSMENT — PAIN DESCRIPTION - ORIENTATION: ORIENTATION: RIGHT;LEFT

## 2020-03-07 ASSESSMENT — PAIN DESCRIPTION - PAIN TYPE: TYPE: ACUTE PAIN

## 2020-03-07 NOTE — PROGRESS NOTES
Occupational Therapy   Occupational Therapy Initial Assessment  Date: 3/7/2020   Patient Name: Vida Jhaveri  MRN: [id number]     : 1950    Date of Service: 3/7/2020    Discharge Recommendations:  Home independently; Continue OP shoulder rehab  OT Equipment Recommendations  Equipment Needed: No    Assessment   Assessment: Patient presents with no acute OT deficits, Sup/Mod Ind for all ADLs, mobility, transfers. No further acute OT indicated. Will sign off OT. Prognosis: Good  Decision Making: Low Complexity  OT Education: OT Role;Plan of Care  No Skilled OT: At baseline function; No OT goals identified  REQUIRES OT FOLLOW UP: No  Activity Tolerance  Activity Tolerance: Patient Tolerated treatment well  Safety Devices  Safety Devices in place: Yes  Type of devices: Left in bed;Bed alarm in place;Call light within reach;Nurse notified;Gait belt           Patient Diagnosis(es): The primary encounter diagnosis was Impaired ambulation. Diagnoses of Right leg pain and Lumbar radiculopathy were also pertinent to this visit. has a past medical history of Acromioclavicular joint arthritis, Chronic back pain, Depression, HTN (hypertension), Hypothyroid, Macular degeneration, age related, and Sarcoidosis. has a past surgical history that includes Lung biopsy;  section; bronchoscopy (); shoulder surgery (Left, 2020); and Shoulder arthroscopy (Left, 2020).            Restrictions  Restrictions/Precautions  Restrictions/Precautions: Fall Risk, Up as Tolerated  Position Activity Restriction  Other position/activity restrictions: up with assistance    Subjective   General  Chart Reviewed: Yes, Orders  Patient assessed for rehabilitation services?: Yes  Family / Caregiver Present: Yes(friend)  Referring Practitioner: Domenica Moody MD  Subjective  Subjective: Patient agreeable to OT evaluation  General Comment  Comments: RN cleared for treatment-per keo SERRANO to ambulate in room  Patient Currently Supervision  Stand to sit: Supervision  Vision - Basic Assessment  Prior Vision: Wears glasses all the time  Cognition  Overall Cognitive Status: WNL  Perception  Overall Perceptual Status: WFL     Sensation  Overall Sensation Status: WNL        LUE AROM (degrees)  LUE AROM : Exceptions  LUE General AROM: limited AROM following shoulder sx  RUE AROM (degrees)  RUE AROM : WFL  LUE Strength  Gross LUE Strength: WFL  RUE Strength  Gross RUE Strength: WFL                   Plan   Plan  Times per week: 1x eval & treat      AM-PAC Score  AM-PAC Inpatient Daily Activity Raw Score: 24 (03/07/20 1407)  AM-PAC Inpatient ADL T-Scale Score : 57.54 (03/07/20 1407)  ADL Inpatient CMS 0-100% Score: 0 (03/07/20 1407)  ADL Inpatient CMS G-Code Modifier : [address] (03/07/20 1407)    Goals  Short term goals  Time Frame for Short term goals: 1x eval & treat  Short term goal 1: Patient will complete functional ADL/toilet transfer Sup GOAL MET 3/07/20  Short term goal 2: Patient will complete LB dressing Mod Ind GOAL MET 3/07/20  Patient Goals   Patient goals : \"Get home asap. \"       Therapy Time   Individual Concurrent Group Co-treatment   Time In 3311         Time Out 1354         Minutes 20         Timed Code Treatment Minutes: 10 Minutes(10 min eval)       Mitra Lubin OT

## 2020-03-07 NOTE — PROGRESS NOTES
degeneration, age related, and Sarcoidosis. has a past surgical history that includes Lung biopsy;  section; bronchoscopy (); shoulder surgery (Left, 2020); and Shoulder arthroscopy (Left, 2020).     Restrictions  Restrictions/Precautions  Restrictions/Precautions: Fall Risk, Up as Tolerated  Position Activity Restriction  Other position/activity restrictions: up with assistance  Vision/Hearing  Vision: Impaired  Vision Exceptions: Wears glasses at all times  Hearing: Within functional limits     Subjective  General  Chart Reviewed: Yes  Patient assessed for rehabilitation services?: Yes  Response To Previous Treatment: Not applicable  Family / Caregiver Present: Yes(sister)  Referring Practitioner: Alicia Fierro  Referral Date : 20  Diagnosis: RLE pain, lumbar spinal stenosis  Follows Commands: Within Functional Limits  Subjective  Subjective: pt in bed, agreeable to ambulation but requesting to try stairs tomorrow  Pain Screening  Patient Currently in Pain: Yes  Pain Assessment  Pain Assessment: 0-10  Pain Level: 1  Pain Type: Acute pain  Pain Location: Leg  Pain Orientation: Right;Left  Pain Descriptors: Cramping  Non-Pharmaceutical Pain Intervention(s): Ambulation/Increased Activity;Repositioned  Vital Signs  Patient Currently in Pain: Yes       Orientation  Orientation  Overall Orientation Status: Within Normal Limits  Social/Functional History  Social/Functional History  Lives With: Alone  Type of Home: Apartment  Home Layout: One level  Home Access: Stairs to enter with rails  Entrance Stairs - Number of Steps: 2 (outside) 6+6+6 (inside) steps to apartment  Bathroom Shower/Tub: Tub/Shower unit  Bathroom Toilet: Standard  ADL Assistance: Independent  Homemaking Assistance: Independent  Homemaking Responsibilities: Yes  Ambulation Assistance: Independent  Transfer Assistance: Independent  Active : Yes  Occupation: Retired  Type of occupation: medical records at [employer] Pt will complete transfers independently. Short term goal 3: Pt will ambulate 100 ft independently. Short term goal 4: Pt will climb 6 stairs with supervision.   Patient Goals   Patient goals : \"go home\"       Therapy Time   Individual Concurrent Group Co-treatment   Time In 1537         Time Out 4919         Minutes 14         Timed Code Treatment Minutes: 4 Minutes       Augustine Caldwell, PT

## 2020-03-07 NOTE — PROGRESS NOTES
RESPIRATORY THERAPY ASSESSMENT    Name:  Austin Monzon. Record Number:  [id number]  Age: 71 y.o. Gender: female  : 1950  Today's Date:  3/6/2020  Room:  [facility]/8231-12    Assessment     Is the patient being admitted for a COPD or Asthma exacerbation? No   (If yes the patient will be seen every 4 hours for the first 24 hours and then reassessed)    Patient Admission Diagnosis      Allergies  Allergies   Allergen Reactions    Codeine Other (See Comments)     Skin crawls       Minimum Predicted Vital Capacity:     860          Actual Vital Capacity:      2000              Pulmonary History:No history  Home Oxygen Therapy:  room air  Home Respiratory Therapy:ALB MDI PRN   Current Respiratory Therapy:  Alb MDI PRN          Respiratory Severity Index(RSI)   Patients with orders for inhalation medications, oxygen, or any therapeutic treatment modality will be placed on Respiratory Protocol. They will be assessed with the first treatment and at least every 72 hours thereafter. The following severity scale will be used to determine frequency of treatment intervention.     Smoking History: No Smoking History = 0    Social History  Social History     Tobacco Use    Smoking status: Never Smoker    Smokeless tobacco: Never Used   Substance Use Topics    Alcohol use: No    Drug use: No       Recent Surgical History: None = 0  Past Surgical History  Past Surgical History:   Procedure Laterality Date    BRONCHOSCOPY       SECTION      LUNG BIOPSY      SHOULDER ARTHROSCOPY Left 2020    LEFT SHOULDER VIDEO ARTHROSCOPY, DISTAL CLAVICLE EXCISION, SUBACROMIAL DECOMPRESSION, ROTATOR CUFF REPAIR WITH ROTATION MEDICAL GRAFT performed by Servando Jasso DO at [facility] Left 2020    LEFT SHOULDER VIDEO ARTHROSCOPY, DISTAL CLAVICLE EXCISION, SUBACROMIAL DECOMPRESSION, ROTATOR CUFF REPAIR WITH ROTATION MEDICAL GRAFT       Level of Consciousness: Alert, Oriented, and Cooperative = 0    Level of Activity: Walking unassisted = 0    Respiratory Pattern: Regular Pattern; RR 8-20 = 0    Breath Sounds: Clear = 0    Sputum   ,  ,    Cough: Strong, spontaneous, non-productive = 0    Vital Signs   /74   Pulse 73   Temp 98 °F (36.7 °C) (Oral)   Resp 16   Ht 5' 2\" (1.575 m)   Wt 181 lb 10.5 oz (82.4 kg)   LMP  (LMP Unknown)   SpO2 92%   BMI 33.23 kg/m²   SPO2 (COPD values may differ): Greater than or equal to 92% on room air = 0    Peak Flow (asthma only): not applicable = 0    RSI: 0-4 = See once and convert to home regimen or discontinue        Plan       Goals:     Patient/caregiver was educated on the proper method of use for Respiratory Care Devices:  Yes      Level of patient/caregiver understanding able to:   ? Verbalize understanding   ? Demonstrate understanding       ? Teach back        ? Needs reinforcement       ? No available caregiver               ? Other:     Response to education:  Good     Is patient being placed on Home Treatment Regimen? Yes     Does the patient have everything they need prior to discharge? NA     Comments: patient assessed. Chart/meds reviewed. Plan of Care: home    Electronically signed by Angel Salazar RCP on 3/6/2020 at 9:29 PM    Respiratory Protocol Guidelines     1. Assessment and treatment by Respiratory Therapy will be initiated for medication and therapeutic interventions upon initiation of aerosolized medication. 2. Physician will be contacted for respiratory rate (RR) greater than 35 breaths per minute. Therapy will be held for heart rate (HR) greater than 140 beats per minute, pending direction from physician. 3. Bronchodilators will be administered via Metered Dose Inhaler (MDI) with spacer when the following criteria are met:  a. Alert and cooperative     b. HR < 140 bpm  c. RR < 30 bpm                d. Can demonstrate a 2-3 second inspiratory hold  4.  Bronchodilators will be administered via Hand Held Nebulizer

## 2020-03-08 ENCOUNTER — APPOINTMENT (OUTPATIENT)
Dept: MRI IMAGING | Age: 70
DRG: 552 | End: 2020-03-08
Payer: MEDICARE

## 2020-03-08 VITALS
RESPIRATION RATE: 16 BRPM | WEIGHT: 181.66 LBS | TEMPERATURE: 98.1 F | DIASTOLIC BLOOD PRESSURE: 68 MMHG | HEIGHT: 62 IN | OXYGEN SATURATION: 94 % | HEART RATE: 67 BPM | BODY MASS INDEX: 33.43 KG/M2 | SYSTOLIC BLOOD PRESSURE: 113 MMHG

## 2020-03-08 PROCEDURE — 6360000002 HC RX W HCPCS: Performed by: INTERNAL MEDICINE

## 2020-03-08 PROCEDURE — 6370000000 HC RX 637 (ALT 250 FOR IP): Performed by: INTERNAL MEDICINE

## 2020-03-08 PROCEDURE — 72148 MRI LUMBAR SPINE W/O DYE: CPT

## 2020-03-08 PROCEDURE — 97110 THERAPEUTIC EXERCISES: CPT

## 2020-03-08 PROCEDURE — 97116 GAIT TRAINING THERAPY: CPT

## 2020-03-08 PROCEDURE — 2580000003 HC RX 258: Performed by: INTERNAL MEDICINE

## 2020-03-08 RX ORDER — DEXAMETHASONE 4 MG/1
4 TABLET ORAL EVERY 8 HOURS SCHEDULED
Status: DISCONTINUED | OUTPATIENT
Start: 2020-03-08 | End: 2020-03-08 | Stop reason: HOSPADM

## 2020-03-08 RX ORDER — DEXAMETHASONE 4 MG/1
TABLET ORAL
Qty: 18 TABLET | Refills: 0 | Status: SHIPPED | OUTPATIENT
Start: 2020-03-08 | End: 2020-03-30 | Stop reason: SDUPTHER

## 2020-03-08 RX ADMIN — PANTOPRAZOLE SODIUM 40 MG: 40 TABLET, DELAYED RELEASE ORAL at 08:01

## 2020-03-08 RX ADMIN — LOSARTAN POTASSIUM 100 MG: 100 TABLET, FILM COATED ORAL at 08:04

## 2020-03-08 RX ADMIN — ATORVASTATIN CALCIUM 20 MG: 10 TABLET, FILM COATED ORAL at 08:04

## 2020-03-08 RX ADMIN — ESCITALOPRAM OXALATE 10 MG: 10 TABLET ORAL at 08:04

## 2020-03-08 RX ADMIN — HYDROCHLOROTHIAZIDE 25 MG: 25 TABLET ORAL at 08:04

## 2020-03-08 RX ADMIN — DEXAMETHASONE 4 MG: 4 TABLET ORAL at 14:14

## 2020-03-08 RX ADMIN — Medication 10 ML: at 08:06

## 2020-03-08 RX ADMIN — LEVOTHYROXINE SODIUM 75 MCG: 0.05 TABLET ORAL at 08:04

## 2020-03-08 RX ADMIN — DEXAMETHASONE SODIUM PHOSPHATE 4 MG: 4 INJECTION, SOLUTION INTRAMUSCULAR; INTRAVENOUS at 04:34

## 2020-03-08 ASSESSMENT — PAIN SCALES - GENERAL
PAINLEVEL_OUTOF10: 0
PAINLEVEL_OUTOF10: 7

## 2020-03-08 ASSESSMENT — PAIN DESCRIPTION - LOCATION: LOCATION: BACK;LEG;BUTTOCKS

## 2020-03-08 ASSESSMENT — PAIN DESCRIPTION - PAIN TYPE: TYPE: ACUTE PAIN

## 2020-03-08 ASSESSMENT — PAIN DESCRIPTION - ORIENTATION: ORIENTATION: LEFT

## 2020-03-08 NOTE — DISCHARGE SUMMARY
Alert and oriented, thought content appropriate, normal insight  Capillary Refill: Brisk,< 3 seconds   Peripheral Pulses: +2 palpable, equal bilaterally       Labs: For convenience and continuity at follow-up the following most recent labs are provided:      CBC:    Lab Results   Component Value Date    WBC 9.7 03/07/2020    HGB 14.1 03/07/2020    HCT 41.6 03/07/2020     03/07/2020       Renal:    Lab Results   Component Value Date     03/07/2020    K 3.9 03/07/2020    CL 99 03/07/2020    CO2 27 03/07/2020    BUN 29 03/07/2020    CREATININE 0.6 03/07/2020    CALCIUM 9.7 03/07/2020         Significant Diagnostic Studies    Radiology:   MRI Lumbar Spine WO Contrast   Final Result   Likely transitional vertebral anatomy. To be consistent with the prior   study, the last well-formed disc space is defined as L5-S1. Grade 1 L4 on L5 retrolisthesis, stable. Degenerative disc disease as described above, mildly progressed at L3-4. Spinal canal narrowing, mild at L3-4 and L4-5      Foraminal narrowing, moderate at left L4-5, mild at bilateral L5-S1, minimal   at right L4-5      Question of narrowing of the left lateral recess at L3-4, possibly contacting   the left descending L4 nerve root      Question of narrowing of the left lateral recess at L4-5, possibly contacting   the left descending L5 nerve root. CT LUMBAR SPINE WO CONTRAST   Final Result   1. Canal stenosis at L4-L5 and L5-S1 due to disc bulging combined with   ligamentum flava thickening and facet joint degenerative changes   2. No fracture or subluxation         CT PELVIS WO CONTRAST Additional Contrast? None   Final Result   Negative noncontrast CT of the pelvis.                 Consults:     IP CONSULT TO NEUROSURGERY    Disposition:  Home     Condition at Discharge: Stable    Discharge Instructions/Follow-up:  PCP, prn NS    Code Status:  Full Code     Activity: activity as tolerated    Diet: regular diet      Discharge

## 2020-03-08 NOTE — PROGRESS NOTES
back pain, Depression, HTN (hypertension), Hypothyroid, Macular degeneration, age related, and Sarcoidosis. has a past surgical history that includes Lung biopsy;  section; bronchoscopy (); shoulder surgery (Left, 2020); and Shoulder arthroscopy (Left, 2020). Restrictions  Restrictions/Precautions  Restrictions/Precautions: Fall Risk, Up as Tolerated  Required Braces or Orthoses?: Yes  Required Braces or Orthoses  Left Upper Extremity Brace/Splint: Sling  Position Activity Restriction  Other position/activity restrictions: up with assistance, Pt is s/p L RTC repair   Subjective   General  Response To Previous Treatment: Patient with no complaints from previous session. Family / Caregiver Present: No  Referring Practitioner: Ansley  Subjective  Subjective: Pt supine in bed on approach, pleasant and agreeable to PT tx  General Comment  Comments: RN cleared pt for session  Pain Screening  Patient Currently in Pain: Yes  Pain Assessment  Pain Assessment: 0-10  Pain Level: 7  Pain Type: Acute pain  Pain Location: Back;Leg;Buttocks  Pain Orientation: Left  Non-Pharmaceutical Pain Intervention(s): Ambulation/Increased Activity;Repositioned; Therapeutic presence  Vital Signs  Patient Currently in Pain: Yes       Orientation  Orientation  Overall Orientation Status: Within Normal Limits    Objective   Bed mobility  Supine to Sit: Modified independent(HOB flat)  Sit to Supine: Modified independent(HOB flat)     Transfers  Sit to Stand: Supervision  Stand to sit: Supervision  Comment: EOB no AD S     Ambulation  Ambulation?: Yes  Ambulation 1  Surface: level tile  Device: No Device  Assistance: Supervision  Quality of Gait: Decreased brody, wide LILIA, slight antalgic gait, B decreased step height/length  Gait Deviations: Slow Brody; Increased LILIA; Decreased step length;Decreased step height  Distance: 250'  Stairs/Curb  Stairs?: Yes  Stairs  # Steps : 18  Stairs Height: 6\"  Rails: Right

## 2020-03-08 NOTE — PROGRESS NOTES
Prescriptions called into [facility] and discharge instructions given. Pt verbalized understanding denies any questions/ needs at this time. IV removed with no complications. Took patient to front lobby in wheelchair for discharge.

## 2020-03-08 NOTE — PROGRESS NOTES
bowel sounds. Musculoskeletal: No clubbing, cyanosis or edema bilaterally. Full range of motion without deformity. Skin: Skin color, texture, turgor normal.  No rashes or lesions. Neurologic:  Neurovascularly intact without any focal sensory/motor deficits. Cranial nerves: II-XII intact, grossly non-focal.  Psychiatric: Alert and oriented, thought content appropriate, normal insight  Capillary Refill: Brisk,< 3 seconds   Peripheral Pulses: +2 palpable, equal bilaterally     I examined the patient today (03/08/20). Physical exam is same as yesterday (3/7)    Labs:   Recent Labs     03/06/20  1619 03/07/20  0712   WBC 11.4* 9.7   HGB 14.2 14.1   HCT 42.1 41.6    384     Recent Labs     03/06/20  1619 03/07/20 0712    136   K 3.7 3.9   CL 98* 99   CO2 26 27   BUN 25* 29*   CREATININE 0.8 0.6   CALCIUM 9.6 9.7     Recent Labs     03/07/20  0712   AST 15   ALT 15   BILITOT 0.3   ALKPHOS 122     No results for input(s): INR in the last 72 hours. No results for input(s): Patricio Vila in the last 72 hours. Urinalysis:    No results found for: Mike , BACTERIA, RBCUA, BLOODU, SPECGRAV, Mike [place] [address]    Radiology:  CT LUMBAR SPINE WO CONTRAST   Final Result   1. Canal stenosis at L4-L5 and L5-S1 due to disc bulging combined with   ligamentum flava thickening and facet joint degenerative changes   2. No fracture or subluxation         CT PELVIS WO CONTRAST Additional Contrast? None   Final Result   Negative noncontrast CT of the pelvis. MRI Lumbar Spine WO Contrast    (Results Pending)           Assessment/Plan:    Active Hospital Problems    Diagnosis    Degenerative lumbar spinal stenosis [M48.061]     PLAN:    Symptomatic spinal stenosis of lumbar spine  Admitted for IV steroids and MRI of the lumbar spine. Unfortunately, cannot have MRI because of a postop staple at the shoulder.  Nursing staff contacted orthopedic surgery to find out what type of staple it is   Pain is improving  PT/OT in process  Neurosurgery consulted. Not seen yet  I am switching steroids to po today, with intention to taper off slowly     Hypertension  Continue home antihypertensives.     Blood pressure is controlled     Hypothyroidism  Continue home dose of Synthroid  TSH is normal     Asthma  No signs of exacerbation  Continue home inhalers    D/w patient    DVT Prophylaxis: Lovenox  Diet: DIET GENERAL;  Code Status: Full Code    PT/OT Eval Status: inpatient    Dispo - inpatient pending neurosurgery opinion; probably home tomorrow if surgical procedure is not needed    Amber Dillon MD

## 2020-03-09 ENCOUNTER — HOSPITAL ENCOUNTER (OUTPATIENT)
Dept: PHYSICAL THERAPY | Age: 70
Setting detail: THERAPIES SERIES
Discharge: HOME | End: 2020-03-09
Payer: MEDICARE

## 2020-03-10 ENCOUNTER — TELEPHONE (OUTPATIENT)
Dept: FAMILY MEDICINE CLINIC | Age: 70
End: 2020-03-10

## 2020-03-12 ENCOUNTER — HOSPITAL ENCOUNTER (OUTPATIENT)
Dept: PHYSICAL THERAPY | Age: 70
Setting detail: THERAPIES SERIES
Discharge: HOME | End: 2020-03-12
Payer: MEDICARE

## 2020-03-12 PROCEDURE — 97110 THERAPEUTIC EXERCISES: CPT

## 2020-03-12 PROCEDURE — 97140 MANUAL THERAPY 1/> REGIONS: CPT

## 2020-03-12 PROCEDURE — 97016 VASOPNEUMATIC DEVICE THERAPY: CPT

## 2020-03-12 NOTE — FLOWSHEET NOTE
Patient will demonstrate increased AROM to [facility] to allow for proper joint functioning as indicated by patients Functional Deficits. [] Progressing: [] Met: [] Not Met: [] Adjusted     3. Patient will demonstrate an increase in Strength to 5/5 to allow for proper functional mobility as indicated by patients Functional Deficits. [] Progressing: [] Met: [] Not Met: [] Adjusted     4. Patient will return to full functional activities without increased symptoms or restriction including ability to reach overhead, wash hair, and reach behind back. [] Progressing: [] Met: [] Not Met: [] Adjusted     5. Patient will be able to return to PLOF including fishing and playing bingo without symptoms. [] Progressing: [] Met: [] Not Met: [] Adjusted        Overall Progression Towards Functional goals/ Treatment Progress Update:  [] Patient is progressing as expected towards functional goals listed. [] Progression is slowed due to complexities/Impairments listed. [] Progression has been slowed due to co-morbidities. [x] Plan just implemented, too soon to assess goals progression <30days   [] Goals require adjustment due to lack of progress  [] Patient is not progressing as expected and requires additional follow up with physician  [] Other    Prognosis for POC: [x] Good [] Fair  [] Poor      Patient requires continued skilled intervention: [x] Yes  [] No    Treatment/Activity Tolerance:  [x] Patient able to complete treatment  [] Patient limited by fatigue  [] Patient limited by pain    [] Patient limited by other medical complications  [] Other:       PLAN: See eval  [x] Continue per plan of care [] Alter current plan (see comments above)  [] Plan of care initiated [] Hold pending MD visit [] Discharge    Electronically signed by:  Hank Marroquin PT    Note: If patient does not return for scheduled/ recommended follow up visits, this note will serve as a discharge from care along with most recent update on progress.

## 2020-03-16 ENCOUNTER — HOSPITAL ENCOUNTER (OUTPATIENT)
Dept: PHYSICAL THERAPY | Age: 70
Setting detail: THERAPIES SERIES
Discharge: HOME | End: 2020-03-16
Payer: MEDICARE

## 2020-03-19 ENCOUNTER — APPOINTMENT (OUTPATIENT)
Dept: PHYSICAL THERAPY | Age: 70
End: 2020-03-19
Payer: MEDICARE

## 2020-03-23 ENCOUNTER — APPOINTMENT (OUTPATIENT)
Dept: PHYSICAL THERAPY | Age: 70
End: 2020-03-23
Payer: MEDICARE

## 2020-03-26 ENCOUNTER — APPOINTMENT (OUTPATIENT)
Dept: PHYSICAL THERAPY | Age: 70
End: 2020-03-26
Payer: MEDICARE

## 2020-03-30 RX ORDER — DEXAMETHASONE 4 MG/1
TABLET ORAL
Qty: 18 TABLET | Refills: 0 | Status: SHIPPED | OUTPATIENT
Start: 2020-03-30 | End: 2022-10-26

## 2020-07-06 ENCOUNTER — TELEPHONE (OUTPATIENT)
Dept: FAMILY MEDICINE CLINIC | Age: 70
End: 2020-07-06

## 2020-07-06 NOTE — TELEPHONE ENCOUNTER
[facility] received a call from:    Name of Caller: yi     Relationship to patient:self     Organization name:     Best contact number: [phone]    Reason for call: patient has an appt on 7/13 at 8:30am would like to see can come in at a later that  afternoon instead of morning for AW visit

## 2020-07-17 ENCOUNTER — OFFICE VISIT (OUTPATIENT)
Dept: FAMILY MEDICINE CLINIC | Age: 70
End: 2020-07-17
Payer: MEDICARE

## 2020-07-17 VITALS
SYSTOLIC BLOOD PRESSURE: 122 MMHG | WEIGHT: 181 LBS | RESPIRATION RATE: 14 BRPM | OXYGEN SATURATION: 97 % | HEART RATE: 68 BPM | HEIGHT: 63 IN | DIASTOLIC BLOOD PRESSURE: 70 MMHG | TEMPERATURE: 97.9 F | BODY MASS INDEX: 32.07 KG/M2

## 2020-07-17 LAB
A/G RATIO: 1.4 (ref 1.1–2.2)
ALBUMIN SERPL-MCNC: 4.2 G/DL (ref 3.4–5)
ALP BLD-CCNC: 103 U/L (ref 40–129)
ALT SERPL-CCNC: 19 U/L (ref 10–40)
ANION GAP SERPL CALCULATED.3IONS-SCNC: 13 MMOL/L (ref 3–16)
AST SERPL-CCNC: 19 U/L (ref 15–37)
BILIRUB SERPL-MCNC: 0.3 MG/DL (ref 0–1)
BUN BLDV-MCNC: 22 MG/DL (ref 7–20)
CALCIUM SERPL-MCNC: 9.8 MG/DL (ref 8.3–10.6)
CHLORIDE BLD-SCNC: 101 MMOL/L (ref 99–110)
CHOLESTEROL, TOTAL: 178 MG/DL (ref 0–199)
CO2: 27 MMOL/L (ref 21–32)
CREAT SERPL-MCNC: 0.7 MG/DL (ref 0.6–1.2)
GFR AFRICAN AMERICAN: >60
GFR NON-AFRICAN AMERICAN: >60
GLOBULIN: 2.9 G/DL
GLUCOSE BLD-MCNC: 106 MG/DL (ref 70–99)
HDLC SERPL-MCNC: 55 MG/DL (ref 40–60)
LDL CHOLESTEROL CALCULATED: 97 MG/DL
POTASSIUM SERPL-SCNC: 4.1 MMOL/L (ref 3.5–5.1)
SODIUM BLD-SCNC: 141 MMOL/L (ref 136–145)
TOTAL PROTEIN: 7.1 G/DL (ref 6.4–8.2)
TRIGL SERPL-MCNC: 129 MG/DL (ref 0–150)
VLDLC SERPL CALC-MCNC: 26 MG/DL

## 2020-07-17 PROCEDURE — G0439 PPPS, SUBSEQ VISIT: HCPCS | Performed by: FAMILY MEDICINE

## 2020-07-17 PROCEDURE — 3017F COLORECTAL CA SCREEN DOC REV: CPT | Performed by: FAMILY MEDICINE

## 2020-07-17 PROCEDURE — 1123F ACP DISCUSS/DSCN MKR DOCD: CPT | Performed by: FAMILY MEDICINE

## 2020-07-17 PROCEDURE — 4040F PNEUMOC VAC/ADMIN/RCVD: CPT | Performed by: FAMILY MEDICINE

## 2020-07-17 RX ORDER — LOSARTAN POTASSIUM AND HYDROCHLOROTHIAZIDE 25; 100 MG/1; MG/1
TABLET ORAL
Qty: 90 TABLET | Refills: 3 | Status: CANCELLED | OUTPATIENT
Start: 2020-07-17

## 2020-07-17 ASSESSMENT — LIFESTYLE VARIABLES: HOW OFTEN DO YOU HAVE A DRINK CONTAINING ALCOHOL: 0

## 2020-07-17 ASSESSMENT — PATIENT HEALTH QUESTIONNAIRE - PHQ9
SUM OF ALL RESPONSES TO PHQ QUESTIONS 1-9: 0
SUM OF ALL RESPONSES TO PHQ QUESTIONS 1-9: 0

## 2020-07-17 NOTE — PROGRESS NOTES
Medicare Annual Wellness Visit  Name: Jerzy Villalobos Date: 2020   MRN: <[id number]> Sex: Female   Age: 71 y.o. Ethnicity: Non-/Non    : 1950 Race: David Wong is here for Medicare AWV    Screenings for behavioral, psychosocial and functional/safety risks, and cognitive dysfunction are all negative except as indicated below. These results, as well as other patient data from the [address] form, are documented in Flowsheets linked to this Encounter. Allergies   Allergen Reactions    Codeine Other (See Comments)     Skin crawls       Prior to Visit Medications    Medication Sig Taking?  Authorizing Provider   dexamethasone (DECADRON) 4 MG tablet 1 tab tid x 3 days, then 1 tab bid x 3 days, then 1 tab q AM x 3 days and stop Yes Sotirradha Vieyra, DO   simvastatin (ZOCOR) 20 MG tablet TAKE 1 TABLET NIGHTLY Yes Safia Vieyra, DO   losartan-hydrochlorothiazide (HYZAAR) 100-25 MG per tablet TAKE 1 TABLET DAILY Yes Safia Vieyra, DO   montelukast (SINGULAIR) 10 MG tablet TAKE 1 TABLET NIGHTLY Yes Safia Vieyra, DO   escitalopram (LEXAPRO) 10 MG tablet TAKE 1 TABLET DAILY Yes Safia Vieyra, DO   levothyroxine (SYNTHROID) 75 MCG tablet TAKE 1 TABLET DAILY Yes Safia Vieyra, DO   albuterol sulfate  (90 Base) MCG/ACT inhaler Inhale 2 puffs into the lungs 4 times daily as needed for Wheezing Yes MILAD Dockery   Multiple Vitamins-Minerals (WOMENS 50+ MULTI VITAMIN/MIN) TABS Take by mouth daily Yes Historical Provider, MD   magnesium 30 MG tablet Take 30 mg by mouth 2 times daily Yes Historical Provider, MD   omeprazole (PRILOSEC) 20 MG delayed release capsule Take 20 mg by mouth daily Yes Historical Provider, MD   aspirin 81 MG tablet Take 81 mg by mouth daily Yes Historical Provider, MD   Misc Natural Products (FIBER 7 PO) Take by mouth Yes Historical Provider, MD   ibuprofen (ADVIL;MOTRIN) 600 MG tablet Take 1 tablet by mouth every 8 hours as needed for Pain  Radha Palomino DO       Past Medical History:   Diagnosis Date    Acromioclavicular joint arthritis 3/15/2018    Chronic back pain     Depression     w/Anxiety    HTN (hypertension)     Hypothyroid     Macular degeneration, age related     Sarcoidosis        Past Surgical History:   Procedure Laterality Date    BRONCHOSCOPY  1998     SECTION      LUNG BIOPSY      SHOULDER ARTHROSCOPY Left 2020    LEFT SHOULDER VIDEO ARTHROSCOPY, DISTAL CLAVICLE EXCISION, SUBACROMIAL DECOMPRESSION, ROTATOR CUFF REPAIR WITH ROTATION MEDICAL GRAFT performed by Radha Palomino DO at [facility] Left 2020    LEFT SHOULDER VIDEO ARTHROSCOPY, DISTAL CLAVICLE EXCISION, SUBACROMIAL DECOMPRESSION, ROTATOR CUFF REPAIR WITH ROTATION MEDICAL GRAFT       Family History   Problem Relation Age of Onset    Hypertension Mother     Diabetes Father     Cancer Son         testicular    Asthma Neg Hx     Emphysema Neg Hx     Heart Failure Neg Hx        CareTeam (Including outside providers/suppliers regularly involved in providing care):   Patient Care Team:  Arsen Cummings DO as PCP - General (Family Medicine)  Arsen Cummings DO as PCP - [facility] Empaneled Provider  Radha Palomino DO as Consulting Physician (Orthopedic Surgery)    Wt Readings from Last 3 Encounters:   20 181 lb (82.1 kg)   20 177 lb (80.3 kg)   20 181 lb 10.5 oz (82.4 kg)     Vitals:    20 0846   BP: 122/70   Pulse: 68   Resp: 14   Temp: 97.9 °F (36.6 °C)   SpO2: 97%   Weight: 181 lb (82.1 kg)   Height: 5' 3\" (1.6 m)     Body mass index is 32.06 kg/m². Based upon direct observation of the patient, evaluation of cognition reveals recent and remote memory intact. Patient's complete Health Risk Assessment and screening values have been reviewed and are found in Flowsheets.  The following problems were reviewed today and where indicated follow up appointments were made and/or referrals ordered. Positive Risk Factor Screenings with Interventions:     General Health:  General  In general, how would you say your health is?: Good  In the past 7 days, have you experienced any of the following? New or Increased Pain, New or Increased Fatigue, Loneliness, Social Isolation, Stress or Anger?: None of These  Do you get the social and emotional support that you need?: Yes  Do you have a Living Will?: (!) No  General Health Risk Interventions:  · No Living Will: provided the state-specific advance directive document to the patient    Health Habits/Nutrition:  Health Habits/Nutrition  Do you exercise for at least 20 minutes 2-3 times per week?: (!) No  Have you lost any weight without trying in the past 3 months?: No  Do you eat fewer than 2 meals per day?: No  Have you seen a dentist within the past year?: (!) No  Body mass index is 32.06 kg/m².   Health Habits/Nutrition Interventions:  · Inadequate physical activity:  educational materials provided to promote increased physical activity    Personalized Preventive Plan   Current Health Maintenance Status  Immunization History   Administered Date(s) Administered    Influenza, High Dose (Fluzone 65 yrs and older) 08/15/2017, 01/08/2019, 11/03/2019    Influenza, Triv, inactivated, subunit, adjuvanted, IM (Fluad 65 yrs and older) 09/12/2019    Pneumococcal Conjugate 13-valent ([id number]) 04/26/2017    Pneumococcal Polysaccharide ([id number]) 08/17/2018    Tdap (Boostrix, Adacel) 09/23/2017        Health Maintenance   Topic Date Due    Shingles Vaccine (1 of 2) 09/07/2000    Annual Wellness Visit (AWV)  05/29/2019    Diabetes screen  04/03/2020    Flu vaccine (1) 09/01/2020    Lipid screen  09/12/2020    TSH testing  03/06/2021    Potassium monitoring  03/07/2021    Creatinine monitoring  03/07/2021    Breast cancer screen  02/11/2022    DTaP/Tdap/Td vaccine (2 - Td) 09/23/2027    Colon cancer screen colonoscopy  03/17/2030    DEXA (modify frequency per FRAX score)  Completed    Pneumococcal 65+ years Vaccine  Completed    Hepatitis C screen  Completed    Hepatitis A vaccine  Aged Out    Hepatitis B vaccine  Aged Out    Hib vaccine  Aged Out    Meningococcal (ACWY) vaccine  Aged Out     Recommendations for Mems-ID Due: see orders and patient instructions/AVS.  . Recommended screening schedule for the next 5-10 years is provided to the patient in written form: see Patient Instructions/AVS.    Beto KURTZ LPN, [birth year], performed the documented evaluation under the direct supervision of the attending physician. This encounter was performed under Javi washington DOs, direct supervision, 7/17/2020.

## 2020-07-17 NOTE — PATIENT INSTRUCTIONS
diet is one that limits sodium , certain types of fat , and cholesterol . This type of diet is recommended for:   People with any form of cardiovascular disease (eg, coronary heart disease , peripheral vascular disease , previous heart attack , previous stroke )   People with risk factors for cardiovascular disease, such as high blood pressure , high cholesterol , or diabetes   Anyone who wants to lower their risk of developing cardiovascular disease   Sodium    Sodium is a mineral found in many foods. In general, most people consume much more sodium than they need. Diets high in sodium can increase blood pressure and lead to edema (water retention). On a heart-healthy diet, you should consume no more than 2,300 mg (milligrams) of sodium per dayabout the amount in one teaspoon of table salt. The foods highest in sodium include table salt (about 50% sodium), processed foods, convenience foods, and preserved foods. Cholesterol    Cholesterol is a fat-like, waxy substance in your blood. Our bodies make some cholesterol. It is also found in animal products, with the highest amounts in fatty meat, egg yolks, whole milk, cheese, shellfish, and organ meats. On a heart-healthy diet, you should limit your cholesterol intake to less than 200 mg per day. It is normal and important to have some cholesterol in your bloodstream. But too much cholesterol can cause plaque to build up within your arteries, which can eventually lead to a heart attack or stroke. The two types of cholesterol that are most commonly referred to are:   Low-density lipoprotein (LDL) cholesterol  Also known as bad cholesterol, this is the cholesterol that tends to build up along your arteries. Bad cholesterol levels are increased by eating fats that are saturated or hydrogenated. Optimal level of this cholesterol is less than 100. Over 130 starts to get risky for heart disease.    High-density lipoprotein (HDL) cholesterol  Also known as good cholesterol, this type of cholesterol actually carries cholesterol away from your arteries and may, therefore, help lower your risk of having a heart attack. You want this level to be high (ideally greater than 60). It is a risk to have a level less than 40. You can raise this good cholesterol by eating olive oil, canola oil, avocados, or nuts. Exercise raises this level, too. Fat    Fat is calorie dense and packs a lot of calories into a small amount of food. Even though fats should be limited due to their high calorie content, not all fats are bad. In fact, some fats are quite healthful. Fat can be broken down into four main types. The good-for-you fats are:   Monounsaturated fat  found in oils such as olive and canola, avocados, and nuts and natural nut butters; can decrease cholesterol levels, while keeping levels of HDL cholesterol high   Polyunsaturated fat  found in oils such as safflower, sunflower, soybean, corn, and sesame; can decrease total cholesterol and LDL cholesterol   Omega-3 fatty acids  particularly those found in fatty fish (such as salmon, trout, tuna, mackerel, herring, and sardines); can decrease risk of arrhythmias, decrease triglyceride levels, and slightly lower blood pressure   The fats that you want to limit are:   Saturated fat  found in animal products, many fast foods, and a few vegetables; increases total blood cholesterol, including LDL levels   Animal fats that are saturated include: butter, lard, whole-milk dairy products, meat fat, and poultry skin   Vegetable fats that are saturated include: hydrogenated shortening, palm oil, coconut oil, cocoa butter   Hydrogenated or trans fat  found in margarine and vegetable shortening, most shelf stable snack foods, and fried foods; increases LDL and decreases HDL     It is generally recommended that you limit your total fat for the day to less than 30% of your total calories.  If you follow an 1800-calorie heart healthy diet, for example, this would mean 60 grams of fat or less per day. Saturated fat and trans fat in your diet raises your blood cholesterol the most, much more than dietary cholesterol does. For this reason, on a heart-healthy diet, less than 7% of your calories should come from saturated fat and ideally 0% from trans fat. On an 1800-calorie diet, this translates into less than 14 grams of saturated fat per day, leaving 46 grams of fat to come from mono- and polyunsaturated fats.    Food Choices on a Heart Healthy Diet   Food Category   Foods Recommended   Foods to Avoid   Grains   Breads and rolls without salted tops Most dry and cooked cereals Unsalted crackers and breadsticks Low-sodium or homemade breadcrumbs or stuffing All rice and pastas   Breads, rolls, and crackers with salted tops High-fat baked goods (eg, muffins, donuts, pastries) Quick breads, self-rising flour, and biscuit mixes Regular bread crumbs Instant hot cereals Commercially prepared rice, pasta, or stuffing mixes   Vegetables   Most fresh, frozen, and low-sodium canned vegetables Low-sodium and salt-free vegetable juices Canned vegetables if unsalted or rinsed   Regular canned vegetables and juices, including sauerkraut and pickled vegetables Frozen vegetables with sauces Commercially prepared potato and vegetable mixes   Fruits   Most fresh, frozen, and canned fruits All fruit juices   Fruits processed with salt or sodium   Milk   Nonfat or low-fat (1%) milk Nonfat or low-fat yogurt Cottage cheese, low-fat ricotta, cheeses labeled as low-fat and low-sodium   Whole milk Reduced-fat (2%) milk Malted and chocolate milk Full fat yogurt Most cheeses (unless low-fat and low salt) Buttermilk (no more than 1 cup per week)   Meats and Beans   Lean cuts of fresh or frozen beef, veal, lamb, or pork (look for the word loin) Fresh or frozen poultry without the skin Fresh or frozen fish and some shellfish Egg whites and egg substitutes (Limit whole eggs to three per week) Tofu Nuts or seeds (unsalted, dry-roasted), low-sodium peanut butter Dried peas, beans, and lentils   Any smoked, cured, salted, or canned meat, fish, or poultry (including webb, chipped beef, cold cuts, hot dogs, sausages, sardines, and anchovies) Poultry skins Breaded and/or fried fish or meats Canned peas, beans, and lentils Salted nuts   Fats and Oils   Olive oil and canola oil Low-sodium, low-fat salad dressings and mayonnaise   Butter, margarine, coconut and palm oils, webb fat   Snacks, Sweets, and Condiments   Low-sodium or unsalted versions of broths, soups, soy sauce, and condiments Pepper, herbs, and spices; vinegar, lemon, or lime juice Low-fat frozen desserts (yogurt, sherbet, fruit bars) Sugar, cocoa powder, honey, syrup, jam, and preserves Low-fat, trans-fat free cookies, cakes, and pies Sea and animal crackers, fig bars, makeda snaps   High-fat desserts Broth, soups, gravies, and sauces, made from instant mixes or other high-sodium ingredients Salted snack foods Canned olives Meat tenderizers, seasoning salt, and most flavored vinegars   Beverages   Low-sodium carbonated beverages Tea and coffee in moderation Soy milk   Commercially softened water   Suggestions   Make whole grains, fruits, and vegetables the base of your diet. Choose heart-healthy fats such as canola, olive, and flaxseed oil, and foods high in heart-healthy fats, such as nuts, seeds, soybeans, tofu, and fish. Eat fish at least twice per week; the fish highest in omega-3 fatty acids and lowest in mercury include salmon, herring, mackerel, sardines, and canned chunk light tuna. If you eat fish less than twice per week or have high triglycerides, talk to your doctor about taking fish oil supplements. Read food labels.    For products low in fat and cholesterol, look for fat free, low-fat, cholesterol free, saturated fat free, and trans fat freeAlso scan the Nutrition Facts Label, which lists saturated fat, trans fat, and cholesterol amounts. For products low in sodium, look for sodium free, very low sodium, low sodium, no added salt, and unsalted   Skip the salt when cooking or at the table; if food needs more flavor, get creative and try out different herbs and spices. Garlic and onion also add substantial flavor to foods. Trim any visible fat off meat and poultry before cooking, and drain the fat off after wolf. Use cooking methods that require little or no added fat, such as grilling, boiling, baking, poaching, broiling, roasting, steaming, stir-frying, and sauting. Avoid fast food and convenience food. They tend to be high in saturated and trans fat and have a lot of added salt. Talk to a registered dietitian for individualized diet advice. Last Reviewed: March 2011 Mary Su MS, MPH, RD   Updated: 3/29/2011   ·     Keep Your Memory [place]       Many factors can affect your ability to remembera hectic lifestyle, aging, stress, chronic disease, and certain medicines. But, there are steps you can take to sharpen your mind and help preserve your memory. Challenge Your Brain   Regularly challenging your mind may help keeps it in top shape. Good mental exercises include:   Crossword puzzlesUse a dictionary if you need it; you will learn more that way. Brainteasers Try some! Crafts, such as wood working and sewing   Hobbies, such as gardening and building model airplanes   SocializingVisit old friends or join groups to meet new ones. Reading   Learning a new language   Taking a class, whether it be art history or rozina chi   TravelingExperience the food, history, and culture of your destination   Learning to use a computer   Going to museums, the theater, or thought-provoking movies   Changing things in your daily life, such as reversing your pattern in the grocery store or brushing your teeth using your nondominant hand   Use Memory Aids   There is no need to remember every detail on your own.  These memory aids can help:   Calendars and day planners   Electronic organizers to store all sorts of helpful informationThese devices can \"beep\" to remind you of appointments. A book of days to record birthdays, anniversaries, and other occasions that occur on the same date every year   Detailed \"to-do\" lists and strategically placed sticky notes   Quick \"study\" sessionsBefore a gathering, review who will be there so their names will be fresh in your mind. Establish routinesFor example, keep your keys, wallet, and umbrella in the same place all the time or take medicine with your 8:00 AM glass of juice   Live a Healthy Life   Many actions that will keep your body strong will do the same for your mind. For example:   Talk to Your Doctor About Herbs and Supplements    Malnutrition and vitamin deficiencies can impair your mental function. For example, vitamin B12 deficiency can cause a range of symptoms, including confusion. But, what if your nutritional needs are being met? Can herbs and supplements still offer a benefit? Researchers have investigated a range of natural remedies, such as ginkgo , ginseng , and the supplement phosphatidylserine (PS). So far, though, the evidence is inconsistent as to whether these products can improve memory or thinking. If you are interested in taking herbs and supplements, talk to your doctor first because they may interact with other medicines that you are taking. Exercise Regularly    Among the many benefits of regular exercise are increased blood flow to the brain and decreased risk of certain diseases that can interfere with memory function. One study found that even moderate exercise has a beneficial effect. Examples of \"moderate\" exercise include:   Playing 18 holes of golf once a week, without a cart   Playing tennis twice a week   Walking one mile per day   Manage Stress    It can be tough to remember what is important when your mind is cluttered.  Make time for will.  Some states may limit your right to refuse treatment in certain cases. For example, you may need to clearly state in your living will that you don't want artificial hydration and nutrition, such as being fed through a tube. Is a living will a legal document? A living will is a legal document. Each state has its own laws about living nash. And a living will may be called something else in your state. Here are some things to know about living nash. You don't need an  to complete a living will. But legal advice can be helpful if your state's laws are unclear. It can also help if your health history is complicated or your family can't agree on what should be in your living will. You can change your living will at any time. Some people find that their wishes about end-of-life care change as their health changes. If you make big changes to your living will, complete a new form. If you move to another state, make sure that your living will is legal in the state where you now live. In most cases, doctors will respect your wishes even if you have a form from a different state. You might use a universal form that has been approved by many states. This kind of form can sometimes be filled out and stored online. Your digital copy will then be available wherever you have a connection to the internet. The doctors and nurses who need to treat you can find it right away. Your state may offer an online registry. This is another place where you can store your living will online. It's a good idea to get your living will notarized. This means using a person called a  to watch two people sign, or witness, your living will. What should you know when you create a living will? Here are some questions to ask yourself as you make your living will:  Do you know enough about life support methods that might be used?  If not, talk to your doctor so you know what might be done if you can't breathe on your

## 2020-07-18 LAB
ESTIMATED AVERAGE GLUCOSE: 99.7 MG/DL
HBA1C MFR BLD: 5.1 %

## 2020-07-28 ENCOUNTER — OFFICE VISIT (OUTPATIENT)
Dept: PRIMARY CARE CLINIC | Age: 70
End: 2020-07-28
Payer: MEDICARE

## 2020-07-28 PROCEDURE — G8428 CUR MEDS NOT DOCUMENT: HCPCS | Performed by: NURSE PRACTITIONER

## 2020-07-28 PROCEDURE — G8417 CALC BMI ABV UP PARAM F/U: HCPCS | Performed by: NURSE PRACTITIONER

## 2020-07-28 PROCEDURE — 99211 OFF/OP EST MAY X REQ PHY/QHP: CPT | Performed by: NURSE PRACTITIONER

## 2020-07-28 NOTE — PROGRESS NOTES
Michelle Tillman received a viral test for COVID-19. They were educated on isolation and quarantine as appropriate. For any symptoms, they were directed to seek care from their PCP, given contact information to establish with a doctor, directed to an urgent care or the emergency room.

## 2020-07-29 LAB
SARS-COV-2: NOT DETECTED
SOURCE: NORMAL

## 2020-09-14 RX ORDER — LEVOTHYROXINE SODIUM 0.07 MG/1
TABLET ORAL
Qty: 90 TABLET | Refills: 3 | Status: SHIPPED | OUTPATIENT
Start: 2020-09-14

## 2020-09-14 NOTE — TELEPHONE ENCOUNTER
Refill Request     Last Seen: 7/17/2020    Last Written: 9/12/19    Next Appointment:   Future Appointments   Date Time Provider Juju León   7/23/2021  8:30 AM SCHEDULE, ISAI VAZQUEZ           Requested Prescriptions     Pending Prescriptions Disp Refills    levothyroxine (SYNTHROID) 75 MCG tablet [Pharmacy Med Name: L-THYROXINE TABS 75MCG] 90 tablet 3     Sig: TAKE 1 TABLET DAILY

## 2020-12-15 RX ORDER — SIMVASTATIN 20 MG
TABLET ORAL
Qty: 90 TABLET | Refills: 3 | Status: SHIPPED | OUTPATIENT
Start: 2020-12-15 | End: 2021-11-18

## 2020-12-15 NOTE — TELEPHONE ENCOUNTER
Refill Request     Last Seen: 7/17/2020    Last Written: 12/12/2019    Next Appointment:   Future Appointments   Date Time Provider Juju León   7/23/2021  8:30 AM SCHEDULE, ISAI VAZQUEZ             Requested Prescriptions     Pending Prescriptions Disp Refills    simvastatin (ZOCOR) 20 MG tablet [Pharmacy Med Name: SIMVASTATIN TABS 20MG] 90 tablet 3     Sig: TAKE 1 TABLET NIGHTLY

## 2021-01-14 RX ORDER — MONTELUKAST SODIUM 10 MG/1
TABLET ORAL
Qty: 90 TABLET | Refills: 3 | Status: SHIPPED | OUTPATIENT
Start: 2021-01-14 | End: 2022-01-10

## 2021-02-11 ENCOUNTER — TELEPHONE (OUTPATIENT)
Dept: FAMILY MEDICINE CLINIC | Age: 71
End: 2021-02-11

## 2021-07-16 ENCOUNTER — OFFICE VISIT (OUTPATIENT)
Dept: ORTHOPEDIC SURGERY | Age: 71
End: 2021-07-16
Payer: MEDICARE

## 2021-07-16 VITALS — WEIGHT: 177 LBS | BODY MASS INDEX: 32.57 KG/M2 | HEIGHT: 62 IN

## 2021-07-16 DIAGNOSIS — M25.521 RIGHT ELBOW PAIN: Primary | ICD-10-CM

## 2021-07-16 DIAGNOSIS — S42.444A CLOSED NONDISPLACED FRACTURE OF MEDIAL EPICONDYLE OF RIGHT HUMERUS, UNSPECIFIED FRACTURE MORPHOLOGY, INITIAL ENCOUNTER: ICD-10-CM

## 2021-07-16 PROCEDURE — G8417 CALC BMI ABV UP PARAM F/U: HCPCS | Performed by: ORTHOPAEDIC SURGERY

## 2021-07-16 PROCEDURE — 1090F PRES/ABSN URINE INCON ASSESS: CPT | Performed by: ORTHOPAEDIC SURGERY

## 2021-07-16 PROCEDURE — 1036F TOBACCO NON-USER: CPT | Performed by: ORTHOPAEDIC SURGERY

## 2021-07-16 PROCEDURE — 1123F ACP DISCUSS/DSCN MKR DOCD: CPT | Performed by: ORTHOPAEDIC SURGERY

## 2021-07-16 PROCEDURE — 4040F PNEUMOC VAC/ADMIN/RCVD: CPT | Performed by: ORTHOPAEDIC SURGERY

## 2021-07-16 PROCEDURE — G8427 DOCREV CUR MEDS BY ELIG CLIN: HCPCS | Performed by: ORTHOPAEDIC SURGERY

## 2021-07-16 PROCEDURE — G8399 PT W/DXA RESULTS DOCUMENT: HCPCS | Performed by: ORTHOPAEDIC SURGERY

## 2021-07-16 PROCEDURE — 3017F COLORECTAL CA SCREEN DOC REV: CPT | Performed by: ORTHOPAEDIC SURGERY

## 2021-07-16 PROCEDURE — 99213 OFFICE O/P EST LOW 20 MIN: CPT | Performed by: ORTHOPAEDIC SURGERY

## 2021-07-17 NOTE — PROGRESS NOTES
ORTHOPAEDIC SURGERY INITIAL EVALUATION NOTE  Chief Complaint   Patient presents with    New Patient     RIGHT ELBOW PAIN       HISTORY OF PRESENT ILLNESS:  75-year-old right-hand-dominant female presents for evaluation of right elbow pain today. She sustained an injury to her right elbow when she bumped it on a door frame. She indicates significant amount of force occurred to the elbow. She had immediate pain and swelling to the elbow. The swelling has resolved. She continues to have pain with use of the right upper extremity. It is a 5 out of 10 most times. She is unable to perform any heavy lifting. Her pain localizes to the posteromedial elbow and does not radiate. She notes some weakness into her hand and a slight tremor when attempting to lift objects. She denies paresthesias. Her pain has been ongoing for approximately 10 days, although she is unsure of the date of injury.     Past Medical History:   Diagnosis Date    Acromioclavicular joint arthritis 3/15/2018    Chronic back pain     Depression     w/Anxiety    HTN (hypertension)     Hypothyroid     Macular degeneration, age related     Sarcoidosis        Current Outpatient Medications   Medication Sig Dispense Refill    montelukast (SINGULAIR) 10 MG tablet TAKE 1 TABLET NIGHTLY 90 tablet 3    simvastatin (ZOCOR) 20 MG tablet TAKE 1 TABLET NIGHTLY 90 tablet 3    levothyroxine (SYNTHROID) 75 MCG tablet TAKE 1 TABLET DAILY 90 tablet 3    dexamethasone (DECADRON) 4 MG tablet 1 tab tid x 3 days, then 1 tab bid x 3 days, then 1 tab q AM x 3 days and stop 18 tablet 0    ibuprofen (ADVIL;MOTRIN) 600 MG tablet Take 1 tablet by mouth every 8 hours as needed for Pain 30 tablet 0    losartan-hydrochlorothiazide (HYZAAR) 100-25 MG per tablet TAKE 1 TABLET DAILY 90 tablet 3    escitalopram (LEXAPRO) 10 MG tablet TAKE 1 TABLET DAILY 90 tablet 3    albuterol sulfate  (90 Base) MCG/ACT inhaler Inhale 2 puffs into the lungs 4 times daily as About Running Out of Food in the Last Year:     David of Food in the Last Year:    Transportation Needs:     Lack of Transportation (Medical):  Lack of Transportation (Non-Medical):    Physical Activity:     Days of Exercise per Week:     Minutes of Exercise per Session:    Stress:     Feeling of Stress :    Social Connections:     Frequency of Communication with Friends and Family:     Frequency of Social Gatherings with Friends and Family:     Attends L[employer] Services:     Active Member of Clubs or Organizations:     Attends Club or Organization Meetings:     Marital Status:    Intimate Partner Violence:     Fear of Current or Ex-Partner:     Emotionally Abused:     Physically Abused:     Sexually Abused:        Review of Systems   Constitutional: Negative for chills and fever. Musculoskeletal: Positive for arthralgias and joint swelling. Skin: Negative for rash and wound. Neurological: Positive for tremors and weakness. Negative for numbness. PHYSICAL EXAM  Gen: awake, alert, oriented  HEENT: atraumatic, normocephalic  Neck: supple  Resp: equal chest rise bilaterally, no audible wheezes, no accessory muscle use. CV: Lower extremities warm and well perfused. Abd: soft, nontender   Focused examination of the right elbow:  There are no cuts, wounds, or abrasions overlying the right elbow. There is no obvious deformity. There is no appreciable swelling. There is no erythema or warmth. There is tenderness to palpation about the medial epicondyle, particularly the posterior aspect. There is no tenderness about the lateral epicondyle or olecranon. Elbow range of motion is full extension to 145 degrees of flexion without difficulty. There is no limitation to pronation or supination and no pain reproduced during elbow motion. Resisted manual muscle testing was not carried out today. . Sensation is intact to light touch in median, radial, ulnar, and axillary distributions.   Motor

## 2021-07-22 ENCOUNTER — TELEPHONE (OUTPATIENT)
Dept: FAMILY MEDICINE CLINIC | Age: 71
End: 2021-07-22

## 2021-07-30 ENCOUNTER — TELEPHONE (OUTPATIENT)
Dept: PULMONOLOGY | Age: 71
End: 2021-07-30

## 2021-07-30 NOTE — TELEPHONE ENCOUNTER
Received referral [facility] reestablish Sarcoidosis; patient having new symptoms chest discomfort requesting appt. Had recent ct chest at [facility]. Results scanned. 7/6/17    ASSESSMENT:  · Sarcoidosis - based on mediastinoscopy in 1998 at [address].    · SOB - not related to poor lung function or advancing pulmonary sarcoidosis  · Seasonal allergic rhinitis     PLAN:   · No signs of active disease on the chest ct and she has normal PFT results  · No therapy warranted at this time

## 2021-07-30 NOTE — TELEPHONE ENCOUNTER
Appt [facility] 8/3/21 @ 10:30am.  Patient aware. Requested Ct images to be pushed from [facility].

## 2021-08-03 ENCOUNTER — TELEPHONE (OUTPATIENT)
Dept: PULMONOLOGY | Age: 71
End: 2021-08-03

## 2021-08-03 ENCOUNTER — OFFICE VISIT (OUTPATIENT)
Dept: PULMONOLOGY | Age: 71
End: 2021-08-03
Payer: MEDICARE

## 2021-08-03 VITALS
BODY MASS INDEX: 34.08 KG/M2 | HEART RATE: 73 BPM | TEMPERATURE: 97.8 F | SYSTOLIC BLOOD PRESSURE: 132 MMHG | WEIGHT: 185.2 LBS | RESPIRATION RATE: 18 BRPM | DIASTOLIC BLOOD PRESSURE: 78 MMHG | OXYGEN SATURATION: 96 % | HEIGHT: 62 IN

## 2021-08-03 DIAGNOSIS — R06.02 SOB (SHORTNESS OF BREATH): Primary | ICD-10-CM

## 2021-08-03 DIAGNOSIS — D86.9 SARCOID: ICD-10-CM

## 2021-08-03 PROCEDURE — 3017F COLORECTAL CA SCREEN DOC REV: CPT | Performed by: INTERNAL MEDICINE

## 2021-08-03 PROCEDURE — G8417 CALC BMI ABV UP PARAM F/U: HCPCS | Performed by: INTERNAL MEDICINE

## 2021-08-03 PROCEDURE — 1123F ACP DISCUSS/DSCN MKR DOCD: CPT | Performed by: INTERNAL MEDICINE

## 2021-08-03 PROCEDURE — 1036F TOBACCO NON-USER: CPT | Performed by: INTERNAL MEDICINE

## 2021-08-03 PROCEDURE — 4040F PNEUMOC VAC/ADMIN/RCVD: CPT | Performed by: INTERNAL MEDICINE

## 2021-08-03 PROCEDURE — G8427 DOCREV CUR MEDS BY ELIG CLIN: HCPCS | Performed by: INTERNAL MEDICINE

## 2021-08-03 PROCEDURE — 99204 OFFICE O/P NEW MOD 45 MIN: CPT | Performed by: INTERNAL MEDICINE

## 2021-08-03 PROCEDURE — 1090F PRES/ABSN URINE INCON ASSESS: CPT | Performed by: INTERNAL MEDICINE

## 2021-08-03 PROCEDURE — G8399 PT W/DXA RESULTS DOCUMENT: HCPCS | Performed by: INTERNAL MEDICINE

## 2021-08-03 RX ORDER — AZITHROMYCIN 250 MG/1
250 TABLET, FILM COATED ORAL SEE ADMIN INSTRUCTIONS
Qty: 6 TABLET | Refills: 0 | Status: SHIPPED | OUTPATIENT
Start: 2021-08-03 | End: 2021-08-08

## 2021-08-03 NOTE — TELEPHONE ENCOUNTER
Need to get comparison on Ct chest done 7/27/21 with last ct chest done [facility] 6/23/17. Called [facility] spoke with Eunice she is aware patient is bring disc from [facility] to have comparison completed. Watch for results.

## 2021-08-03 NOTE — PROGRESS NOTES
Chief Complaint: SOB    Consulting provider: GAURI Pineda *    HPI: 79 y.o. female patient is being seen at the request of GAURI Pienda * for a consultation regarding SOB. The patient does not have SOB at rest but has LAU. Exercise tolerance on level ground is not limited but she does get out of breath. C/o bilateral mid-back pain from activity - not currently bothersome. Feels feverish but has normal temps at home. No cough or wheeze. She does have post-nasal gtt. Never smoker  Environmental/chemical exposure: Asbestos exposure: no  TB exposure: no    The information above included the review and summarization of old records. The history was obtained from these old records and from the patient directly. Outside information from the referring provider regarding the chief complaint was reviewed. REVIEW OF SYSTEMS:    CONSTITUTIONAL: Also see HPI. Negative for fevers and chills and sweats. No unintentional weight loss more than 5 lbs. EYES: Also see HPI. no conjunctival injection or drainage. No itching or pain or tearing. ENT: Also see HPI. No post nasal drip, sinus pressure, nasal congestion/rhinorrhea, ear aches, snoring  RESPIRATORY:  Also see HPI. No hemoptysis or pleuritic pain. CARDIOVASCULAR: Negative for chest pain, palpitations, PND, orthopnea  GASTROINTESTINAL: Negative for acid reflux or heart burn. Negative for nausea, vomiting, difficulty swallowing, diarrhea, constipation and abdominal pain  MUSCULOSKELETAL:  No neck or back pain or arthritis. No arthralgias. No muscle weakness. HEMATOLOGICAL/LYMPH: Negative for adenopathy  SKIN:  No rash or nodules  EXTREMITIES: Negative for cyanosis or edema or raynaud's symptoms   NEUROLOGICAL: No Anxiety or depression. Negative for Syncope. Negative for seizures.      Past Medical History:   Diagnosis Date    Acromioclavicular joint arthritis 3/15/2018    Chronic back pain     Depression     w/Anxiety    HTN (hypertension)     Hypothyroid     Macular degeneration, age related     Sarcoidosis      Past Surgical History      Procedure Laterality Date    BRONCHOSCOPY       SECTION      LUNG BIOPSY      SHOULDER ARTHROSCOPY Left 2020    LEFT SHOULDER VIDEO ARTHROSCOPY, DISTAL CLAVICLE EXCISION, SUBACROMIAL DECOMPRESSION, ROTATOR CUFF REPAIR WITH ROTATION MEDICAL GRAFT performed by Ronnie Feliciano DO at [facility] Left 2020    LEFT SHOULDER VIDEO ARTHROSCOPY, DISTAL CLAVICLE EXCISION, SUBACROMIAL DECOMPRESSION, ROTATOR CUFF REPAIR WITH ROTATION MEDICAL GRAFT     Social History:    TOBACCO:   reports that she has never smoked. She has never used smokeless tobacco.  ETOH:   reports no history of alcohol use. Family History      Problem Relation Age of Onset    Hypertension Mother     Diabetes Father     Cancer Son         testicular    Asthma Neg Hx     Emphysema Neg Hx     Heart Failure Neg Hx      Allergies:  is allergic to codeine.       Current Outpatient Medications:     montelukast (SINGULAIR) 10 MG tablet, TAKE 1 TABLET NIGHTLY, Disp: 90 tablet, Rfl: 3    simvastatin (ZOCOR) 20 MG tablet, TAKE 1 TABLET NIGHTLY, Disp: 90 tablet, Rfl: 3    levothyroxine (SYNTHROID) 75 MCG tablet, TAKE 1 TABLET DAILY, Disp: 90 tablet, Rfl: 3    dexamethasone (DECADRON) 4 MG tablet, 1 tab tid x 3 days, then 1 tab bid x 3 days, then 1 tab q AM x 3 days and stop, Disp: 18 tablet, Rfl: 0    losartan-hydrochlorothiazide (HYZAAR) 100-25 MG per tablet, TAKE 1 TABLET DAILY, Disp: 90 tablet, Rfl: 3    escitalopram (LEXAPRO) 10 MG tablet, TAKE 1 TABLET DAILY, Disp: 90 tablet, Rfl: 3    albuterol sulfate  (90 Base) MCG/ACT inhaler, Inhale 2 puffs into the lungs 4 times daily as needed for Wheezing, Disp: 3 Inhaler, Rfl: 1    Multiple Vitamins-Minerals (WOMENS 50+ MULTI VITAMIN/MIN) TABS, Take by mouth daily, Disp: , Rfl:     omeprazole (PRILOSEC) 20 MG delayed release capsule, Take 20 mg by mouth daily, Disp: , Rfl:     aspirin 81 MG tablet, Take 81 mg by mouth daily, Disp: , Rfl:     Misc Natural Products (FIBER 7 PO), Take by mouth, Disp: , Rfl:     ibuprofen (ADVIL;MOTRIN) 600 MG tablet, Take 1 tablet by mouth every 8 hours as needed for Pain, Disp: 30 tablet, Rfl: 0    magnesium 30 MG tablet, Take 30 mg by mouth 2 times daily (Patient not taking: Reported on 8/3/2021), Disp: , Rfl:     Objective:   PHYSICAL EXAM:   /78   Pulse 73   Temp 97.8 °F (36.6 °C)   Resp 18   Ht 5' 2\" (1.575 m)   Wt 185 lb 3.2 oz (84 kg)   LMP  (LMP Unknown)   SpO2 96% Comment: with RA  BMI 33.87 kg/m²    Constitutional:  No acute distress. Eyes: PERRL. Conjunctivae anicteric. ENT: Normal nose. Normal tongue. Oropharynx is clear. Neck:  Trachea is midline. No thyroid tenderness. Respiratory: No accessory muscle usage. No decreased breath sounds. No wheezes. No rales. No Rhonchi. Cardiovascular: Normal S1S2. No digit clubbing. No digit cyanosis. No LE edema. Lymphatic: No cervical or supraclavicular adenopathy. Skin: No rash on the exposed extremities. No Nodules or induration on exposed extremities. Psychiatric: No anxiety or Agitation. Alert and Oriented to person, place and time. LABS:  The recent relevant labs were reviewed    3/19/10 Chest CT:  CT with IV contrast is compared to 8/27/2009 and 6/18/2008. The mediastinal        structures are normal without lymphadenopathy. Scattered subcentimeter        pulmonary nodules are again stable. No new pulmonary nodules or infiltrate.        No underlying interstitial lung disease.  Upper abdomen is unremarkable.        Degenerative changes in the spine.            IMPRESSION:            Stable subcentimeter pulmonary nodules.      6/23/17 chest CT with contrast:  Stable tiny nodular densities dating back to 2010.       Fatty liver and cholelithiasis       7/27/21 Chest CT at [facility]:  Reviewed but there was not a comparison available to the prior Chest CT    ASSESSMENT:  · SOB -increased with activity for 2 weeks   · Sarcoidosis - based on mediastinoscopy in 1998 at [address].    · Seasonal allergic rhinitis     PLAN:   · Aileen Skeeters for possible infection noted on CT scan  · Compare recent [facility] at [facility] to prior CT from 2017 to determine if the nodular areas are stable or changed from 4 years ago  · Further plan after comparison of CTs

## 2022-01-10 RX ORDER — MONTELUKAST SODIUM 10 MG/1
TABLET ORAL
Qty: 90 TABLET | Refills: 3 | Status: SHIPPED | OUTPATIENT
Start: 2022-01-10

## 2022-01-10 NOTE — TELEPHONE ENCOUNTER
Refill Request     Last Seen: Last Seen Department: 7/17/2020  Last Seen by PCP: 7/17/2020    Last Written: 1/14/21 90 tablet 3 refill     Next Appointment:     Message to [facility] to schedule appointment.          Requested Prescriptions     Pending Prescriptions Disp Refills    montelukast (SINGULAIR) 10 MG tablet [Pharmacy Med Name: MONTELUKAST SODIUM TABS 10MG] 90 tablet 3     Sig: TAKE 1 TABLET NIGHTLY

## 2022-09-21 ENCOUNTER — HOSPITAL ENCOUNTER (OUTPATIENT)
Age: 72
Discharge: HOME | End: 2022-09-21
Payer: MEDICARE

## 2022-09-21 ENCOUNTER — HOSPITAL ENCOUNTER (OUTPATIENT)
Dept: GENERAL RADIOLOGY | Age: 72
Discharge: HOME | End: 2022-09-21
Payer: MEDICARE

## 2022-09-21 DIAGNOSIS — R06.02 SHORTNESS OF BREATH: ICD-10-CM

## 2022-09-21 PROCEDURE — 71046 X-RAY EXAM CHEST 2 VIEWS: CPT

## 2022-10-18 ENCOUNTER — HOSPITAL ENCOUNTER (OUTPATIENT)
Age: 72
Discharge: HOME | End: 2022-10-18
Payer: MEDICARE

## 2022-10-18 ENCOUNTER — HOSPITAL ENCOUNTER (OUTPATIENT)
Dept: CT IMAGING | Age: 72
Discharge: HOME | End: 2022-10-18
Payer: MEDICARE

## 2022-10-18 DIAGNOSIS — R10.9 ABDOMINAL CRAMPING: ICD-10-CM

## 2022-10-18 LAB
CREAT SERPL-MCNC: 0.8 MG/DL (ref 0.6–1.2)
GFR SERPL CREATININE-BSD FRML MDRD: >60 ML/MIN/{1.73_M2}

## 2022-10-18 PROCEDURE — 82565 ASSAY OF CREATININE: CPT

## 2022-10-18 PROCEDURE — 36415 COLL VENOUS BLD VENIPUNCTURE: CPT

## 2022-10-18 PROCEDURE — 6360000004 HC RX CONTRAST MEDICATION: Performed by: NURSE PRACTITIONER

## 2022-10-18 PROCEDURE — 74160 CT ABDOMEN W/CONTRAST: CPT

## 2022-10-18 RX ADMIN — DIATRIZOATE MEGLUMINE AND DIATRIZOATE SODIUM 12 ML: 660; 100 LIQUID ORAL; RECTAL at 15:19

## 2022-10-18 RX ADMIN — IOPAMIDOL 75 ML: 755 INJECTION, SOLUTION INTRAVENOUS at 15:04

## 2022-10-26 ENCOUNTER — APPOINTMENT (OUTPATIENT)
Dept: GENERAL RADIOLOGY | Age: 72
DRG: 872 | End: 2022-10-26
Payer: MEDICARE

## 2022-10-26 ENCOUNTER — APPOINTMENT (OUTPATIENT)
Dept: CT IMAGING | Age: 72
DRG: 872 | End: 2022-10-26
Payer: MEDICARE

## 2022-10-26 ENCOUNTER — HOSPITAL ENCOUNTER (INPATIENT)
Age: 72
LOS: 3 days | Discharge: HOME | DRG: 872 | End: 2022-10-29
Attending: STUDENT IN AN ORGANIZED HEALTH CARE EDUCATION/TRAINING PROGRAM | Admitting: INTERNAL MEDICINE
Payer: MEDICARE

## 2022-10-26 DIAGNOSIS — E83.42 HYPOMAGNESEMIA: ICD-10-CM

## 2022-10-26 DIAGNOSIS — R19.5 POSITIVE OCCULT STOOL BLOOD TEST: ICD-10-CM

## 2022-10-26 DIAGNOSIS — D72.829 LEUKOCYTOSIS, UNSPECIFIED TYPE: ICD-10-CM

## 2022-10-26 DIAGNOSIS — K52.9 PROCTOCOLITIS: Primary | ICD-10-CM

## 2022-10-26 DIAGNOSIS — E87.6 HYPOKALEMIA: ICD-10-CM

## 2022-10-26 DIAGNOSIS — A09 INFECTIOUS DIARRHEA: ICD-10-CM

## 2022-10-26 LAB
A/G RATIO: 1.1 (ref 1.1–2.2)
ALBUMIN SERPL-MCNC: 4 G/DL (ref 3.4–5)
ALP BLD-CCNC: 95 U/L (ref 40–129)
ALT SERPL-CCNC: 32 U/L (ref 10–40)
ANION GAP SERPL CALCULATED.3IONS-SCNC: 13 MMOL/L (ref 3–16)
ANISOCYTOSIS: ABNORMAL
AST SERPL-CCNC: 23 U/L (ref 15–37)
ATYPICAL LYMPHOCYTE RELATIVE PERCENT: 1 % (ref 0–6)
BANDED NEUTROPHILS RELATIVE PERCENT: 1 % (ref 0–7)
BASOPHILS ABSOLUTE: 0.2 K/UL (ref 0–0.2)
BASOPHILS RELATIVE PERCENT: 1 %
BILIRUB SERPL-MCNC: 0.3 MG/DL (ref 0–1)
BILIRUBIN URINE: NEGATIVE
BLOOD, URINE: NEGATIVE
BUN BLDV-MCNC: 15 MG/DL (ref 7–20)
C DIFF TOXIN/ANTIGEN: NORMAL
CALCIUM SERPL-MCNC: 10.2 MG/DL (ref 8.3–10.6)
CHLORIDE BLD-SCNC: 93 MMOL/L (ref 99–110)
CLARITY: CLEAR
CO2: 30 MMOL/L (ref 21–32)
COLOR: YELLOW
CREAT SERPL-MCNC: 1 MG/DL (ref 0.6–1.2)
EKG ATRIAL RATE: 80 BPM
EKG DIAGNOSIS: NORMAL
EKG P AXIS: 51 DEGREES
EKG P-R INTERVAL: 134 MS
EKG Q-T INTERVAL: 396 MS
EKG QRS DURATION: 100 MS
EKG QTC CALCULATION (BAZETT): 456 MS
EKG R AXIS: -27 DEGREES
EKG T AXIS: 22 DEGREES
EKG VENTRICULAR RATE: 80 BPM
EOSINOPHILS ABSOLUTE: 0 K/UL (ref 0–0.6)
EOSINOPHILS RELATIVE PERCENT: 0 %
GFR SERPL CREATININE-BSD FRML MDRD: 60 ML/MIN/{1.73_M2}
GLUCOSE BLD-MCNC: 122 MG/DL (ref 70–99)
GLUCOSE URINE: NEGATIVE MG/DL
HCT VFR BLD CALC: 40.7 % (ref 36–48)
HEMATOLOGY PATH CONSULT: NO
HEMOGLOBIN: 14.1 G/DL (ref 12–16)
INFLUENZA A: NOT DETECTED
INFLUENZA B: NOT DETECTED
KETONES, URINE: NEGATIVE MG/DL
LACTIC ACID, SEPSIS: 0.8 MMOL/L (ref 0.4–1.9)
LEUKOCYTE ESTERASE, URINE: NEGATIVE
LIPASE: 41 U/L (ref 13–60)
LYMPHOCYTES ABSOLUTE: 2.4 K/UL (ref 1–5.1)
LYMPHOCYTES RELATIVE PERCENT: 12 %
MACROCYTES: ABNORMAL
MAGNESIUM: 1.5 MG/DL (ref 1.8–2.4)
MCH RBC QN AUTO: 32.5 PG (ref 26–34)
MCHC RBC AUTO-ENTMCNC: 34.6 G/DL (ref 31–36)
MCV RBC AUTO: 93.8 FL (ref 80–100)
METAMYELOCYTES RELATIVE PERCENT: 1 %
MICROSCOPIC EXAMINATION: NORMAL
MONOCYTES ABSOLUTE: 1.5 K/UL (ref 0–1.3)
MONOCYTES RELATIVE PERCENT: 8 %
NEUTROPHILS ABSOLUTE: 14.7 K/UL (ref 1.7–7.7)
NEUTROPHILS RELATIVE PERCENT: 76 %
NITRITE, URINE: NEGATIVE
OCCULT BLOOD DIAGNOSTIC: ABNORMAL
PDW BLD-RTO: 13.9 % (ref 12.4–15.4)
PH UA: 6 (ref 5–8)
PLATELET # BLD: 468 K/UL (ref 135–450)
PLATELET SLIDE REVIEW: ADEQUATE
PMV BLD AUTO: 6.7 FL (ref 5–10.5)
POTASSIUM REFLEX MAGNESIUM: 3.1 MMOL/L (ref 3.5–5.1)
PROCALCITONIN: 0.07 NG/ML (ref 0–0.15)
PROTEIN UA: NEGATIVE MG/DL
RBC # BLD: 4.33 M/UL (ref 4–5.2)
SARS-COV-2 RNA, RT PCR: NOT DETECTED
SLIDE REVIEW: ABNORMAL
SODIUM BLD-SCNC: 136 MMOL/L (ref 136–145)
SPECIFIC GRAVITY UA: 1.01 (ref 1–1.03)
TOTAL PROTEIN: 7.7 G/DL (ref 6.4–8.2)
TOXIC GRANULATION: PRESENT
TROPONIN: <0.01 NG/ML
TSH REFLEX: 5.17 UIU/ML (ref 0.27–4.2)
URINE REFLEX TO CULTURE: NORMAL
URINE TYPE: NORMAL
UROBILINOGEN, URINE: 0.2 E.U./DL
VACUOLATED NEUTROPHILS: PRESENT
WBC # BLD: 18.8 K/UL (ref 4–11)

## 2022-10-26 PROCEDURE — 83690 ASSAY OF LIPASE: CPT

## 2022-10-26 PROCEDURE — 36415 COLL VENOUS BLD VENIPUNCTURE: CPT

## 2022-10-26 PROCEDURE — 71045 X-RAY EXAM CHEST 1 VIEW: CPT

## 2022-10-26 PROCEDURE — 87506 IADNA-DNA/RNA PROBE TQ 6-11: CPT

## 2022-10-26 PROCEDURE — 96365 THER/PROPH/DIAG IV INF INIT: CPT

## 2022-10-26 PROCEDURE — 6360000002 HC RX W HCPCS: Performed by: INTERNAL MEDICINE

## 2022-10-26 PROCEDURE — 6360000002 HC RX W HCPCS: Performed by: PHYSICIAN ASSISTANT

## 2022-10-26 PROCEDURE — 6370000000 HC RX 637 (ALT 250 FOR IP): Performed by: INTERNAL MEDICINE

## 2022-10-26 PROCEDURE — 6360000004 HC RX CONTRAST MEDICATION: Performed by: PHYSICIAN ASSISTANT

## 2022-10-26 PROCEDURE — 1200000000 HC SEMI PRIVATE

## 2022-10-26 PROCEDURE — 74177 CT ABD & PELVIS W/CONTRAST: CPT

## 2022-10-26 PROCEDURE — 80053 COMPREHEN METABOLIC PANEL: CPT

## 2022-10-26 PROCEDURE — 96366 THER/PROPH/DIAG IV INF ADDON: CPT

## 2022-10-26 PROCEDURE — 6370000000 HC RX 637 (ALT 250 FOR IP): Performed by: PHYSICIAN ASSISTANT

## 2022-10-26 PROCEDURE — 81003 URINALYSIS AUTO W/O SCOPE: CPT

## 2022-10-26 PROCEDURE — 2580000003 HC RX 258: Performed by: INTERNAL MEDICINE

## 2022-10-26 PROCEDURE — C9113 INJ PANTOPRAZOLE SODIUM, VIA: HCPCS | Performed by: PHYSICIAN ASSISTANT

## 2022-10-26 PROCEDURE — 6360000002 HC RX W HCPCS: Performed by: STUDENT IN AN ORGANIZED HEALTH CARE EDUCATION/TRAINING PROGRAM

## 2022-10-26 PROCEDURE — 84439 ASSAY OF FREE THYROXINE: CPT

## 2022-10-26 PROCEDURE — 87324 CLOSTRIDIUM AG IA: CPT

## 2022-10-26 PROCEDURE — 83605 ASSAY OF LACTIC ACID: CPT

## 2022-10-26 PROCEDURE — 87040 BLOOD CULTURE FOR BACTERIA: CPT

## 2022-10-26 PROCEDURE — 84484 ASSAY OF TROPONIN QUANT: CPT

## 2022-10-26 PROCEDURE — 87636 SARSCOV2 & INF A&B AMP PRB: CPT

## 2022-10-26 PROCEDURE — 87328 CRYPTOSPORIDIUM AG IA: CPT

## 2022-10-26 PROCEDURE — 84145 PROCALCITONIN (PCT): CPT

## 2022-10-26 PROCEDURE — 85025 COMPLETE CBC W/AUTO DIFF WBC: CPT

## 2022-10-26 PROCEDURE — 93005 ELECTROCARDIOGRAM TRACING: CPT | Performed by: PHYSICIAN ASSISTANT

## 2022-10-26 PROCEDURE — 87449 NOS EACH ORGANISM AG IA: CPT

## 2022-10-26 PROCEDURE — 93010 ELECTROCARDIOGRAM REPORT: CPT | Performed by: INTERNAL MEDICINE

## 2022-10-26 PROCEDURE — 6370000000 HC RX 637 (ALT 250 FOR IP): Performed by: STUDENT IN AN ORGANIZED HEALTH CARE EDUCATION/TRAINING PROGRAM

## 2022-10-26 PROCEDURE — 96375 TX/PRO/DX INJ NEW DRUG ADDON: CPT

## 2022-10-26 PROCEDURE — 87336 ENTAMOEB HIST DISPR AG IA: CPT

## 2022-10-26 PROCEDURE — 99285 EMERGENCY DEPT VISIT HI MDM: CPT

## 2022-10-26 PROCEDURE — 2580000003 HC RX 258: Performed by: PHYSICIAN ASSISTANT

## 2022-10-26 PROCEDURE — 82270 OCCULT BLOOD FECES: CPT

## 2022-10-26 PROCEDURE — 83735 ASSAY OF MAGNESIUM: CPT

## 2022-10-26 PROCEDURE — 87493 C DIFF AMPLIFIED PROBE: CPT

## 2022-10-26 PROCEDURE — 96361 HYDRATE IV INFUSION ADD-ON: CPT

## 2022-10-26 PROCEDURE — 84443 ASSAY THYROID STIM HORMONE: CPT

## 2022-10-26 RX ORDER — ONDANSETRON 2 MG/ML
4 INJECTION INTRAMUSCULAR; INTRAVENOUS EVERY 6 HOURS PRN
Status: DISCONTINUED | OUTPATIENT
Start: 2022-10-26 | End: 2022-10-29 | Stop reason: HOSPADM

## 2022-10-26 RX ORDER — CIPROFLOXACIN 2 MG/ML
400 INJECTION, SOLUTION INTRAVENOUS ONCE
Status: COMPLETED | OUTPATIENT
Start: 2022-10-26 | End: 2022-10-26

## 2022-10-26 RX ORDER — FUROSEMIDE 20 MG/1
TABLET ORAL
Status: ON HOLD | COMMUNITY
Start: 2022-10-17 | End: 2022-10-29 | Stop reason: HOSPADM

## 2022-10-26 RX ORDER — SACCHAROMYCES BOULARDII 250 MG
250 CAPSULE ORAL 2 TIMES DAILY
Status: DISCONTINUED | OUTPATIENT
Start: 2022-10-26 | End: 2022-10-29 | Stop reason: HOSPADM

## 2022-10-26 RX ORDER — POTASSIUM CHLORIDE 20 MEQ/1
40 TABLET, EXTENDED RELEASE ORAL ONCE
Status: COMPLETED | OUTPATIENT
Start: 2022-10-26 | End: 2022-10-26

## 2022-10-26 RX ORDER — POTASSIUM CHLORIDE 20 MEQ/1
40 TABLET, EXTENDED RELEASE ORAL PRN
Status: DISCONTINUED | OUTPATIENT
Start: 2022-10-26 | End: 2022-10-29 | Stop reason: HOSPADM

## 2022-10-26 RX ORDER — ACETAMINOPHEN 650 MG/1
650 SUPPOSITORY RECTAL EVERY 6 HOURS PRN
Status: DISCONTINUED | OUTPATIENT
Start: 2022-10-26 | End: 2022-10-29 | Stop reason: HOSPADM

## 2022-10-26 RX ORDER — ACETAMINOPHEN 325 MG/1
650 TABLET ORAL EVERY 6 HOURS PRN
Status: DISCONTINUED | OUTPATIENT
Start: 2022-10-26 | End: 2022-10-29 | Stop reason: HOSPADM

## 2022-10-26 RX ORDER — MORPHINE SULFATE 2 MG/ML
2 INJECTION, SOLUTION INTRAMUSCULAR; INTRAVENOUS
Status: DISCONTINUED | OUTPATIENT
Start: 2022-10-26 | End: 2022-10-29 | Stop reason: HOSPADM

## 2022-10-26 RX ORDER — SODIUM CHLORIDE 0.9 % (FLUSH) 0.9 %
5-40 SYRINGE (ML) INJECTION PRN
Status: DISCONTINUED | OUTPATIENT
Start: 2022-10-26 | End: 2022-10-29 | Stop reason: HOSPADM

## 2022-10-26 RX ORDER — PANTOPRAZOLE SODIUM 40 MG/10ML
40 INJECTION, POWDER, LYOPHILIZED, FOR SOLUTION INTRAVENOUS ONCE
Status: COMPLETED | OUTPATIENT
Start: 2022-10-26 | End: 2022-10-26

## 2022-10-26 RX ORDER — MAGNESIUM SULFATE 1 G/100ML
1000 INJECTION INTRAVENOUS PRN
Status: DISCONTINUED | OUTPATIENT
Start: 2022-10-26 | End: 2022-10-29 | Stop reason: HOSPADM

## 2022-10-26 RX ORDER — SODIUM CHLORIDE 0.9 % (FLUSH) 0.9 %
5-40 SYRINGE (ML) INJECTION EVERY 12 HOURS SCHEDULED
Status: DISCONTINUED | OUTPATIENT
Start: 2022-10-26 | End: 2022-10-29 | Stop reason: HOSPADM

## 2022-10-26 RX ORDER — 0.9 % SODIUM CHLORIDE 0.9 %
1000 INTRAVENOUS SOLUTION INTRAVENOUS ONCE
Status: COMPLETED | OUTPATIENT
Start: 2022-10-26 | End: 2022-10-26

## 2022-10-26 RX ORDER — MAGNESIUM SULFATE IN WATER 40 MG/ML
2000 INJECTION, SOLUTION INTRAVENOUS ONCE
Status: COMPLETED | OUTPATIENT
Start: 2022-10-26 | End: 2022-10-26

## 2022-10-26 RX ORDER — LEVOTHYROXINE SODIUM 0.03 MG/1
75 TABLET ORAL DAILY
Status: DISCONTINUED | OUTPATIENT
Start: 2022-10-27 | End: 2022-10-29 | Stop reason: HOSPADM

## 2022-10-26 RX ORDER — METRONIDAZOLE 500 MG/100ML
500 INJECTION, SOLUTION INTRAVENOUS ONCE
Status: DISCONTINUED | OUTPATIENT
Start: 2022-10-26 | End: 2022-10-26 | Stop reason: SDUPTHER

## 2022-10-26 RX ORDER — AMLODIPINE BESYLATE 2.5 MG/1
2.5 TABLET ORAL DAILY
COMMUNITY
Start: 2022-05-02

## 2022-10-26 RX ORDER — POTASSIUM CHLORIDE 7.45 MG/ML
10 INJECTION INTRAVENOUS PRN
Status: DISCONTINUED | OUTPATIENT
Start: 2022-10-26 | End: 2022-10-29 | Stop reason: HOSPADM

## 2022-10-26 RX ORDER — M-VIT,TX,IRON,MINS/CALC/FOLIC 27MG-0.4MG
1 TABLET ORAL DAILY
Status: DISCONTINUED | OUTPATIENT
Start: 2022-10-27 | End: 2022-10-29 | Stop reason: HOSPADM

## 2022-10-26 RX ORDER — SODIUM CHLORIDE, SODIUM LACTATE, POTASSIUM CHLORIDE, AND CALCIUM CHLORIDE .6; .31; .03; .02 G/100ML; G/100ML; G/100ML; G/100ML
18 INJECTION, SOLUTION INTRAVENOUS ONCE
Status: COMPLETED | OUTPATIENT
Start: 2022-10-26 | End: 2022-10-27

## 2022-10-26 RX ORDER — SODIUM CHLORIDE 9 MG/ML
INJECTION, SOLUTION INTRAVENOUS CONTINUOUS
Status: DISCONTINUED | OUTPATIENT
Start: 2022-10-26 | End: 2022-10-28

## 2022-10-26 RX ORDER — ONDANSETRON 2 MG/ML
4 INJECTION INTRAMUSCULAR; INTRAVENOUS ONCE
Status: COMPLETED | OUTPATIENT
Start: 2022-10-26 | End: 2022-10-26

## 2022-10-26 RX ORDER — ESCITALOPRAM OXALATE 10 MG/1
10 TABLET ORAL DAILY
Status: DISCONTINUED | OUTPATIENT
Start: 2022-10-27 | End: 2022-10-29 | Stop reason: HOSPADM

## 2022-10-26 RX ORDER — PANTOPRAZOLE SODIUM 40 MG/1
40 TABLET, DELAYED RELEASE ORAL
Status: DISCONTINUED | OUTPATIENT
Start: 2022-10-27 | End: 2022-10-29 | Stop reason: HOSPADM

## 2022-10-26 RX ORDER — PROCHLORPERAZINE EDISYLATE 5 MG/ML
10 INJECTION INTRAMUSCULAR; INTRAVENOUS EVERY 6 HOURS PRN
Status: DISCONTINUED | OUTPATIENT
Start: 2022-10-26 | End: 2022-10-29 | Stop reason: HOSPADM

## 2022-10-26 RX ORDER — METRONIDAZOLE 500 MG/100ML
500 INJECTION, SOLUTION INTRAVENOUS EVERY 8 HOURS
Status: DISCONTINUED | OUTPATIENT
Start: 2022-10-27 | End: 2022-10-28

## 2022-10-26 RX ORDER — DICYCLOMINE HCL 20 MG
20 TABLET ORAL
Status: DISCONTINUED | OUTPATIENT
Start: 2022-10-26 | End: 2022-10-28

## 2022-10-26 RX ORDER — ATORVASTATIN CALCIUM 10 MG/1
10 TABLET, FILM COATED ORAL NIGHTLY
Status: DISCONTINUED | OUTPATIENT
Start: 2022-10-26 | End: 2022-10-29 | Stop reason: HOSPADM

## 2022-10-26 RX ORDER — SODIUM CHLORIDE 9 MG/ML
INJECTION, SOLUTION INTRAVENOUS PRN
Status: DISCONTINUED | OUTPATIENT
Start: 2022-10-26 | End: 2022-10-29 | Stop reason: HOSPADM

## 2022-10-26 RX ORDER — DICYCLOMINE HCL 20 MG
20 TABLET ORAL EVERY 6 HOURS
Status: ON HOLD | COMMUNITY
End: 2022-10-29 | Stop reason: HOSPADM

## 2022-10-26 RX ORDER — CIPROFLOXACIN 2 MG/ML
400 INJECTION, SOLUTION INTRAVENOUS EVERY 12 HOURS
Status: DISCONTINUED | OUTPATIENT
Start: 2022-10-27 | End: 2022-10-28

## 2022-10-26 RX ORDER — MONTELUKAST SODIUM 10 MG/1
10 TABLET ORAL NIGHTLY
Status: DISCONTINUED | OUTPATIENT
Start: 2022-10-26 | End: 2022-10-29 | Stop reason: HOSPADM

## 2022-10-26 RX ADMIN — ATORVASTATIN CALCIUM 10 MG: 10 TABLET, FILM COATED ORAL at 22:56

## 2022-10-26 RX ADMIN — ONDANSETRON HYDROCHLORIDE 4 MG: 2 INJECTION, SOLUTION INTRAMUSCULAR; INTRAVENOUS at 15:47

## 2022-10-26 RX ADMIN — DICYCLOMINE HYDROCHLORIDE 20 MG: 20 TABLET ORAL at 22:56

## 2022-10-26 RX ADMIN — IOPAMIDOL 75 ML: 755 INJECTION, SOLUTION INTRAVENOUS at 16:54

## 2022-10-26 RX ADMIN — Medication 10 ML: at 22:53

## 2022-10-26 RX ADMIN — POTASSIUM CHLORIDE 40 MEQ: 20 TABLET, EXTENDED RELEASE ORAL at 17:34

## 2022-10-26 RX ADMIN — SODIUM CHLORIDE, POTASSIUM CHLORIDE, SODIUM LACTATE AND CALCIUM CHLORIDE 1000 ML: 600; 310; 30; 20 INJECTION, SOLUTION INTRAVENOUS at 22:49

## 2022-10-26 RX ADMIN — PANTOPRAZOLE SODIUM 40 MG: 40 INJECTION, POWDER, FOR SOLUTION INTRAVENOUS at 19:33

## 2022-10-26 RX ADMIN — CIPROFLOXACIN 400 MG: 2 INJECTION, SOLUTION INTRAVENOUS at 19:36

## 2022-10-26 RX ADMIN — ONDANSETRON HYDROCHLORIDE 4 MG: 2 INJECTION, SOLUTION INTRAMUSCULAR; INTRAVENOUS at 22:51

## 2022-10-26 RX ADMIN — Medication 250 MG: at 19:33

## 2022-10-26 RX ADMIN — RDII 250 MG CAPSULE 250 MG: at 22:56

## 2022-10-26 RX ADMIN — SODIUM CHLORIDE 1000 ML: 9 INJECTION, SOLUTION INTRAVENOUS at 16:03

## 2022-10-26 RX ADMIN — MONTELUKAST SODIUM 10 MG: 10 TABLET, COATED ORAL at 22:56

## 2022-10-26 RX ADMIN — SODIUM CHLORIDE: 9 INJECTION, SOLUTION INTRAVENOUS at 22:41

## 2022-10-26 RX ADMIN — MAGNESIUM SULFATE HEPTAHYDRATE 2000 MG: 40 INJECTION, SOLUTION INTRAVENOUS at 17:36

## 2022-10-26 ASSESSMENT — ENCOUNTER SYMPTOMS
COUGH: 0
DIARRHEA: 1
BLOOD IN STOOL: 0
ABDOMINAL PAIN: 1
SHORTNESS OF BREATH: 0
RECENT COUGH: 0
VOMITING: 0

## 2022-10-26 NOTE — ED PROVIDER NOTES
[facility] 50        Pt Name: Hilbert Eisenmenger  MRN: [id number]  [place] 1950  Date of evaluation: 10/26/2022  Provider: Carolina Hilliard PA-C  PCP: GAURI Garcia CNP    This patient was seen and evaluated by attending physician Dr. Kiesha Gonzales       Chief Complaint   Patient presents with    Diarrhea     States diarrhea present for 6 weeks after being hospitalized for pneumonia. States she was treated for c-diff and still the diarrhea continues. States today she feels weak and nauseated. HISTORY OF PRESENT ILLNESS   (Location/Symptom, Timing/Onset, Context/Setting, Quality, Duration, Modifying Factors, Severity)  Note limiting factors. Hilbert Eisenmenger is a 67 y.o. female presenting to the emergency department for evaluation of general fatigue weakness has had diarrhea for the past 6 weeks. Had tested positive for C. difficile she was treated with oral vancomycin for 10 days had repeat C. difficile test was negative a week later continues to have diarrhea her doctor recently put her on Bentyl for the abdominal cramps not helping had a CT scan of her abdomen 1 week ago showing no bowel obstruction perforation or thickening. Diverticulosis, no diverticulitis, gallstones and degenerative changes of her back. No fever no vomiting but has been nauseous. No black or bloody stools. States watery stool. States abdominal pain is the same abdominal cramps no worsening. The history is provided by the patient.    Diarrhea  Quality:  Watery  Duration:  6 weeks  Progression:  Worsening  Associated symptoms: abdominal pain    Associated symptoms: no recent cough, no fever, no URI and no vomiting    Abdominal pain:     Location:  Generalized    Quality: cramping      Onset quality:  Gradual    Duration:  6 weeks    Progression:  Unchanged    Chronicity:  New  Risk factors: recent antibiotic use      Nursing Notes were reviewed    REVIEW OF SYSTEMS    (2-9 systems for level 4, 10 or more for level 5)     Review of Systems   Constitutional:  Positive for fatigue. Negative for fever. Respiratory:  Negative for cough and shortness of breath. Cardiovascular:  Positive for chest pain (states had a couple pains in her chest yesterday, resolved). Gastrointestinal:  Positive for abdominal pain and diarrhea. Negative for blood in stool and vomiting. All other systems reviewed and are negative. Positives and Pertinent negatives as per HPI. PAST MEDICAL HISTORY     Past Medical History:   Diagnosis Date    Acromioclavicular joint arthritis 03/15/2018    CHF (congestive heart failure) (HCC)     Chronic back pain     Depression     w/Anxiety    HTN (hypertension)     Hyperlipidemia     Hypothyroid     Macular degeneration, age related     Sarcoidosis          SURGICAL HISTORY     Past Surgical History:   Procedure Laterality Date    BRONCHOSCOPY       SECTION      LUNG BIOPSY      SHOULDER ARTHROSCOPY Left 2020    LEFT SHOULDER VIDEO ARTHROSCOPY, DISTAL CLAVICLE EXCISION, SUBACROMIAL DECOMPRESSION, ROTATOR CUFF REPAIR WITH ROTATION MEDICAL GRAFT performed by Fauzia Wynn DO at [address] [facility] Left 2020    LEFT SHOULDER VIDEO ARTHROSCOPY, DISTAL CLAVICLE EXCISION, SUBACROMIAL DECOMPRESSION, ROTATOR CUFF REPAIR WITH ROTATION MEDICAL GRAFT         CURRENTMEDICATIONS       Current Discharge Medication List        CONTINUE these medications which have NOT CHANGED    Details   amLODIPine (NORVASC) 2.5 MG tablet       dicyclomine (BENTYL) 20 MG tablet Take 20 mg by mouth in the morning and 20 mg at noon and 20 mg in the evening and 20 mg before bedtime.       furosemide (LASIX) 20 MG tablet       montelukast (SINGULAIR) 10 MG tablet TAKE 1 TABLET NIGHTLY  Qty: 90 tablet, Refills: 3      simvastatin (ZOCOR) 20 MG tablet TAKE 1 TABLET NIGHTLY  Qty: 90 tablet, Refills: 1    Associated Diagnoses: Mixed hyperlipidemia      levothyroxine (SYNTHROID) 75 MCG tablet TAKE 1 TABLET DAILY  Qty: 90 tablet, Refills: 3      ibuprofen (ADVIL;MOTRIN) 600 MG tablet Take 1 tablet by mouth every 8 hours as needed for Pain  Qty: 30 tablet, Refills: 0      losartan-hydrochlorothiazide (HYZAAR) 100-25 MG per tablet TAKE 1 TABLET DAILY  Qty: 90 tablet, Refills: 3    Associated Diagnoses: Essential hypertension      escitalopram (LEXAPRO) 10 MG tablet TAKE 1 TABLET DAILY  Qty: 90 tablet, Refills: 3    Associated Diagnoses: Severe episode of recurrent major depressive disorder, without psychotic features (HCC)      Multiple Vitamins-Minerals (WOMENS 50+ MULTI VITAMIN/MIN) TABS Take by mouth daily      omeprazole (PRILOSEC) 20 MG delayed release capsule Take 20 mg by mouth daily      aspirin 81 MG tablet Take 81 mg by mouth daily               ALLERGIES     Codeine    FAMILYHISTORY       Family History   Problem Relation Age of Onset    Hypertension Mother     Diabetes Father     Cancer Son         testicular    Asthma Neg Hx     Emphysema Neg Hx     Heart Failure Neg Hx           SOCIAL HISTORY       Social History     Socioeconomic History    Marital status:      Spouse name: None    Number of children: None    Years of education: None    Highest education level: None   Tobacco Use    Smoking status: Never    Smokeless tobacco: Never   Vaping Use    Vaping Use: Never used   Substance and Sexual Activity    Alcohol use: No    Drug use: No    Sexual activity: Not Currently       SCREENINGS    [place] Coma Scale  Eye Opening: Spontaneous  Best Verbal Response: Oriented  Best Motor Response: Obeys commands  [place] Coma Scale Score: 15        PHYSICAL EXAM    (up to 7 for level 4, 8 or more for level 5)     ED Triage Vitals [10/26/22 1525]   BP Temp Temp Source Heart Rate Resp SpO2 Height Weight   114/70 97.8 °F (36.6 °C) Oral 78 16 96 % -- 171 lb (77.6 kg)       Physical Exam  Vitals and nursing note reviewed. Constitutional:       Appearance: She is well-developed. She is not toxic-appearing. HENT:      Head: Normocephalic and atraumatic. Mouth/Throat:      Mouth: Mucous membranes are dry. Pharynx: Oropharynx is clear. No pharyngeal swelling, oropharyngeal exudate or posterior oropharyngeal erythema. Eyes:      Conjunctiva/sclera: Conjunctivae normal.      Pupils: Pupils are equal, round, and reactive to light. Neck:      Vascular: No JVD. Cardiovascular:      Rate and Rhythm: Normal rate and regular rhythm. Pulmonary:      Effort: Pulmonary effort is normal. No respiratory distress. Breath sounds: Normal breath sounds. No stridor. No wheezing or rales. Abdominal:      General: Bowel sounds are normal. There is no distension. Palpations: Abdomen is soft. Abdomen is not rigid. There is no mass. Tenderness: There is no abdominal tenderness. There is no guarding or rebound. Musculoskeletal:         General: Normal range of motion. Cervical back: Normal range of motion and neck supple. Right lower leg: No edema. Left lower leg: No edema. Skin:     General: Skin is warm and dry. Findings: No rash. Neurological:      Mental Status: She is alert and oriented to person, place, and time. GCS: GCS eye subscore is 4. GCS verbal subscore is 5. GCS motor subscore is 6. Cranial Nerves: No cranial nerve deficit. Sensory: No sensory deficit. Motor: No abnormal muscle tone. Coordination: Coordination normal.   Psychiatric:         Behavior: Behavior normal. Behavior is cooperative.        DIAGNOSTIC RESULTS   LABS:    Labs Reviewed   CBC WITH AUTO DIFFERENTIAL - Abnormal; Notable for the following components:       Result Value    WBC 18.8 (*)     Platelets 822 (*)     Neutrophils Absolute 14.7 (*)     Monocytes Absolute 1.5 (*)     Metamyelocytes Relative 1 (*)     Toxic Granulation Present (*)     Vacuolated Neutrophils Present (*)     Anisocytosis 1+ (*)     Macrocytes 1+ (*)     All other components within normal limits   COMPREHENSIVE METABOLIC PANEL W/ REFLEX TO MG FOR LOW K - Abnormal; Notable for the following components:    Potassium reflex Magnesium 3.1 (*)     Chloride 93 (*)     Glucose 122 (*)     Est, Glom Filt Rate 60 (*)     All other components within normal limits   BLOOD OCCULT STOOL DIAGNOSTIC - Abnormal; Notable for the following components:    Occult Blood Diagnostic   (*)     Value: Result: POSITIVE  Normal range: Negative      All other components within normal limits    Narrative:     ORDER#: [id number]                          ORDERED BY: MARIO GR  SOURCE: Stool                              COLLECTED:  10/26/22 15:39  ANTIB[facility]OTICS AT [facility].:                      RECEIVED :  10/26/22 17:10   MAGNESIUM - Abnormal; Notable for the following components:    Magnesium 1.50 (*)     All other components within normal limits   TSH WITH REFLEX - Abnormal; Notable for the following components:    TSH 5.17 (*)     All other components within normal limits   C DIFF TOXIN/ANTIGEN    Narrative:     ORDER#: [id number]                          ORDERED BY: MARIO GR  SOURCE: Stool                              COLLECTED:  10/26/22 15:39  [facility]IBIOTICS AT [facility].:                      RECEIVED :  10/26/22 17:07  Collect White vial (sterile container)   COVID-19 & INFLUENZA COMBO   GASTROINTESTINAL PANEL, MOLECULAR   CULTURE, BLOOD 1   CULTURE, BLOOD 2   LIPASE   TROPONIN   URINALYSIS WITH REFLEX TO CULTURE   LACTATE, SEPSIS   PROCALCITONIN   O&P SCREEN(CRYPTOSPORIDIUM/GIARDIA/E. HISTOLYTICA) #1   COMPREHENSIVE METABOLIC PANEL W/ REFLEX TO MG FOR LOW K   CBC WITH AUTO DIFFERENTIAL   T4, FREE   HEMOGLOBIN AND HEMATOCRIT       Results for orders placed or performed during the hospital encounter of 10/26/22   Clostridium difficile toxin/antigen    Specimen: Stool   Result Value Ref Range    C.diff Toxin/Antigen       Indeterminate see results of C. difficile amplification(PCR)  Normal Range: Negative     COVID-19 & Influenza Combo    Specimen: Nasopharyngeal Swab   Result Value Ref Range    SARS-CoV-2 RNA, RT PCR NOT DETECTED NOT DETECTED    INFLUENZA A NOT DETECTED NOT DETECTED    INFLUENZA B NOT DETECTED NOT DETECTED   CBC with Auto Differential   Result Value Ref Range    WBC 18.8 (H) 4.0 - 11.0 K/uL    RBC 4.33 4.00 - 5.20 M/uL    Hemoglobin 14.1 12.0 - 16.0 g/dL    Hematocrit 40.7 36.0 - 48.0 %    MCV 93.8 80.0 - 100.0 fL    MCH 32.5 26.0 - 34.0 pg    MCHC 34.6 31.0 - 36.0 g/dL    RDW 13.9 12.4 - 15.4 %    Platelets 518 (H) 686 - 450 K/uL    MPV 6.7 5.0 - 10.5 fL    PLATELET SLIDE REVIEW Adequate     SLIDE REVIEW see below     Path Consult No     Neutrophils % 76.0 %    Lymphocytes % 12.0 %    Monocytes % 8.0 %    Eosinophils % 0.0 %    Basophils % 1.0 %    Neutrophils Absolute 14.7 (H) 1.7 - 7.7 K/uL    Lymphocytes Absolute 2.4 1.0 - 5.1 K/uL    Monocytes Absolute 1.5 (H) 0.0 - 1.3 K/uL    Eosinophils Absolute 0.0 0.0 - 0.6 K/uL    Basophils Absolute 0.2 0.0 - 0.2 K/uL    Bands Relative 1 0 - 7 %    Atypical Lymphocytes Relative 1 0 - 6 %    Metamyelocytes Relative 1 (A) %    Toxic Granulation Present (A)     Vacuolated Neutrophils Present (A)     Anisocytosis 1+ (A)     Macrocytes 1+ (A)    Comprehensive Metabolic Panel w/ Reflex to MG   Result Value Ref Range    Sodium 136 136 - 145 mmol/L    Potassium reflex Magnesium 3.1 (L) 3.5 - 5.1 mmol/L    Chloride 93 (L) 99 - 110 mmol/L    CO2 30 21 - 32 mmol/L    Anion Gap 13 3 - 16    Glucose 122 (H) 70 - 99 mg/dL    BUN 15 7 - 20 mg/dL    Creatinine 1.0 0.6 - 1.2 mg/dL    Est, Glom Filt Rate 60 (A) >60    Calcium 10.2 8.3 - 10.6 mg/dL    Total Protein 7.7 6.4 - 8.2 g/dL    Albumin 4.0 3.4 - 5.0 g/dL    Albumin/Globulin Ratio 1.1 1.1 - 2.2    Total Bilirubin 0.3 0.0 - 1.0 mg/dL    Alkaline Phosphatase 95 40 - 129 U/L    ALT 32 10 - 40 U/L    AST 23 15 - 37 U/L   Lipase   Result Value Ref Range    Lipase 41.0 13.0 - 60.0 U/L   Troponin   Result Value Ref Range    Troponin <0.01 <0.01 ng/mL   Urinalysis with Reflex to Culture    Specimen: Urine, clean catch   Result Value Ref Range    Color, UA Yellow Straw/Yellow    Clarity, UA Clear Clear    Glucose, Ur Negative Negative mg/dL    Bilirubin Urine Negative Negative    Ketones, Urine Negative Negative mg/dL    Specific Gravity, UA 1.015 1.005 - 1.030    Blood, Urine Negative Negative    pH, UA 6.0 5.0 - 8.0    Protein, UA Negative Negative mg/dL    Urobilinogen, Urine 0.2 <2.0 E.U./dL    Nitrite, Urine Negative Negative    Leukocyte Esterase, Urine Negative Negative    Microscopic Examination Not Indicated     Urine Type NotGiven     Urine Reflex to Culture Not Indicated    Blood occult stool #1   Result Value Ref Range    Occult Blood Diagnostic Result: POSITIVE  Normal range: Negative   (A)    Lactate, Sepsis   Result Value Ref Range    Lactic Acid, Sepsis 0.8 0.4 - 1.9 mmol/L   Magnesium   Result Value Ref Range    Magnesium 1.50 (L) 1.80 - 2.40 mg/dL   Procalcitonin   Result Value Ref Range    Procalcitonin 0.07 0.00 - 0.15 ng/mL   TSH with Reflex   Result Value Ref Range    TSH 5.17 (H) 0.27 - 4.20 uIU/mL   EKG 12 Lead   Result Value Ref Range    Ventricular Rate 80 BPM    Atrial Rate 80 BPM    P-R Interval 134 ms    QRS Duration 100 ms    Q-T Interval 396 ms    QTc Calculation (Bazett) 456 ms    P Axis 51 degrees    R Axis -27 degrees    T Axis 22 degrees    Diagnosis       Normal sinus rhythmModerate voltage criteria for LVH, may be normal variantBorderline ECGWhen compared with ECG of 26-AUG-2009 23:58,No significant change was foundConfirmed by Ronelle Rinne MD, [address] ([id number]) on 10/26/2022 5:38:36 PM       All other labs were not returned as of this dictation. EKG: All EKG's are interpreted by the Emergency Department Physician in the absence of a cardiologist.  Please see their note for interpretation of EKG.       RADIOLOGY:   Non-plain film images such as CT, Ultrasound and MRI are read by the radiologist. Plain radiographic images are visualized andpreliminarily interpreted by the  ED Provider with the below findings:      Interpretation perthe Radiologist below, if available at the time of this note:    CT ABDOMEN PELVIS W IV CONTRAST Additional Contrast? None   Final Result   1. Thickening and mild adjacent inflammatory changes involving the sigmoid   colon and rectum consistent with proctocolitis. 2. Colonic diverticulosis. 3. Small hiatal hernia. XR CHEST PORTABLE   Final Result   Clear lungs. PROCEDURES   Unless otherwise noted below, none     Procedures    CRITICAL CARE TIME   Critical care provided for this patient of which 0 min were spend on critical care and decision making. 0 min spent on procedures. There was imminent failure of an organ system which required critical intervention to prevent clinically significant progression of life threatening deterioration of the patient's condition to the point of disability or death.       CONSULTS:  IP CONSULT TO HOSPITALIST  IP CONSULT TO GI      EMERGENCY DEPARTMENT COURSE and DIFFERENTIAL DIAGNOSIS/MDM:   Vitals:    Vitals:    10/26/22 1938 10/26/22 2032 10/26/22 2115 10/27/22 0337   BP: (!) 110/47 108/60 130/60 (!) 108/55   Pulse: 85 83 77 65   Resp: 22 18 18 16   Temp:   98.2 °F (36.8 °C) 98.6 °F (37 °C)   TempSrc:   Oral Oral   SpO2: 97% 91% 95% 95%   Weight:   178 lb 6.4 oz (80.9 kg) 178 lb 3.2 oz (80.8 kg)   Height:   5' 4\" (1.626 m)        Patient was given thefollowing medications:  Medications   dicyclomine (BENTYL) tablet 20 mg (20 mg Oral Given 10/26/22 2256)   escitalopram (LEXAPRO) tablet 10 mg (has no administration in time range)   levothyroxine (SYNTHROID) tablet 75 mcg (has no administration in time range)   therapeutic multivitamin-minerals 1 tablet (has no administration in time range)   montelukast (SINGULAIR) tablet 10 mg (10 mg Oral Given 10/26/22 2256) pantoprazole (PROTONIX) tablet 40 mg (has no administration in time range)   atorvastatin (LIPITOR) tablet 10 mg (10 mg Oral Given 10/26/22 2256)   morphine (PF) injection 2 mg (has no administration in time range)   saccharomyces boulardii (FLORASTOR) capsule 250 mg (250 mg Oral Given 10/26/22 2256)   sodium chloride flush 0.9 % injection 5-40 mL (10 mLs IntraVENous Given 10/26/22 2253)   sodium chloride flush 0.9 % injection 5-40 mL (has no administration in time range)   0.9 % sodium chloride infusion (has no administration in time range)   acetaminophen (TYLENOL) tablet 650 mg (has no administration in time range)     Or   acetaminophen (TYLENOL) suppository 650 mg (has no administration in time range)   ciprofloxacin (CIPRO) IVPB 400 mg (has no administration in time range)   metronidazole (FLAGYL) 500 mg in 0.9% NaCl 100 mL IVPB premix (has no administration in time range)   vancomycin (VANCOCIN) oral solution 125 mg (125 mg Oral Given 10/27/22 0045)   0.9 % sodium chloride infusion ( IntraVENous New Bag 10/26/22 2241)   potassium chloride (KLOR-CON M) extended release tablet 40 mEq (has no administration in time range)     Or   potassium bicarb-citric acid (EFFER-K) effervescent tablet 40 mEq (has no administration in time range)     Or   potassium chloride 10 mEq/100 mL IVPB (Peripheral Line) (has no administration in time range)   magnesium sulfate 1000 mg in dextrose 5% 100 mL IVPB (has no administration in time range)   ondansetron (ZOFRAN) injection 4 mg (4 mg IntraVENous Given 10/26/22 2251)   prochlorperazine (COMPAZINE) injection 10 mg (has no administration in time range)   ondansetron (ZOFRAN) injection 4 mg (4 mg IntraVENous Given 10/26/22 1547)   0.9 % sodium chloride bolus (0 mLs IntraVENous Stopped 10/26/22 1844)   potassium chloride (KLOR-CON M) extended release tablet 40 mEq (40 mEq Oral Given 10/26/22 1734)   magnesium sulfate 2000 mg in 50 mL IVPB premix (0 mg IntraVENous Stopped 10/26/22 1924)   iopamidol (ISOVUE-370) 76 % injection 75 mL (75 mLs IntraVENous Given 10/26/22 1654)   ciprofloxacin (CIPRO) IVPB 400 mg (0 mg IntraVENous Stopped 10/26/22 2038)   vancomycin (VANCOCIN) oral solution 250 mg (250 mg Oral Given 10/26/22 1933)   pantoprazole (PROTONIX) injection 40 mg (40 mg IntraVENous Given 10/26/22 1933)   lactated ringers bolus (0 mLs IntraVENous Stopped 10/27/22 0050)       Is this patient to be included in the SEP-1 Core Measure due to severe sepsis or septic shock? No   Exclusion criteria - the patient is NOT to be included for SEP-1 Core Measure due to:  Does not meet criteria for severe sepsis or septic shock    ED course  Patient presented to the ER for evaluation of diarrhea abdominal pain and general fatigue. Recent positive C. difficile stool had been treated with a course of oral vancomycin but not any better. White count is 18. Hemoccult stool positive. C. difficile stool was indeterminate. CT scan abdomen pelvis showing proctocolitis. She was treated with Cipro Flagyl and oral vancomycin and plan for admission. FINAL IMPRESSION      1. Proctocolitis    2. Infectious diarrhea    3. Leukocytosis, unspecified type    4. Hypokalemia    5. Hypomagnesemia    6. Positive occult stool blood test          DISPOSITION/PLAN   DISPOSITION Admitted    PATIENT REFERREDTO:  No follow-up provider specified.     DISCHARGE MEDICATIONS:  Current Discharge Medication List          DISCONTINUED MEDICATIONS:  Current Discharge Medication List        STOP taking these medications       dexamethasone (DECADRON) 4 MG tablet Comments:   Reason for Stopping:         albuterol sulfate  (90 Base) MCG/ACT inhaler Comments:   Reason for Stopping:         magnesium 30 MG tablet Comments:   Reason for Stopping:         Misc Natural Products (FIBER 7 PO) Comments:   Reason for Stopping:                      (Please note that portions ofthis note were completed with a voice recognition program. Efforts were made to edit the dictations but occasionally words are mis-transcribed.)    Jyotsna Novoa PA-C (electronically signed)           Gloria Feliz PA-C  10/27/22 8417

## 2022-10-26 NOTE — ED PROVIDER NOTES
I independently examined and evaluated Sekou Davis. I personally saw the patient and performed a substantive portion of the visit including all aspects of the medical decision making. I am the primary physician of record. In brief, Sekou Davis is a 67 y.o. female with a past medical history of tension GERD, C. difficile, tremor, who presents to the ED complaining of diarrhea. Patient was mated 6 weeks ago for pneumonia. Patient reports that the symptoms have resolved, she denies any cough or shortness of breath. She did develop diarrhea and was C. difficile positive. She then later tested negative for C. difficile after being treated with oral vancomycin. However, she continues to have watery diarrhea. She reports 6-7 episodes every 12 hours and states that it smells very foul. She also reports some generalized crampy abdominal pain. She also reports generalized weakness. No fever palliative or provocative factors    10/3/22  Ct abd/pelvis:  Impression   1. Diverticulosis worse in the sigmoid but no acute diverticulitis. No small   bowel abnormality. 2. Cholelithiasis but no acute cholecystitis. 3. Significant degenerative disc disease with multiple levels of spinal   stenosis at L4-L5 and L5-S1 as well as paracentral disc herniation at L5-S1   with lateral recess and severe left foraminal stenosis at this level. REVIEW OF SYSTEMS  All systems reviewed, pertinent positives per HPI otherwise noted to be negative. Focused exam revealed   PHYSICAL EXAM  /70   Pulse 78   Temp 97.8 °F (36.6 °C) (Oral)   Resp 16   Wt 171 lb (77.6 kg)   LMP  (LMP Unknown)   SpO2 96%   BMI 31.28 kg/m²    GENERAL APPEARANCE: Awake and alert. Cooperative. no distress. HENT: Normocephalic. Atraumatic. Mucous membranes are moist  NECK: Supple. Full range of motion of the neck without stiffness or pain. EYES: PERRL. EOM's grossly intact. HEART/CHEST: RRR. No murmurs.   Chest wall is not tender to palpation. LUNGS: Respirations unlabored. CTAB. Good air exchange. Speaking comfortably in full sentences. ABDOMEN: No tenderness. Soft. Non-distended. No masses. No organomegaly. No guarding or rebound. MUSCULOSKELETAL: No extremity edema. Compartments soft. No deformity. No tenderness in the extremities. All extremities neurovascularly intact. SKIN: Warm and dry. No acute rashes. NEUROLOGICAL: Alert and oriented. No gross facial drooping. Strength 5/5, sensation intact. PSYCHIATRIC: Normal mood and affect. ED course / MDM:   Overall tired appearing patient, in no acute distress, presenting for persistent diarrhea. Physical exam remarkable for abdominal tenderness. I personally saw the patient and performed a substantive portion of the visit including all aspects of the medical decision making. Differential diagnosis includes but is not limited to: C. difficile, colitis, diverticulitis, gastroenteritis, appendicitis, other infectious diarrhea, pneumonia      Workup showed:    Imaging:  CT ABDOMEN PELVIS W IV CONTRAST Additional Contrast? None   Final Result   1. Thickening and mild adjacent inflammatory changes involving the sigmoid   colon and rectum consistent with proctocolitis. 2. Colonic diverticulosis. 3. Small hiatal hernia. XR CHEST PORTABLE   Final Result   Clear lungs. ECG:  The Ekg interpreted by me shows  normal sinus rhythm with a rate of 80  Axis is   Left axis deviation  QTc is   prolonged to 456  Intervals and Durations are unremarkable. ST Segments: no acute change  No significant change from prior EKG dated 2/11/20       ED Course as of 10/27/22 0013   Wed Oct 26, 2022   1613 CXR shows no evidence of pneumonia, your work-up thank you pleural effusion, pulmonary edema, or pneumothorax [ER]   1637 Leukocytosis to 18. 8.   No anemia or thrombocytopenia [ER]   1638 Troponin within normal limits [ER]   1638 Lipase within normal limits, low suspicion for pancreatitis [ER]   1638 Hypokalemia to 3.1, hypochloremia 93, hypomagnesemia 1.5. No other electrolyte abnormalities or evidence of kidney dysfunction. Will give repletion. [ER]   1638 Liver function testing unremarkable, low suspicion for hepatitis or other acute liver pathology. [ER]   1727 Urinalysis shows no evidence of blood or infection [ER]   1747 CT abd/pelvis: IMPRESSION:  1. Thickening and mild adjacent inflammatory changes involving the sigmoid colon and rectum consistent with proctocolitis. 2. Colonic diverticulosis. 3. Small hiatal hernia. [ER]   1851 Indeterminate C dif [ER]   7527 COVID and flu swab negative [ER]   2327 Procalcitonin and lactate within normal limits. Low suspicion for sepsis. [ER]      ED Course User Index  [ER] Disha Ro MD     Is this patient to be included in the SEP-1 Core Measure due to severe sepsis or septic shock? No   Exclusion criteria - the patient is NOT to be included for SEP-1 Core Measure due to:  May have criteria for sepsis, but does not meet criteria for severe sepsis or septic shock    Based on results of work-up, I am concerned for proctocolitis and possible C. difficile. Antibiotic choices discussed with inpatient team, patient given oral vancomycin as well as IV ciprofloxacin at this time. Patient given potassium and magnesium repletion. Do feel the patient requires admission for further work-up and management. Discussed the patient with hospital team.      CLINICAL IMPRESSION  1. Proctocolitis    2. Infectious diarrhea    3. Leukocytosis, unspecified type    4. Hypokalemia    5. Hypomagnesemia    6. Positive occult stool blood test        Blood pressure 130/60, pulse 77, temperature 98.2 °F (36.8 °C), temperature source Oral, resp. rate 18, height 5' 4\" (1.626 m), weight 178 lb 6.4 oz (80.9 kg), SpO2 95 %, not currently breastfeeding. 25[address] Florinda Sims was admitted in stable condition.       All diagnostic, treatment, and disposition decisions were made by myself in conjunction with the advanced practice provider. For all further details of the patient's emergency department visit, please see the advanced practice provider's documentation. Comment: Please note this report has been produced using speech recognition software and may contain errors related to that system including errors in grammar, punctuation, and spelling, as well as words and phrases that may be inappropriate. If there are any questions or concerns please feel free to contact the dictating provider for clarification.         Byron Chavis MD  10/27/22 2153

## 2022-10-26 NOTE — ED NOTES
[facility] sent to Dr. Franklin [facility]  10/26/22 [id number] [facility]

## 2022-10-26 NOTE — ED NOTES
Report received from KARISHMA SHABAZZ [facility]. Patient resting in bed. Awake and alert. No distress noted. Bed in low position. Side rail x1. Call light in reach.       Joseline Gutierrez RN  10/26/22 1944

## 2022-10-26 NOTE — H&P
Hospital Medicine History & Physical      PCP: Homer Hernandez, APRN - CNP    Date of Service: Pt seen/examined on 10/26/22 and admitted on 10/26/22 to Inpatient    Chief Complaint   Patient presents with    Diarrhea     States diarrhea present for 6 weeks after being hospitalized for pneumonia. States she was treated for c-diff and still the diarrhea continues. States today she feels weak and nauseated. History Of Present Illness: The patient is a 67 y.o. female with PMH below, presents with diarrhea, abd cramping, nausea, recent c diff/PNA. Pt reports that she tested positive for c diff ~ 6 wks ago. She completed 10 d oral vanco.  She subsequently tested negative for c diff. However, she had persistent watery diarrhea and diffuse crampy abd pain since her Dx. she has had associated nausea without emesis. She is concerned that she may still have C. difficile. Past Medical History:        Diagnosis Date    Acromioclavicular joint arthritis 3/15/2018    Chronic back pain     Depression     w/Anxiety    HTN (hypertension)     Hypothyroid     Macular degeneration, age related     Sarcoidosis        Past Surgical History:        Procedure Laterality Date    BRONCHOSCOPY       SECTION      LUNG BIOPSY      SHOULDER ARTHROSCOPY Left 2020    LEFT SHOULDER VIDEO ARTHROSCOPY, DISTAL CLAVICLE EXCISION, SUBACROMIAL DECOMPRESSION, ROTATOR CUFF REPAIR WITH ROTATION MEDICAL GRAFT performed by Faisal Kerr DO at [address] [facility] Left 2020    LEFT SHOULDER VIDEO ARTHROSCOPY, DISTAL CLAVICLE EXCISION, SUBACROMIAL DECOMPRESSION, ROTATOR CUFF REPAIR WITH ROTATION MEDICAL GRAFT       Medications Prior to Admission:    Prior to Admission medications    Medication Sig Start Date End Date Taking?  Authorizing Provider   amLODIPine (NORVASC) 2.5 MG tablet  22  Yes Historical Provider, MD   dicyclomine (BENTYL) 20 MG tablet Take 20 mg by mouth in the morning and 20 mg at noon and 20 mg in the evening and 20 mg before bedtime. Yes Historical Provider, MD   furosemide (LASIX) 20 MG tablet  10/17/22   Historical Provider, MD   montelukast (SINGULAIR) 10 MG tablet TAKE 1 TABLET NIGHTLY 1/10/22   Safia Vieyra,    simvastatin (ZOCOR) 20 MG tablet TAKE 1 TABLET NIGHTLY 11/18/21   Safia Yusuf, DO   levothyroxine (SYNTHROID) 75 MCG tablet TAKE 1 TABLET DAILY 9/14/20   Safia Vieyra, DO   dexamethasone (DECADRON) 4 MG tablet 1 tab tid x 3 days, then 1 tab bid x 3 days, then 1 tab q AM x 3 days and stop  Patient not taking: Reported on 10/26/2022 3/30/20   Safia Vieyra DO   ibuprofen (ADVIL;MOTRIN) 600 MG tablet Take 1 tablet by mouth every 8 hours as needed for Pain 2/19/20 2/29/20  Francisco Bones Ferguson, DO   losartan-hydrochlorothiazide (HYZAAR) 100-25 MG per tablet TAKE 1 TABLET DAILY 12/12/19   Safia Vieyra DO   escitalopram (LEXAPRO) 10 MG tablet TAKE 1 TABLET DAILY 12/12/19   Safia Vieyra, DO   albuterol sulfate  (90 Base) MCG/ACT inhaler Inhale 2 puffs into the lungs 4 times daily as needed for Wheezing  Patient not taking: Reported on 10/26/2022 3/1/19   MILAD Luna Junior   Multiple Vitamins-Minerals (WOMENS 50+ [place] 74 VITAMIN/MIN) TABS Take by mouth daily    Historical Provider, MD   magnesium 30 MG tablet Take 30 mg by mouth 2 times daily  Patient not taking: No sig reported    Historical Provider, MD   omeprazole (PRILOSEC) 20 MG delayed release capsule Take 20 mg by mouth daily    Historical Provider, MD   aspirin 81 MG tablet Take 81 mg by mouth daily    Historical Provider, MD   Misc Natural Products (FIBER 7 PO) Take by mouth  Patient not taking: Reported on 10/26/2022    Historical Provider, MD       Allergies:  Codeine    Social History:    TOBACCO:   reports that she has never smoked. She has never used smokeless tobacco.  ETOH:   reports no history of alcohol use. Family History:  Reviewed in detail and negative for DM, Early CAD, Cancer (except as below). Positive as follows:        Problem Relation Age of Onset    Hypertension Mother     Diabetes Father     Cancer Son         testicular    Asthma Neg Hx     Emphysema Neg Hx     Heart Failure Neg Hx        REVIEW OF SYSTEMS:   Pertinent positives/negatives as follows: diarrhea, abd cramping, nausea, recent c diff/PNA, and as discussed in HPI, otherwise a complete ROS performed and all other systems are negative. PHYSICAL EXAM PERFORMED:  BP (!) 111/55   Pulse 82   Temp 97.8 °F (36.6 °C) (Oral)   Resp 17   Wt 171 lb (77.6 kg)   LMP  (LMP Unknown)   SpO2 93%   BMI 31.28 kg/m²   GEN:  A&Ox3, NAD. HEENT:  NC/AT,EOMI, dry MM, no erythema/exudates or visible masses. CVS:  Normal S1,S2. RRR. Without M/G/R.   LUNG:   CTA-B. No wheezes, rales or rhonchi. ABD:  Soft, ND/NT, BS+ x4. Without G/R.  EXT: 2+ pulses, no c/c/e. Brisk cap refill. PSY:  Thought process intact, affect appropriate. FREEMAN:  CN III-XII grossly intact. Moves all 4 spontaneously. Sensory grossly intact. SKIN: No rash or lesions on visible skin. Chart review shows recent radiographs:  CT ABDOMEN W CONTRAST Additional Contrast? Oral    Result Date: 10/18/2022  EXAMINATION: CT ABDOMEN WITH CONTRAST 10/18/2022 2:59 pm TECHNIQUE: CT of the abdomen was performed with the administration of intravenous contrast. Multiplanar reformatted images are provided for review. Automated exposure control, iterative reconstruction, and/or weight based adjustment of the mA/kV was utilized to reduce the radiation dose to as low as reasonably achievable. COMPARISON: None HISTORY: ORDERING SYSTEM PROVIDED HISTORY: Abdominal cramping TECHNOLOGIST PROVIDED HISTORY: Additional Contrast?->Oral STAT Creatinine as needed:->No Reason for Exam: hospitalized for pneumonia,  ended up with c-diff, diarrhea,  still having cramping. this scan is a follow up. dx with diverticulosis in hospital along with CHF FINDINGS: Lower Chest: Mild atelectatic changes left lower lobe. Organs: The liver, pancreas and spleen appear unremarkable. There is evidence of cholelithiasis but no acute cholecystitis. Adrenals kidneys aorta and IVC appear stable. GI/Bowel: No evidence of bowel obstruction perforation or thickening. Evidence of diverticulosis in the sigmoid but no acute diverticulitis. No evidence of acute colitis or enteritis. Peritoneum/Retroperitoneum: No evidence of retroperitoneal lymphadenopathy or acute peritoneal findings. Bones/Soft Tissues: Degenerative disc disease at L5-S1. No evidence of focal destructive changes. Significant central stenosis at L4-L5 as well as L5-S1 with left paracentral disc herniation affecting the left lateral recess with severe left foraminal stenosis. 1. Diverticulosis worse in the sigmoid but no acute diverticulitis. No small bowel abnormality. 2. Cholelithiasis but no acute cholecystitis. 3. Significant degenerative disc disease with multiple levels of spinal stenosis at L4-L5 and L5-S1 as well as paracentral disc herniation at L5-S1 with lateral recess and severe left foraminal stenosis at this level. CT ABDOMEN PELVIS W IV CONTRAST Additional Contrast? None    Result Date: 10/26/2022  EXAMINATION: CT OF THE ABDOMEN AND PELVIS WITH CONTRAST 10/26/2022 3:54 pm TECHNIQUE: CT of the abdomen and pelvis was performed with the administration of intravenous contrast. Multiplanar reformatted images are provided for review. Automated exposure control, iterative reconstruction, and/or weight based adjustment of the mA/kV was utilized to reduce the radiation dose to as low as reasonably achievable. COMPARISON: CT of the abdomen 10/18/2022 and CT of the pelvis 03/06/2020.  HISTORY: ORDERING SYSTEM PROVIDED HISTORY: abdominal pain diarrhea TECHNOLOGIST PROVIDED HISTORY: Additional Contrast?->None Reason for exam:->abdominal pain diarrhea Decision Support Exception - unselect if not a suspected or confirmed emergency medical condition->Emergency Medical Condition (MA) Reason for Exam: Diarrhea (States diarrhea present for 6 weeks after being hospitalized for pneumonia. States she was treated for c-diff and still the diarrhea continues. States today she feels weak and nauseated. ) FINDINGS: Lower Chest: The lung bases are unremarkable. There is no pleural effusion. Organs: The liver, spleen, adrenal glands and pancreas are unremarkable. There are gallstones in the gallbladder. Small renal cysts are not significantly changed. There is no ureteral stone or hydronephrosis. GI/Bowel: The sigmoid colon and rectum appear thickened and there is mild adjacent fat stranding. There are colonic diverticula. There is a small hiatal hernia. The bowel and stomach are otherwise unremarkable. Pelvis: The bladder is nearly empty and is otherwise unremarkable. Peritoneum/Retroperitoneum: No free fluid, free air or focal fluid collection is identified. The abdominal aorta is normal in caliber. Bones/Soft Tissues: There is no destructive bone lesion. 1. Thickening and mild adjacent inflammatory changes involving the sigmoid colon and rectum consistent with proctocolitis. 2. Colonic diverticulosis. 3. Small hiatal hernia. XR CHEST PORTABLE    Result Date: 10/26/2022  EXAMINATION: ONE XRAY VIEW OF THE CHEST 10/26/2022 3:31 pm COMPARISON: None. HISTORY: ORDERING SYSTEM PROVIDED HISTORY: chest pain TECHNOLOGIST PROVIDED HISTORY: Reason for exam:->chest pain Reason for Exam: chest pain FINDINGS: Clear lungs. No pleural effusion or pneumothorax. Cardiomediastinal silhouette is enlarged. Degenerative disease of the visualized osseous structures. Clear lungs.        EKG:    EKG 12 Lead  Order: [id number]  Status: Final result    Visible to patient: Yes (not seen)    Next appt: 11/08/2022 at 09:30 AM in Cardiology Michi Sanchez MD)    0 Result Notes  Component Ref Range & Units 10/26/22 1600    Ventricular Rate BPM 80    Atrial Rate BPM 80    P-R Interval ms 134    QRS Duration ms 100    Q-T Interval ms 396    QTc Calculation (Bazett) ms 456    P Axis degrees 51    R Axis degrees -27    T Axis degrees 22    Diagnosis  Normal sinus rhythmModerate voltage criteria for LVH, may be normal variantBorderline ECGWhen compared with ECG of 26-AUG-2009 23:58,No significant change was foundConfirmed by LEWIS SERRANO, [address] ([place]) on 10/26/2022 5:38:36 PM          Stress Echocardiography Report      Demographics      Patient Name      Sabra Mayo      Date of Study     04/04/2017          Gender              Female      Patient Number    [id number]          Date of Birth       1950      Visit Number      [id number]         Age                 77 year(s)      Accession Number  [id number]           Room Number         op      Corporate ID      [id number]            Sonographer         Rachana Brown                                                             [facility]      Ordering          Kaitlyn Dinh, [facility] 96, DO Interpreting        [facility].   Physician                             Physician           Mariposa Valdivia MD, Tobin Shields      The procedure was explained in detail to the patient. Risks,   complications and alternative treatments were reviewed. Written consent   was obtained. Procedure     Type of Study      Stress procedure:ECHOCARDIOGRAM STRESS TEST. Procedure Date  Date: 04/04/2017 Start: 11:14 AM End: 12:02 PM     Study Location: [facility] - Echo Lab  Technical Quality: Adequate visualization     Indications:Chest pain. Patient Status: Routine     Contrast Medium: Definity.      Height: 63 inches Weight: 193 pounds BSA: 1.9 m2 BMI: 34.19 kg/m2     Rhythm: Within normal limits HR: 68 bpm BP: 128/78 mmHg      Conclusions      Summary   Normal stress echocardiogram.      Signature      ------------------------------------------------------------------   Electronically signed by Mariposa Valdivia MD, Kira Nickerson   (Interpreting physician) on 04/04/2017 at 03:31 PM   ------------------------------------------------------------------    CBC:  Recent Labs     10/26/22  1540   WBC 18.8*   HGB 14.1   HCT 40.7   *      RENAL  Recent Labs     10/26/22  1540      K 3.1*   CL 93*   CO2 30   BUN 15   CREATININE 1.0   GLUCOSE 122*     Hemoglobin a1c:  Lab Results   Component Value Date    LABA1C 5.1 07/17/2020    LABA1C 5.6 04/03/2017       LFT'S:  Recent Labs     10/26/22  1540   AST 23   ALT 32   BILITOT 0.3   ALKPHOS 95     CARDIAC ENZYMES:   Recent Labs     10/26/22  1540   TROPONINI <0.01     LACTIC ACID:  Recent Labs     10/26/22  1846   LACTSEPSIS 0.8     U/A:  Recent Labs     10/26/22  1649   LEUKOCYTESUR Negative   COLORU Yellow   CLARITYU Clear   SPECGRAV 1.015   BLOODU Negative   GLUCOSEU Negative     PHYSICIAN CERTIFICATION  I certify that Cholo Herrera is expected to be hospitalized for 2 midnights based on the following assessment and plan:    ASSESSMENT/PLAN:  Sepsis (RR, RR; lactic nml). Likely 2/2 #2. IV metronidazole and cipro, PO vanco.  Tele, IVF, f/u Cx. No need for pressors at this time. Proctocolitis and persistent diarrhea x6 wks. GI bleed? Guaiac + w/ nml HGB. Hold home ASA. Likely representing persistent c diff colitis (recent confirmed case, completed course of oral vanco). C dif lab indeterminate, lab sent confirmation to [facility]. k GI PCR.  q8h, IVF, GI c/s. ABX as above. If c diff testing does turn out positive may be a candidate for stool transplant. Ordered probiotic. Abd cramping, low dose morphine, cont home Bentyl. Hypokalemia, 3.1, replaced. Replacement protocol. Hypomagnesemia, 1.5, replaced. Replacement protocol. WBC, 18.8, Abx as above. Repeat in am.   Hypothyroidism, continue home levothyroxine, check TSH.   HTN, mildly low DBP's, suspect 2/2 volume depletion. Will hold Cozaar, amlodipine, Lasix.      DVT Prophylaxis: SCD  Diet: clears  Code Status: Full Code   PT/OT Eval Status: Will order if needed and as patient condition allows  Dispo - Admit to inpatient     Lalo Arguello MD    Thank you Shelton Mcburney, APRN - CNP for the opportunity to be involved in this patient's care. If you have any questions or concerns please feel free to contact me via the [employer] at [phone]. This chart was generated using the [address] [place], [place] [address]  dictation system. I created this record but it may contain dictation errors given the limitations of this technology.

## 2022-10-27 PROBLEM — E03.9 HYPOTHYROIDISM: Status: ACTIVE | Noted: 2022-10-27

## 2022-10-27 PROBLEM — E83.42 HYPOMAGNESEMIA: Status: ACTIVE | Noted: 2022-10-27

## 2022-10-27 PROBLEM — E87.6 HYPOKALEMIA: Status: ACTIVE | Noted: 2022-10-27

## 2022-10-27 PROBLEM — Z86.79 HISTORY OF CHF (CONGESTIVE HEART FAILURE): Status: ACTIVE | Noted: 2022-10-27

## 2022-10-27 PROBLEM — A09 INFECTIOUS DIARRHEA: Status: ACTIVE | Noted: 2022-10-27

## 2022-10-27 PROBLEM — D72.829 LEUKOCYTOSIS: Status: ACTIVE | Noted: 2022-10-27

## 2022-10-27 PROBLEM — K52.9 PROCTOCOLITIS: Status: ACTIVE | Noted: 2022-10-27

## 2022-10-27 PROBLEM — R19.5 POSITIVE OCCULT STOOL BLOOD TEST: Status: ACTIVE | Noted: 2022-10-27

## 2022-10-27 LAB
A/G RATIO: 1.1 (ref 1.1–2.2)
ALBUMIN SERPL-MCNC: 3.1 G/DL (ref 3.4–5)
ALP BLD-CCNC: 72 U/L (ref 40–129)
ALT SERPL-CCNC: 21 U/L (ref 10–40)
ANION GAP SERPL CALCULATED.3IONS-SCNC: 8 MMOL/L (ref 3–16)
AST SERPL-CCNC: 15 U/L (ref 15–37)
BASOPHILS ABSOLUTE: 0.1 K/UL (ref 0–0.2)
BASOPHILS RELATIVE PERCENT: 0.7 %
BILIRUB SERPL-MCNC: <0.2 MG/DL (ref 0–1)
BUN BLDV-MCNC: 10 MG/DL (ref 7–20)
C. DIFFICILE TOXIN MOLECULAR: ABNORMAL
CALCIUM SERPL-MCNC: 8.8 MG/DL (ref 8.3–10.6)
CHLORIDE BLD-SCNC: 102 MMOL/L (ref 99–110)
CO2: 28 MMOL/L (ref 21–32)
CREAT SERPL-MCNC: 0.8 MG/DL (ref 0.6–1.2)
CRYPTOSPORIDIUM ANTIGEN STOOL: NORMAL
E HISTOLYTICA ANTIGEN STOOL: NORMAL
EOSINOPHILS ABSOLUTE: 0.2 K/UL (ref 0–0.6)
EOSINOPHILS RELATIVE PERCENT: 1.6 %
GFR SERPL CREATININE-BSD FRML MDRD: >60 ML/MIN/{1.73_M2}
GI BACTERIAL PATHOGENS BY PCR: NORMAL
GIARDIA ANTIGEN STOOL: NORMAL
GLUCOSE BLD-MCNC: 102 MG/DL (ref 70–99)
HCT VFR BLD CALC: 35.2 % (ref 36–48)
HCT VFR BLD CALC: 36.2 % (ref 36–48)
HCT VFR BLD CALC: 36.7 % (ref 36–48)
HEMOGLOBIN: 11.8 G/DL (ref 12–16)
HEMOGLOBIN: 12.2 G/DL (ref 12–16)
HEMOGLOBIN: 12.3 G/DL (ref 12–16)
LYMPHOCYTES ABSOLUTE: 3.1 K/UL (ref 1–5.1)
LYMPHOCYTES RELATIVE PERCENT: 24.1 %
MAGNESIUM: 2 MG/DL (ref 1.8–2.4)
MCH RBC QN AUTO: 32.2 PG (ref 26–34)
MCHC RBC AUTO-ENTMCNC: 33.6 G/DL (ref 31–36)
MCV RBC AUTO: 95.7 FL (ref 80–100)
MONOCYTES ABSOLUTE: 2 K/UL (ref 0–1.3)
MONOCYTES RELATIVE PERCENT: 15.6 %
NEUTROPHILS ABSOLUTE: 7.4 K/UL (ref 1.7–7.7)
NEUTROPHILS RELATIVE PERCENT: 58 %
ORGANISM: ABNORMAL
PDW BLD-RTO: 14 % (ref 12.4–15.4)
PLATELET # BLD: 426 K/UL (ref 135–450)
PMV BLD AUTO: 7.4 FL (ref 5–10.5)
POTASSIUM REFLEX MAGNESIUM: 3.4 MMOL/L (ref 3.5–5.1)
RBC # BLD: 3.68 M/UL (ref 4–5.2)
SODIUM BLD-SCNC: 138 MMOL/L (ref 136–145)
T4 FREE: 1.5 NG/DL (ref 0.9–1.8)
TOTAL PROTEIN: 5.8 G/DL (ref 6.4–8.2)
WBC # BLD: 12.8 K/UL (ref 4–11)

## 2022-10-27 PROCEDURE — 2580000003 HC RX 258: Performed by: INTERNAL MEDICINE

## 2022-10-27 PROCEDURE — 1200000000 HC SEMI PRIVATE

## 2022-10-27 PROCEDURE — 6360000002 HC RX W HCPCS: Performed by: INTERNAL MEDICINE

## 2022-10-27 PROCEDURE — 85018 HEMOGLOBIN: CPT

## 2022-10-27 PROCEDURE — 85014 HEMATOCRIT: CPT

## 2022-10-27 PROCEDURE — 85025 COMPLETE CBC W/AUTO DIFF WBC: CPT

## 2022-10-27 PROCEDURE — 87493 C DIFF AMPLIFIED PROBE: CPT

## 2022-10-27 PROCEDURE — 6370000000 HC RX 637 (ALT 250 FOR IP): Performed by: INTERNAL MEDICINE

## 2022-10-27 PROCEDURE — 36415 COLL VENOUS BLD VENIPUNCTURE: CPT

## 2022-10-27 PROCEDURE — 99233 SBSQ HOSP IP/OBS HIGH 50: CPT | Performed by: NURSE PRACTITIONER

## 2022-10-27 PROCEDURE — 80053 COMPREHEN METABOLIC PANEL: CPT

## 2022-10-27 PROCEDURE — 87324 CLOSTRIDIUM AG IA: CPT

## 2022-10-27 PROCEDURE — 87449 NOS EACH ORGANISM AG IA: CPT

## 2022-10-27 PROCEDURE — 83735 ASSAY OF MAGNESIUM: CPT

## 2022-10-27 RX ADMIN — RDII 250 MG CAPSULE 250 MG: at 08:23

## 2022-10-27 RX ADMIN — SODIUM CHLORIDE: 9 INJECTION, SOLUTION INTRAVENOUS at 14:07

## 2022-10-27 RX ADMIN — DICYCLOMINE HYDROCHLORIDE 20 MG: 20 TABLET ORAL at 12:02

## 2022-10-27 RX ADMIN — Medication 125 MG: at 00:45

## 2022-10-27 RX ADMIN — CIPROFLOXACIN 400 MG: 2 INJECTION, SOLUTION INTRAVENOUS at 06:34

## 2022-10-27 RX ADMIN — MONTELUKAST SODIUM 10 MG: 10 TABLET, COATED ORAL at 20:33

## 2022-10-27 RX ADMIN — Medication 125 MG: at 05:57

## 2022-10-27 RX ADMIN — PANTOPRAZOLE SODIUM 40 MG: 40 TABLET, DELAYED RELEASE ORAL at 05:55

## 2022-10-27 RX ADMIN — SODIUM CHLORIDE: 9 INJECTION, SOLUTION INTRAVENOUS at 05:59

## 2022-10-27 RX ADMIN — RDII 250 MG CAPSULE 250 MG: at 20:32

## 2022-10-27 RX ADMIN — ATORVASTATIN CALCIUM 10 MG: 10 TABLET, FILM COATED ORAL at 20:33

## 2022-10-27 RX ADMIN — DICYCLOMINE HYDROCHLORIDE 20 MG: 20 TABLET ORAL at 17:40

## 2022-10-27 RX ADMIN — ONDANSETRON HYDROCHLORIDE 4 MG: 2 INJECTION, SOLUTION INTRAMUSCULAR; INTRAVENOUS at 08:17

## 2022-10-27 RX ADMIN — POTASSIUM CHLORIDE 40 MEQ: 1500 TABLET, EXTENDED RELEASE ORAL at 17:39

## 2022-10-27 RX ADMIN — MULTIPLE VITAMINS W/ MINERALS TAB 1 TABLET: TAB at 08:23

## 2022-10-27 RX ADMIN — LEVOTHYROXINE SODIUM 75 MCG: 25 TABLET ORAL at 05:55

## 2022-10-27 RX ADMIN — ESCITALOPRAM OXALATE 10 MG: 10 TABLET ORAL at 08:23

## 2022-10-27 RX ADMIN — DICYCLOMINE HYDROCHLORIDE 20 MG: 20 TABLET ORAL at 05:55

## 2022-10-27 RX ADMIN — DICYCLOMINE HYDROCHLORIDE 20 MG: 20 TABLET ORAL at 20:33

## 2022-10-27 RX ADMIN — Medication 125 MG: at 12:01

## 2022-10-27 RX ADMIN — Medication 125 MG: at 19:41

## 2022-10-27 ASSESSMENT — PAIN SCALES - GENERAL
PAINLEVEL_OUTOF10: 0

## 2022-10-27 NOTE — PROGRESS NOTES
Patient admitted to room 235 from ER. Patient oriented to room, call light, bed rails, phone, lights and bathroom. Patient instructed about the schedule of the day including: vital sign frequency, lab draws, possible tests, frequency of MD and staff rounds, daily weights, I &O's and prescribed diet. Bed alarm deferred patient low fall risk Telemetry box in place, patient aware of placement and reason. Bed locked, in lowest position, side rails up 2/4, call light within reach. Recliner Assessment:     Patient is able to demonstrate the ability to move from a reclining position to an upright position within the recliner. Bedside Mobility Assessment Tool (BMAT):     Assessment Level 1- Sit and Shake    1. From a semi-reclined position, ask patient to sit up and rotate to a seated position at the side of the bed. Can use the bedrail. 2. Ask patient to reach out and grab your hand and shake making sure patient reaches across his/her midline. Pass- Patient is able to come to a seated position, maintain core strength. Maintains seated balance while reaching across midline. Move on to Assessment Level 2. Assessment Level 2- Stretch and Point   1. With patient in seated position at the side of the bed, have patient place both feet on the floor (or stool) with knees no higher than hips. 2. Ask patient to stretch one leg and straighten the knee, then bend the ankle/flex and point the toes. If appropriate, repeat with the other leg. Pass- Patient is able to demonstrate appropriate quad strength on intended weight bearing limb(s). Move onto Assessment Level 3. Assessment Level 3- Stand   1. Ask patient to elevate off the bed or chair (seated to standing) using an assistive device (cane, bedrail). 2. Patient should be able to raise buttocks off be and hold for a count of five. May repeat once.    Pass- Patient maintains standing stability for at least 5 seconds, proceed to assessment level 4.    Assessment Level 4- Walk   1. Ask patient to march in place at bedside. 2. Then ask patient to advance step and return each foot. Some medical conditions may render a patient from stepping backwards, use your best clinical judgement. Pass- Patient demonstrates balance while shifting weight and ability to step, takes independent steps, does not use assistive device patient is MOBILITY LEVEL 4. Mobility Level- 4        4 Eyes Skin Assessment     The patient is being assess for   Admission    I agree that 2 RN's have performed a thorough Head to Toe Skin Assessment on the patient. ALL assessment sites listed below have been assessed. Areas assessed for pressure by both nurses:   [x]   Head, Face, and Ears   [x]   Shoulders, Back, and Chest, Abdomen  [x]   Arms, Elbows, and Hands   [x]   Coccyx, Sacrum, and Ischium  [x]   Legs, Feet, and Heels      Scattered bruising noted from previous venipunctures. Skin Assessed Under all Medical Devices by both nurses:  N/a                All Mepilex Borders were peeled back and area peeked at by both nurses:  No: n/a   Please list where Mepilex Borders are located:  n/a              **SHARE this note so that the co-signing nurse is able to place an eSignature**    Co-signer eSignature: Electronically signed by Timbo Ray RN on 10/27/22 at 2:25 AM EDT    Does the Patient have Skin Breakdown related to pressure?   No              Arturo Prevention initiated:  NA   Wound Care Orders initiated:  NA      [facility] nurse consulted for Pressure Injury (Stage 3,4, Unstageable, DTI, NWPT, Complex wounds)and New or Established Ostomies:  NA      Primary Nurse eSignature: Electronically signed by Denise Link RN on 10/27/22 at 1:26 AM EDT

## 2022-10-27 NOTE — ED NOTES
Chief Complaint   Patient presents with    Diarrhea     States diarrhea present for 6 weeks after being hospitalized for pneumonia. States she was treated for c-diff and still the diarrhea continues. States today she feels weak and nauseated. MEWS Score: 1/ Level of Consciousness: Alert (0)    Vitals:    10/26/22 1731 10/26/22 1802 10/26/22 1832 10/26/22 1938   BP: (!) 115/55 119/67 (!) 111/55 (!) 110/47   Pulse: 77 84 82 85   Resp: 19 24 17 22   Temp:       TempSrc:       SpO2: 93%   97%   Weight:              Allergies   Allergen Reactions    Codeine Other (See Comments)     Skin crawls       Current Facility-Administered Medications   Medication Dose Route Frequency Provider Last Rate Last Admin    metronidazole (FLAGYL) 500 mg in 0.9% NaCl 100 mL IVPB premix  500 mg IntraVENous Once Nicole Pennington PA-C         Current Outpatient Medications   Medication Sig Dispense Refill    amLODIPine (NORVASC) 2.5 MG tablet       dicyclomine (BENTYL) 20 MG tablet Take 20 mg by mouth in the morning and 20 mg at noon and 20 mg in the evening and 20 mg before bedtime.  furosemide (LASIX) 20 MG tablet       montelukast (SINGULAIR) 10 MG tablet TAKE 1 TABLET NIGHTLY 90 tablet 3    simvastatin (ZOCOR) 20 MG tablet TAKE 1 TABLET NIGHTLY 90 tablet 1    levothyroxine (SYNTHROID) 75 MCG tablet TAKE 1 TABLET DAILY 90 tablet 3    ibuprofen (ADVIL;MOTRIN) 600 MG tablet Take 1 tablet by mouth every 8 hours as needed for Pain 30 tablet 0    losartan-hydrochlorothiazide (HYZAAR) 100-25 MG per tablet TAKE 1 TABLET DAILY 90 tablet 3    escitalopram (LEXAPRO) 10 MG tablet TAKE 1 TABLET DAILY 90 tablet 3    Multiple Vitamins-Minerals (WOMENS 50+ MULTI VITAMIN/MIN) TABS Take by mouth daily      omeprazole (PRILOSEC) 20 MG delayed release capsule Take 20 mg by mouth daily      aspirin 81 MG tablet Take 81 mg by mouth daily        List of medications patient is currently taking is complete.   Source of medications in list are per patient. Patient Active Problem List   Diagnosis    Sarcoid    Dyspnea    HTN (hypertension)    GERD (gastroesophageal reflux disease)    Acute pain of left shoulder    Rotator cuff impingement syndrome of left shoulder    Neoplasia    Acromioclavicular joint arthritis    Acquired hypothyroidism    Essential tremor    Status post arthroscopy of left shoulder    Degenerative lumbar spinal stenosis    Colitis                     Vitals:    10/26/22 1731 10/26/22 1802 10/26/22 1832 10/26/22 1938   BP: (!) 115/55 119/67 (!) 111/55 (!) 110/47   Pulse: 77 84 82 85   Resp: 19 24 17 22   Temp:       TempSrc:       SpO2: 93%   97%   Weight:              -----------------------------------------------------------------------------------------    Pt was updated about admission. **To be filled out after report is complete*    Bed assignment: 235    Report called to Jennifer* on *. Pertinent labs, allergies, medications and conditions were reviewed. Present Skin issues include n/a. Patient belongings that will be going with the patient during admission include . Emergency contact * was notified of admission.      Electronically signed by Louis Joshi RN on 10/26/22 at 8:39 PM EDT       Louis Joshi RN  10/26/22 2039

## 2022-10-27 NOTE — ACP (ADVANCE CARE PLANNING)
Advance Care Planning     General Advance Care Planning (ACP) Conversation    Date of Conversation: 10/26/2022  Conducted with: Patient with Decision Making Capacity    Healthcare Decision Maker:    Primary Decision Maker: Demarcus Mike - [phone]  Click here to complete Healthcare Decision Makers including selection of the Healthcare Decision Maker Relationship (ie \"Primary\"). Today we documented Decision Maker(s) consistent with Legal Next of Kin hierarchy. Content/Action Overview:   Has ACP document(s) on file - reflects the patient's care preferences  Reviewed DNR/DNI and patient elects Full Code (Attempt Resuscitation)      Length of Voluntary ACP Conversation in minutes:  <16 minutes (Non-Billable)    Reji Rater, RN

## 2022-10-27 NOTE — PROGRESS NOTES
Progress Note    Admit Date:  10/26/2022    Presents with diarrhea, abdominal cramping and recent c-diff and PNA. She did test negative for cdiff post treatment. Admitted with sepsis, proctocolitis and cd-ff noted to be indeterminate. Admitted for further care, GI consulted. Subjective:  Ms. Ad Ayala stated she is feeling better today. Frances 6 BM since midnight, loose. She stated she only has abdominal pain when she is having BM. Objective:   Patient Vitals for the past 4 hrs:   BP Temp Temp src Pulse Resp SpO2   10/27/22 1244 117/61 98 °F (36.7 °C) Oral 71 18 95 %        Intake/Output Summary (Last 24 hours) at 10/27/2022 1434  Last data filed at 10/27/2022 0603  Gross per 24 hour   Intake 3288.31 ml   Output --   Net 3288.31 ml       Physical Exam:    Gen: No distress. Alert. Eyes: PERRL. No sclera icterus. No conjunctival injection. ENT: No discharge. Pharynx clear. Neck: No JVD. Trachea midline. Resp: No accessory muscle use. No crackles. No wheezes. No rhonchi. CV: Regular rate. Regular rhythm. No murmur. No rub. No edema. Capillary Refill: Brisk,< 3 seconds   Peripheral Pulses: +2 palpable, equal bilaterally   GI: Non-tender. Non-distended. Hyperactive bowel sounds. Skin: Warm and dry. No nodule on exposed extremities. No rash on exposed extremities. M/S: No cyanosis. No joint deformity. No clubbing. Neuro: Awake. Grossly nonfocal    Psych: Oriented x 3. No anxiety or agitation.         Scheduled Meds:   dicyclomine  20 mg Oral 4x Daily AC & HS    escitalopram  10 mg Oral Daily    levothyroxine  75 mcg Oral Daily    therapeutic multivitamin-minerals  1 tablet Oral Daily    montelukast  10 mg Oral Nightly    pantoprazole  40 mg Oral QAM AC    atorvastatin  10 mg Oral Nightly    saccharomyces boulardii  250 mg Oral BID    sodium chloride flush  5-40 mL IntraVENous 2 times per day    ciprofloxacin  400 mg IntraVENous Q12H    metroNIDAZOLE  500 mg IntraVENous Q8H    vancomycin  125 mg Oral 4 times per day       Continuous Infusions:   sodium chloride      sodium chloride 150 mL/hr at 10/27/22 1407       PRN Meds:  witch hazel-glycerin, morphine, sodium chloride flush, sodium chloride, acetaminophen **OR** acetaminophen, potassium chloride **OR** potassium alternative oral replacement **OR** potassium chloride, magnesium sulfate, ondansetron, prochlorperazine      Data:  CBC:   Recent Labs     10/26/22  1540 10/27/22  0507 10/27/22  1207   WBC 18.8* 12.8*  --    HGB 14.1 11.8* 12.2   HCT 40.7 35.2* 36.7   MCV 93.8 95.7  --    * 426  --      BMP:   Recent Labs     10/26/22  1540 10/27/22  0508    138   K 3.1* 3.4*   CL 93* 102   CO2 30 28   BUN 15 10   CREATININE 1.0 0.8     LIVER PROFILE:   Recent Labs     10/26/22  1540 10/27/22  0508   AST 23 15   ALT 32 21   LIPASE 41.0  --    BILITOT 0.3 <0.2   ALKPHOS 95 72     PT/INR: No results for input(s): PROTIME, INR in the last 72 hours. CULTURES  COVID/Influenza: not detected      Stool GI bacterial  C-Diff- indeterminate      RADIOLOGY  CT ABDOMEN PELVIS W IV CONTRAST Additional Contrast? None   Final Result   1. Thickening and mild adjacent inflammatory changes involving the sigmoid   colon and rectum consistent with proctocolitis. 2. Colonic diverticulosis. 3. Small hiatal hernia. XR CHEST PORTABLE   Final Result   Clear lungs. Assessment/Plan:    Sepsis  - (RR, RR; lactic nml). - Likely 2/2 #below  - IV metronidazole and cipro, PO vanco.    - monitor on telemetry   - IVF  - f/u Cultures      Proctocolitis and persistent diarrhea x6 wks. Indeterminate CDIFF  Possible GI bleed  -Guaiac +    - Hemoglobin  14.1--11.8--12.2, monitor q 8 hours   - Hold home ASA. - Likely representing persistent c diff colitis (recent confirmed case, completed course of oral vanco).    - C dif lab indeterminate, lab sent confirmation to [facility].   - Check GI PCR.--pending     -  ml--decrease to 100 ml  - GI consulted   - antibiotics as above. - If c diff testing does turn out positive may be a candidate for stool transplant. Ordered probiotic. - C-Diff precautions     Abdominal cramping,   - low dose morphine, scheduled  Bentyl.    - stated improved, only happening with BMs  - monitor     Hypokalemia,   -3.1--3.4-replace today  -Replacement protocol ordered    Hypomagnesemia,   -1.5, replaced---2.0  - Replacement protocol. Hypothyroidism,   -continue home levothyroxine, check TSH.   - TSH 5.17, T4 free 1.5    HTN  -mildly low DBP's  - suspect 2/2 volume depletion.    -Will hold Cozaar, amlodipine, Lasix    Recent hospital admission with possible CHF at [facility]  - pt stated her BNP was elevated at that time and started on Lasix  - she has outpatient echo scheduled  - appears compensated at this time, holding Lasix  - decrease IVF at this time and monitor   - daily weights, low sodium diet       GERD  - Continue PPI        HLD  - Continue statin      DVT Prophylaxis: scds  Diet: ADULT DIET; Full Liquid;  Low Fiber  Code Status: Full Code      GAURI Napoles - CNP

## 2022-10-27 NOTE — CARE COORDINATION
Case Management Assessment  Initial Evaluation      Patient Name: Javy Mcintyre  YOB: 1950  Diagnosis: Proctocolitis [K52.9]  Hypokalemia [E87.6]  Hypomagnesemia [E83.42]  Colitis [K52.9]  Positive occult stool blood test [R19.5]  Infectious diarrhea [A09]  Leukocytosis, unspecified type [D72.829]  Date / Time: 10/26/2022  3:15 PM    Admission status/Date:10/26/2022  Chart Reviewed: Yes      Patient Interviewed: Yes   Family Interviewed:  No      Hospitalization in the last 30 days:  No      Health Care Decision Maker :   Primary Decision Maker:  Crystal Brooks [facility] [phone]    (CM - must 1st enter selection under Navigator - emergency contact- Health Care Decision Maker Relationship and pick relationship)   Who do you trust or have selected to make healthcare decisions for you      Met with: pt  Interview conducted  (bedside/phone): bedside    Current PCP: Nichole Espinoza [address] Medicare  Precert required for SNF : Y, N          3 night stay required - Y, N    ADLS  Support Systems/Care Needs: Spouse/Significant Other, Family Members  Transportation: self    Meal Preparation: self    Housing  Living Arrangements: 2 story alone  Steps: 3  Intent for return to present living arrangements: Yes  Identified Issues: Dejuan Crandall  Active with Home Health Care : No Agency:(Services)  Type of Home Care Services: None  Passport/Waiver : No  :                      Phone Number:    Passport/Waiver Services: n/a          Durable Medical Equiptment   DME Provider: n/a  Equipment: n/a  Walker___Cane___RTS___ BSC___Shower Chair___Hospital Bed___W/C____Other________  02 at ____Liter(s)---wears(frequency)_______ HHN ___ CPAP___ BiPap___   N/A____      Home O2 Use :  No    If No for home O2---if presently on O2 during hospitalization:  No  if yes CM to follow for potential DC O2 need  Informed of need for care provider to bring portable home O2 tank on day of discharge for nursing to connect prior to leaving:   No  Verbalized agreement/Understanding:   No    Community Service Affiliation  Dialysis:  No    Agency:  Location:  Dialysis Schedule:  Phone:   Fax: Other Community Services: (ex:PT/OT,Mental Health,Wound Clinic, [address])  Pt sees [address] for Epidural Steroid Injection and back pain    DISCHARGE PLAN: Explained Case Management role/services. Reviewed chart. Role of discharge planner explained and patient verbalized understanding. Pt is form a 2 story alone and plans to return. Pt states she is self sufficient. Pt states she sees [address] for Epidural Steroid Injection and back pain. CM offered Home Care (HC/HHC) and pt declines. CM is not following pt. If CM is needed, please contact CM.

## 2022-10-27 NOTE — PROGRESS NOTES
Transferred care to [facility]. Bedside face to  face report given, No needs at this time.

## 2022-10-27 NOTE — CONSULTS
Gastroenterology Consult Note    Patient:   Loraine Smith   :    1950   Facility:   [facility]  Referring/PCP: Robb Soler, APRN - CNP  Date:     10/27/2022  Consultant:   Kiley Saucedo PA-C      Chief Complaint   Patient presents with    Diarrhea     States diarrhea present for 6 weeks after being hospitalized for pneumonia. States she was treated for c-diff and still the diarrhea continues. States today she feels weak and nauseated. History of Present illness     70-year-old female with a history of HLD, HTN, depression, CHF, chronic back pain, hypothyroidism, and sarcoidosis presented to the ED with diarrhea. She was admitted at [facility] on  with pneumonia and diverticulosis. One week later after d/c she tested positive for C. difficile. She was treated with p.o. vancomycin x10 days. On Friday she developed worsening diarrhea. Patient reports C. difficile stool test from PCP was negative. She was started on Bentyl 4 times daily 2 days ago. She admits to diarrhea characterized as 7x BMs per day on average. She admits to nausea, decreased appetite, and weight loss of 12 to 14 pounds over the last month. She denies melena, rectal bleeding, vomiting, dysphagia, heartburn, abdominal pain, cramping, and bloating. She admits to family history of colon polyps in her sisters. She claims her last colonoscopy in [facility] approximately 5 years ago was normal.  GI was consulted for evaluation of colitis. She is occult positive. CT abdomen and pelvis shows thickening and mild adjacent inflammatory changes involving the sigmoid colon and rectum consistent with proctocolitis, colonic diverticulosis, and small hiatal hernia.       Past Medical History:   Diagnosis Date    Acromioclavicular joint arthritis 03/15/2018    CHF (congestive heart failure) (HCC)     Chronic back pain     Depression     w/Anxiety    HTN (hypertension)     Hyperlipidemia     Hypothyroid     Macular degeneration, age related     Sarcoidosis      Past Surgical History:   Procedure Laterality Date    BRONCHOSCOPY       SECTION      LUNG BIOPSY      SHOULDER ARTHROSCOPY Left 2020    LEFT SHOULDER VIDEO ARTHROSCOPY, DISTAL CLAVICLE EXCISION, SUBACROMIAL DECOMPRESSION, ROTATOR CUFF REPAIR WITH ROTATION MEDICAL GRAFT performed by Tiffany Combs DO at [address]  Left 2020    LEFT SHOULDER VIDEO ARTHROSCOPY, DISTAL CLAVICLE EXCISION, SUBACROMIAL DECOMPRESSION, ROTATOR CUFF REPAIR WITH ROTATION MEDICAL GRAFT       Social:   Social History     Tobacco Use    Smoking status: Never    Smokeless tobacco: Never   Substance Use Topics    Alcohol use: No     Family:   Family History   Problem Relation Age of Onset    Hypertension Mother     Diabetes Father     Cancer Son         testicular    Asthma Neg Hx     Emphysema Neg Hx     Heart Failure Neg Hx      No current facility-administered medications on file prior to encounter. Current Outpatient Medications on File Prior to Encounter   Medication Sig Dispense Refill    amLODIPine (NORVASC) 2.5 MG tablet       dicyclomine (BENTYL) 20 MG tablet Take 20 mg by mouth in the morning and 20 mg at noon and 20 mg in the evening and 20 mg before bedtime.  (Patient not taking: Reported on 10/26/2022)      furosemide (LASIX) 20 MG tablet       montelukast (SINGULAIR) 10 MG tablet TAKE 1 TABLET NIGHTLY 90 tablet 3    simvastatin (ZOCOR) 20 MG tablet TAKE 1 TABLET NIGHTLY 90 tablet 1    levothyroxine (SYNTHROID) 75 MCG tablet TAKE 1 TABLET DAILY 90 tablet 3    ibuprofen (ADVIL;MOTRIN) 600 MG tablet Take 1 tablet by mouth every 8 hours as needed for Pain 30 tablet 0    losartan-hydrochlorothiazide (HYZAAR) 100-25 MG per tablet TAKE 1 TABLET DAILY 90 tablet 3    escitalopram (LEXAPRO) 10 MG tablet TAKE 1 TABLET DAILY 90 tablet 3    Multiple Vitamins-Minerals (WOMENS 50+ MULTI VITAMIN/MIN) TABS Take by mouth daily      omeprazole (PRILOSEC) 20 MG delayed release capsule Take 20 mg by mouth daily      aspirin 81 MG tablet Take 81 mg by mouth daily        Infusions:    sodium chloride      sodium chloride 150 mL/hr at 10/27/22 0603     PRN Medications: witch hazel-glycerin, morphine, sodium chloride flush, sodium chloride, acetaminophen **OR** acetaminophen, potassium chloride **OR** potassium alternative oral replacement **OR** potassium chloride, magnesium sulfate, ondansetron, prochlorperazine  Allergies: Allergies   Allergen Reactions    Codeine Other (See Comments)     Skin crawls       ROS:  Constitutional: negative for chills, fevers and sweats. Positive for fatigue. Eyes: negative for cataracts, icterus and redness  Ears, nose, mouth, throat, and face: negative for epistaxis, hearing loss and sore throat  Respiratory: negative for cough, hemoptysis and sputum  Cardiovascular: negative for chest pain, dyspnea and lower extremity edema  Gastrointestinal: as per HPI  Genitourinary:negative for dysuria, frequency and hematuria  Neurological: negative for coordination problems, dizziness and gait problems  Behavioral/Psych: negative for anxiety, depression and mood swings    Physical Exam   /72   Pulse 69   Temp 97.9 °F (36.6 °C) (Oral)   Resp 16   Ht 5' 4\" (1.626 m)   Wt 178 lb 3.2 oz (80.8 kg)   LMP  (LMP Unknown)   SpO2 93%   BMI 30.59 kg/m²       General appearance: alert, appears stated age, cooperative, and no distress  Head: Normocephalic, without obvious abnormality, atraumatic  Eyes: conjunctivae/corneas clear. PERRL, EOM's intact. Fundi benign.   Neck: supple, symmetrical, trachea midline  Lungs: clear to auscultation bilaterally  Heart: regular rate and rhythm, S1, S2 normal, no murmur, click, rub or gallop  Abdomen: soft, non-tender; bowel sounds normal; no masses,  no organomegaly  Extremities: extremities normal, atraumatic, no cyanosis or edema    Lab and Imaging Review   Labs:  CBC:   Recent Labs     10/26/22  1540 10/27/22  0507   WBC 18.8* 12.8*   HGB 14.1 11.8*   HCT 40.7 35.2*   MCV 93.8 95.7   * 426     BMP:   Recent Labs     10/26/22  1540 10/27/22  0508    138   K 3.1* 3.4*   CL 93* 102   CO2 30 28   BUN 15 10   CREATININE 1.0 0.8     LIVER PROFILE:   Recent Labs     10/26/22  1540 10/27/22  0508   AST 23 15   ALT 32 21   LIPASE 41.0  --    PROT 7.7 5.8*   BILITOT 0.3 <0.2   ALKPHOS 95 72     PT/INR: No results for input(s): INR in the last 72 hours. Invalid input(s): PT      IMAGING:  CT ABDOMEN PELVIS W IV CONTRAST Additional Contrast? None   Final Result   1. Thickening and mild adjacent inflammatory changes involving the sigmoid   colon and rectum consistent with proctocolitis. 2. Colonic diverticulosis. 3. Small hiatal hernia. XR CHEST PORTABLE   Final Result   Clear lungs. Attending Supervising [de-identified] Attestation Statement  The patient is a 67 y.o. female. I have performed a history and physical examination of the patient. I discussed the case with my physician assistant Amanda Tello PA-C    I reviewed the patient's Past Medical History, Past Surgical History, Medications, and Allergies.      Physical Exam:  Vitals:    10/26/22 2115 10/27/22 0337 10/27/22 0815 10/27/22 1244   BP: 130/60 (!) 108/55 122/72 117/61   Pulse: 77 65 69 71   Resp: 18 16 16 18   Temp: 98.2 °F (36.8 °C) 98.6 °F (37 °C) 97.9 °F (36.6 °C) 98 °F (36.7 °C)   TempSrc: Oral Oral Oral Oral   SpO2: 95% 95% 93% 95%   Weight: 178 lb 6.4 oz (80.9 kg) 178 lb 3.2 oz (80.8 kg)     Height: 5' 4\" (1.626 m)          Physical Examination: General appearance - ill-appearing  Mental status - alert, oriented to person, place, and time  Eyes - sclera anicteric  Neck - supple, no significant adenopathy  Chest - not examined  Heart - normal rate and regular rhythm  Abdomen - no rebound tenderness noted  Extremities - no pedal edema noted        Assessment:     66-year-old female with a history of HLD, HTN, depression, CHF, chronic back pain, hypothyroidism, and sarcoidosis admitted with proctocolitis concerning for infectious vs inflammatory colitis. Plan:   Continue supportive care  Monitor and document output  Monitor and replenish electrolytes  Continue broad spectrum antibiotics  Bowel regimen with probiotics daily  Continue Pantoprazole 40 mg qAM   Await stool test results  Advance to low fiber diet as tolerated  Ambulate/Up in bed or chair TID   Will follow  Outpatient colonoscopy in 4-6 weeks upon d/c       Mariam Corona PA-C  12:13 PM 10/27/2022                      70-year-old female with history of hypertension, hyperlipidemia, depression, congestive heart failure, chronic back pain, hypothyroidism, sarcoidosis and recent C. Difficile infection admitted with colitis, likely secondary to recurrent C. Difficile infection but cannot excludeIschemic and inflammatory bowel disease. Continue supportive care. Continue broad-spectrum antibiotics. Check stool test. Await stool C. Difficile PCR. Continue oral vancomycin and probiotics.     Cecilia Sepulveda MD          [phone]  [phone]

## 2022-10-28 LAB
ANION GAP SERPL CALCULATED.3IONS-SCNC: 8 MMOL/L (ref 3–16)
ANISOCYTOSIS: ABNORMAL
BANDED NEUTROPHILS RELATIVE PERCENT: 1 % (ref 0–7)
BASOPHILS ABSOLUTE: 0 K/UL (ref 0–0.2)
BASOPHILS RELATIVE PERCENT: 0 %
BUN BLDV-MCNC: 4 MG/DL (ref 7–20)
CALCIUM SERPL-MCNC: 8.1 MG/DL (ref 8.3–10.6)
CHLORIDE BLD-SCNC: 105 MMOL/L (ref 99–110)
CO2: 23 MMOL/L (ref 21–32)
CREAT SERPL-MCNC: 0.9 MG/DL (ref 0.6–1.2)
EOSINOPHILS ABSOLUTE: 0.3 K/UL (ref 0–0.6)
EOSINOPHILS RELATIVE PERCENT: 3 %
GFR SERPL CREATININE-BSD FRML MDRD: >60 ML/MIN/{1.73_M2}
GLUCOSE BLD-MCNC: 87 MG/DL (ref 70–99)
HCT VFR BLD CALC: 36.8 % (ref 36–48)
HCT VFR BLD CALC: 36.9 % (ref 36–48)
HCT VFR BLD CALC: 37.3 % (ref 36–48)
HEMOGLOBIN: 12.2 G/DL (ref 12–16)
HEMOGLOBIN: 12.3 G/DL (ref 12–16)
HEMOGLOBIN: 12.3 G/DL (ref 12–16)
LYMPHOCYTES ABSOLUTE: 5 K/UL (ref 1–5.1)
LYMPHOCYTES RELATIVE PERCENT: 43 %
MACROCYTES: ABNORMAL
MCH RBC QN AUTO: 32.3 PG (ref 26–34)
MCHC RBC AUTO-ENTMCNC: 33.4 G/DL (ref 31–36)
MCV RBC AUTO: 96.9 FL (ref 80–100)
MONOCYTES ABSOLUTE: 0.9 K/UL (ref 0–1.3)
MONOCYTES RELATIVE PERCENT: 8 %
NEUTROPHILS ABSOLUTE: 5.3 K/UL (ref 1.7–7.7)
NEUTROPHILS RELATIVE PERCENT: 45 %
PDW BLD-RTO: 13.9 % (ref 12.4–15.4)
PLATELET # BLD: 407 K/UL (ref 135–450)
PLATELET SLIDE REVIEW: ADEQUATE
PMV BLD AUTO: 6.6 FL (ref 5–10.5)
POTASSIUM REFLEX MAGNESIUM: 3.9 MMOL/L (ref 3.5–5.1)
RBC # BLD: 3.8 M/UL (ref 4–5.2)
SLIDE REVIEW: ABNORMAL
SODIUM BLD-SCNC: 136 MMOL/L (ref 136–145)
WBC # BLD: 11.6 K/UL (ref 4–11)

## 2022-10-28 PROCEDURE — 2500000003 HC RX 250 WO HCPCS: Performed by: INTERNAL MEDICINE

## 2022-10-28 PROCEDURE — 85018 HEMOGLOBIN: CPT

## 2022-10-28 PROCEDURE — 36415 COLL VENOUS BLD VENIPUNCTURE: CPT

## 2022-10-28 PROCEDURE — 85014 HEMATOCRIT: CPT

## 2022-10-28 PROCEDURE — 6360000002 HC RX W HCPCS: Performed by: INTERNAL MEDICINE

## 2022-10-28 PROCEDURE — 1200000000 HC SEMI PRIVATE

## 2022-10-28 PROCEDURE — 99232 SBSQ HOSP IP/OBS MODERATE 35: CPT | Performed by: INTERNAL MEDICINE

## 2022-10-28 PROCEDURE — 6370000000 HC RX 637 (ALT 250 FOR IP): Performed by: INTERNAL MEDICINE

## 2022-10-28 PROCEDURE — 80048 BASIC METABOLIC PNL TOTAL CA: CPT

## 2022-10-28 PROCEDURE — 85025 COMPLETE CBC W/AUTO DIFF WBC: CPT

## 2022-10-28 RX ORDER — DICYCLOMINE HCL 20 MG
20 TABLET ORAL 4 TIMES DAILY PRN
Status: DISCONTINUED | OUTPATIENT
Start: 2022-10-28 | End: 2022-10-29 | Stop reason: HOSPADM

## 2022-10-28 RX ORDER — METRONIDAZOLE 500 MG/100ML
500 INJECTION, SOLUTION INTRAVENOUS EVERY 8 HOURS
Status: DISCONTINUED | OUTPATIENT
Start: 2022-10-28 | End: 2022-10-29 | Stop reason: HOSPADM

## 2022-10-28 RX ADMIN — Medication 125 MG: at 09:23

## 2022-10-28 RX ADMIN — RDII 250 MG CAPSULE 250 MG: at 23:30

## 2022-10-28 RX ADMIN — CIPROFLOXACIN 400 MG: 2 INJECTION, SOLUTION INTRAVENOUS at 01:10

## 2022-10-28 RX ADMIN — RDII 250 MG CAPSULE 250 MG: at 09:23

## 2022-10-28 RX ADMIN — LEVOTHYROXINE SODIUM 75 MCG: 25 TABLET ORAL at 05:56

## 2022-10-28 RX ADMIN — PANTOPRAZOLE SODIUM 40 MG: 40 TABLET, DELAYED RELEASE ORAL at 05:56

## 2022-10-28 RX ADMIN — DICYCLOMINE HYDROCHLORIDE 20 MG: 20 TABLET ORAL at 12:12

## 2022-10-28 RX ADMIN — METRONIDAZOLE 500 MG: 500 INJECTION, SOLUTION INTRAVENOUS at 01:13

## 2022-10-28 RX ADMIN — Medication 125 MG: at 19:59

## 2022-10-28 RX ADMIN — Medication 125 MG: at 12:12

## 2022-10-28 RX ADMIN — DICYCLOMINE HYDROCHLORIDE 20 MG: 20 TABLET ORAL at 05:56

## 2022-10-28 RX ADMIN — ESCITALOPRAM OXALATE 10 MG: 10 TABLET ORAL at 09:23

## 2022-10-28 RX ADMIN — Medication 125 MG: at 01:57

## 2022-10-28 RX ADMIN — MONTELUKAST SODIUM 10 MG: 10 TABLET, COATED ORAL at 23:30

## 2022-10-28 RX ADMIN — ATORVASTATIN CALCIUM 10 MG: 10 TABLET, FILM COATED ORAL at 23:30

## 2022-10-28 RX ADMIN — Medication 125 MG: at 23:30

## 2022-10-28 RX ADMIN — MULTIPLE VITAMINS W/ MINERALS TAB 1 TABLET: TAB at 09:23

## 2022-10-28 ASSESSMENT — PAIN SCALES - GENERAL: PAINLEVEL_OUTOF10: 0

## 2022-10-28 NOTE — PROGRESS NOTES
PROGRESS NOTE  S:72 yrs Patient  admitted on 10/26/2022 with Proctocolitis [K52.9]  Hypokalemia [E87.6]  Hypomagnesemia [E83.42]  Colitis [K52.9]  Positive occult stool blood test [R19.5]  Infectious diarrhea [A09]  Leukocytosis, unspecified type [D72.829] . Today she feels improved. She passed 5x BMs thus far today, and 6-7x BMs yesterday. She has cramping prior to BMs. She is tolerating diet. Exam:   Vitals:    10/28/22 1540   BP: 125/66   Pulse: 65   Resp: 18   Temp: 98.8 °F (37.1 °C)   SpO2: 96%      General appearance: alert, appears stated age, cooperative, and no distress  HEENT: Neck supple with midline trachea  Neck: supple, symmetrical, trachea midline  Lungs: clear to auscultation bilaterally  Heart: regular rate and rhythm, S1, S2 normal, no murmur, click, rub or gallop  Abdomen: soft, non-tender; bowel sounds normal; no masses,  no organomegaly  Extremities: extremities normal, atraumatic, no cyanosis or edema     Medications: Reviewed    Labs:  CBC:   Recent Labs     10/26/22  1540 10/27/22  0507 10/27/22  1207 10/27/22  2139 10/28/22  0401 10/28/22  1158   WBC 18.8* 12.8*  --   --  11.6*  --    HGB 14.1 11.8*   < > 12.3 12.3 12.3   HCT 40.7 35.2*   < > 36.2 36.9 36.8   MCV 93.8 95.7  --   --  96.9  --    * 426  --   --  407  --     < > = values in this interval not displayed. BMP:   Recent Labs     10/26/22  1540 10/27/22  0508 10/28/22  0401    138 136   K 3.1* 3.4* 3.9   CL 93* 102 105   CO2 30 28 23   BUN 15 10 4*   CREATININE 1.0 0.8 0.9     LIVER PROFILE:   Recent Labs     10/26/22  1540 10/27/22  0508   AST 23 15   ALT 32 21   LIPASE 41.0  --    PROT 7.7 5.8*   BILITOT 0.3 <0.2   ALKPHOS 95 72     PT/INR: No results for input(s): INR in the last 72 hours. Invalid input(s): PT      IMAGING:  CT ABDOMEN PELVIS W IV CONTRAST Additional Contrast? None   Final Result   1.  Thickening and mild adjacent inflammatory changes involving the sigmoid   colon and rectum consistent with proctocolitis. 2. Colonic diverticulosis. 3. Small hiatal hernia. XR CHEST PORTABLE   Final Result   Clear lungs. Attending Supervising [de-identified] Attestation Statement  The patient is a 67 y.o. female. I have performed a history and physical examination of the patient. I discussed the case with my physician assistant Genaro Chen PA-C    I reviewed the patient's Past Medical History, Past Surgical History, Medications, and Allergies. Physical Exam:  Vitals:    10/27/22 2010 10/28/22 0331 10/28/22 0857 10/28/22 1540   BP: 112/60 (!) 101/51 109/64 125/66   Pulse: 71 69 68 65   Resp: 16 16 18 18   Temp: 98.6 °F (37 °C) 99 °F (37.2 °C) 99 °F (37.2 °C) 98.8 °F (37.1 °C)   TempSrc: Oral Oral Oral Oral   SpO2: 96% 97% 97% 96%   Weight:  178 lb 1.6 oz (80.8 kg)     Height:           Physical Examination: General appearance - improved  Mental status - alert, oriented to person, place, and time  Eyes - sclera anicteric  Neck - supple, no significant adenopathy  Chest - not examined  Heart - normal rate and regular rhythm  Abdomen - no rebound tenderness noted  Extremities - no pedal edema noted            Impression: 55-year-old female with a history of HLD, HTN, depression, CHF, chronic back pain, hypothyroidism, and sarcoidosis admitted with proctocolitis concerning for infectious vs inflammatory colitis. Stool tests positive for C diff.       Recommendation:  Continue supportive care  Monitor and document output  Monitor and replenish electrolytes  Continue long-term PO Vancomycin with taper starting with 125 mg QID x 10-14 days   Recommend IV Flagyl 500 mg q8h while inpatient   Bowel regimen with probiotics daily  Continue Pantoprazole 40 mg qAM   Continue low fiber diet as tolerated  Ambulate/Up in bed or chair TID   Ok to d/c from GI standpoint   Outpatient colonoscopy in 4-6 weeks upon d/c       Rubio Patel PA-C  4:28 PM 10/28/2022 66-year-old female with history of hypertension, hyperlipidemia, depression, congestive heart failure, chronic back pain, hypothyroidism, sarcoidosis and recent C. Difficile infection admitted with recurrent C diff infection     Continue supportive care. Continue oral vancomycin and probiotics. She will need prolonged oral vancomycin x 14d with tapered regimen. Advance to low fiber diet. Ambulate 3 times daily. Okay to discharge from GI standpoint.     Ramon Veras MD          [phone]  [phone]

## 2022-10-28 NOTE — CARE COORDINATION
Pt states that she lives in a  2 story alone and plans to return. Pt states she is self sufficient. CM offered Home Care (/[facility]) and pt declined. CM is still not following pt. Pt remains on RA at 97%  CM is not following pt. If CM is needed, please contact CM.

## 2022-10-28 NOTE — PROGRESS NOTES
Progress Note    Admit Date:  10/26/2022    Presents with diarrhea, abdominal cramping and recent c-diff and PNA. She did test negative for cdiff post treatment. Admitted with sepsis, proctocolitis and cd-ff noted to be indeterminate. Admitted for further care, GI consulted. Subjective:  Ms. Terri Guardado stated she is feeling better today    Still has frequent BMs but the diarrhea is not loose  stools - more formed stools  today. stool for C. difficile came back positive;    she denies any abdominal pain    Objective:     Blood pressure 109/64, pulse 68, temperature 99 °F (37.2 °C), temperature source Oral, resp. rate 18, height 5' 4\" (1.626 m), weight 178 lb 1.6 oz (80.8 kg), SpO2 97 %, not currently breastfeeding. Intake/Output Summary (Last 24 hours) at 10/28/2022 1433  Last data filed at 10/28/2022 0857  Gross per 24 hour   Intake 120 ml   Output --   Net 120 ml         Physical Exam:    Gen: No distress. Alert. Eyes: PERRL. No sclera icterus. No conjunctival injection. ENT: No discharge. Pharynx clear. Neck: No JVD. Trachea midline. Resp: No accessory muscle use. No crackles. No wheezes. No rhonchi. CV: Regular rate. Regular rhythm. No murmur. No rub. No edema. Capillary Refill: Brisk,< 3 seconds   Peripheral Pulses: +2 palpable, equal bilaterally   GI: Non-tender. Non-distended. + bowel sounds. Skin: Warm and dry. No nodule on exposed extremities. No rash on exposed extremities. M/S: No cyanosis. No joint deformity. No clubbing. Neuro: Awake. Grossly nonfocal    Psych: Oriented x 3. No anxiety or agitation.         Scheduled Meds:   dicyclomine  20 mg Oral 4x Daily AC & HS    escitalopram  10 mg Oral Daily    levothyroxine  75 mcg Oral Daily    therapeutic multivitamin-minerals  1 tablet Oral Daily    montelukast  10 mg Oral Nightly    pantoprazole  40 mg Oral QAM AC    atorvastatin  10 mg Oral Nightly    saccharomyces boulardii  250 mg Oral BID    sodium chloride flush  5-40 mL IntraVENous 2 times per day    vancomycin  125 mg Oral 4 times per day       Continuous Infusions:   sodium chloride         PRN Meds:  witch hazel-glycerin, morphine, sodium chloride flush, sodium chloride, acetaminophen **OR** acetaminophen, potassium chloride **OR** potassium alternative oral replacement **OR** potassium chloride, magnesium sulfate, ondansetron, prochlorperazine      Data:  CBC:   Recent Labs     10/26/22  1540 10/27/22  0507 10/27/22  1207 10/27/22  2139 10/28/22  0401 10/28/22  1158   WBC 18.8* 12.8*  --   --  11.6*  --    HGB 14.1 11.8*   < > 12.3 12.3 12.3   HCT 40.7 35.2*   < > 36.2 36.9 36.8   MCV 93.8 95.7  --   --  96.9  --    * 426  --   --  407  --     < > = values in this interval not displayed. BMP:   Recent Labs     10/26/22  1540 10/27/22  0508 10/28/22  0401    138 136   K 3.1* 3.4* 3.9   CL 93* 102 105   CO2 30 28 23   BUN 15 10 4*   CREATININE 1.0 0.8 0.9       LIVER PROFILE:   Recent Labs     10/26/22  1540 10/27/22  0508   AST 23 15   ALT 32 21   LIPASE 41.0  --    BILITOT 0.3 <0.2   ALKPHOS 95 72       PT/INR: No results for input(s): PROTIME, INR in the last 72 hours. CULTURES  COVID/Influenza: not detected      Stool GI bacterial  C-Diff- positive      RADIOLOGY  CT ABDOMEN PELVIS W IV CONTRAST Additional Contrast? None   Final Result   1. Thickening and mild adjacent inflammatory changes involving the sigmoid   colon and rectum consistent with proctocolitis. 2. Colonic diverticulosis. 3. Small hiatal hernia. XR CHEST PORTABLE   Final Result   Clear lungs. Assessment/Plan:    Sepsis  - (RR, RR; lactic nml). - Likely 2/2 #below  -Placed on IV antibiotics Cipro and Flagyl and oral vancomycin. Patient was C. difficile positive now. Continue oral Vanco and stop Cipro and Flagyl.  -Continue IV fluid hydration. - monitor on telemetry     Proctocolitis and persistent diarrhea x6 wks.    Possible GI bleed  Recurrent C. difficile colitis  -Guaiac +    - Hemoglobin  14.1--11.8--12.2, monitor  H/h   - Hold home ASA. - Likely representing persistent c diff colitis (recent confirmed case, completed course of oral vanco). - C dif lab indeterminate, lab sent confirmation to [facility].   - Check GI PCR.--C. difficile positive  - GI consulted   -Continue oral vancomycin. May need to add Dificid or rifaximin. may be a candidate for stool transplant.   - Ordered probiotic. - C-Diff precautions     Abdominal cramping,   - low dose morphine, scheduled  Bentyl.    - stated improved, only happening with BMs  - monitor   Pain is improved    Hypokalemia  Hypomagnesemia  - Replacement protocol. Hypothyroidism,   -continue home levothyroxine  - TSH 5.17, T4 free 1.5    HTN  -mildly low DBP's  - suspect 2/2 volume depletion.    -Will hold Cozaar, amlodipine, Lasix    Recent hospital admission with possible CHF at [facility]  - pt stated her BNP was elevated at that time and started on Lasix  - she has outpatient echo scheduled  - appears compensated at this time, holding Lasix  - decrease IVF at this time and monitor   - daily weights, low sodium diet     GERD  - Continue PPI      HLD  - Continue statin      DVT Prophylaxis: scds  Diet: ADULT DIET; Full Liquid;  Low Fiber  Code Status: Full Code    Patient is better;  likely plan on discharge home tomorrow      Kasandra Franz MD

## 2022-10-28 NOTE — PROGRESS NOTES
Pt A&O x4 awake in bed, resting comfortably,  no c/o pain or nausea, pt stated she is still having diarrhea, vitals and shift assessment completed, scheduled medications given, bed locked lowest position, told pt to call for any needs.  Carisa Moss RN

## 2022-10-29 VITALS
BODY MASS INDEX: 30.4 KG/M2 | OXYGEN SATURATION: 98 % | SYSTOLIC BLOOD PRESSURE: 131 MMHG | TEMPERATURE: 98.1 F | RESPIRATION RATE: 16 BRPM | HEART RATE: 90 BPM | HEIGHT: 64 IN | WEIGHT: 178.1 LBS | DIASTOLIC BLOOD PRESSURE: 81 MMHG

## 2022-10-29 PROBLEM — A04.72 C. DIFFICILE COLITIS: Status: ACTIVE | Noted: 2022-10-29

## 2022-10-29 LAB
ANION GAP SERPL CALCULATED.3IONS-SCNC: 9 MMOL/L (ref 3–16)
ANISOCYTOSIS: ABNORMAL
ATYPICAL LYMPHOCYTE RELATIVE PERCENT: 11 % (ref 0–6)
BASOPHILS ABSOLUTE: 0 K/UL (ref 0–0.2)
BASOPHILS RELATIVE PERCENT: 0 %
BUN BLDV-MCNC: 3 MG/DL (ref 7–20)
C DIFF TOXIN/ANTIGEN: NORMAL
CALCIUM SERPL-MCNC: 8.6 MG/DL (ref 8.3–10.6)
CHLORIDE BLD-SCNC: 107 MMOL/L (ref 99–110)
CO2: 27 MMOL/L (ref 21–32)
CREAT SERPL-MCNC: 0.6 MG/DL (ref 0.6–1.2)
EOSINOPHILS ABSOLUTE: 0.4 K/UL (ref 0–0.6)
EOSINOPHILS RELATIVE PERCENT: 4 %
GFR SERPL CREATININE-BSD FRML MDRD: >60 ML/MIN/{1.73_M2}
GLUCOSE BLD-MCNC: 96 MG/DL (ref 70–99)
HCT VFR BLD CALC: 36.6 % (ref 36–48)
HCT VFR BLD CALC: 36.9 % (ref 36–48)
HEMATOLOGY PATH CONSULT: YES
HEMOGLOBIN: 12.1 G/DL (ref 12–16)
HEMOGLOBIN: 12.5 G/DL (ref 12–16)
LYMPHOCYTES ABSOLUTE: 4.4 K/UL (ref 1–5.1)
LYMPHOCYTES RELATIVE PERCENT: 33 %
MCH RBC QN AUTO: 31.4 PG (ref 26–34)
MCHC RBC AUTO-ENTMCNC: 32.9 G/DL (ref 31–36)
MCV RBC AUTO: 95.4 FL (ref 80–100)
MONOCYTES ABSOLUTE: 0.4 K/UL (ref 0–1.3)
MONOCYTES RELATIVE PERCENT: 4 %
NEUTROPHILS ABSOLUTE: 4.8 K/UL (ref 1.7–7.7)
NEUTROPHILS RELATIVE PERCENT: 48 %
PDW BLD-RTO: 14 % (ref 12.4–15.4)
PLATELET # BLD: 432 K/UL (ref 135–450)
PLATELET SLIDE REVIEW: ADEQUATE
PMV BLD AUTO: 6.9 FL (ref 5–10.5)
POTASSIUM REFLEX MAGNESIUM: 4.6 MMOL/L (ref 3.5–5.1)
RBC # BLD: 3.86 M/UL (ref 4–5.2)
SLIDE REVIEW: ABNORMAL
SODIUM BLD-SCNC: 143 MMOL/L (ref 136–145)
TOXIC GRANULATION: PRESENT
VACUOLATED NEUTROPHILS: PRESENT
WBC # BLD: 9.9 K/UL (ref 4–11)

## 2022-10-29 PROCEDURE — 85014 HEMATOCRIT: CPT

## 2022-10-29 PROCEDURE — 80048 BASIC METABOLIC PNL TOTAL CA: CPT

## 2022-10-29 PROCEDURE — 6370000000 HC RX 637 (ALT 250 FOR IP): Performed by: INTERNAL MEDICINE

## 2022-10-29 PROCEDURE — 85025 COMPLETE CBC W/AUTO DIFF WBC: CPT

## 2022-10-29 PROCEDURE — 99238 HOSP IP/OBS DSCHRG MGMT 30/<: CPT | Performed by: INTERNAL MEDICINE

## 2022-10-29 PROCEDURE — 85018 HEMOGLOBIN: CPT

## 2022-10-29 PROCEDURE — 36415 COLL VENOUS BLD VENIPUNCTURE: CPT

## 2022-10-29 RX ORDER — SACCHAROMYCES BOULARDII 250 MG
250 CAPSULE ORAL 2 TIMES DAILY
Qty: 60 CAPSULE | Refills: 1 | Status: SHIPPED | OUTPATIENT
Start: 2022-10-29 | End: 2022-11-28

## 2022-10-29 RX ORDER — SACCHAROMYCES BOULARDII 250 MG
250 CAPSULE ORAL 2 TIMES DAILY
Qty: 60 CAPSULE | Refills: 1 | Status: SHIPPED | OUTPATIENT
Start: 2022-10-29 | End: 2022-10-29 | Stop reason: SDUPTHER

## 2022-10-29 RX ORDER — VANCOMYCIN HYDROCHLORIDE 125 MG/1
CAPSULE ORAL
Qty: 70 CAPSULE | Refills: 0 | Status: SHIPPED | OUTPATIENT
Start: 2022-10-29

## 2022-10-29 RX ORDER — VANCOMYCIN HYDROCHLORIDE 125 MG/1
CAPSULE ORAL
Qty: 70 CAPSULE | Refills: 0 | Status: SHIPPED | OUTPATIENT
Start: 2022-10-29 | End: 2022-10-29 | Stop reason: SDUPTHER

## 2022-10-29 RX ADMIN — MULTIPLE VITAMINS W/ MINERALS TAB 1 TABLET: TAB at 08:18

## 2022-10-29 RX ADMIN — RDII 250 MG CAPSULE 250 MG: at 08:18

## 2022-10-29 RX ADMIN — Medication 125 MG: at 12:14

## 2022-10-29 RX ADMIN — Medication 125 MG: at 06:31

## 2022-10-29 RX ADMIN — ESCITALOPRAM OXALATE 10 MG: 10 TABLET ORAL at 08:18

## 2022-10-29 RX ADMIN — LEVOTHYROXINE SODIUM 75 MCG: 25 TABLET ORAL at 06:31

## 2022-10-29 RX ADMIN — PANTOPRAZOLE SODIUM 40 MG: 40 TABLET, DELAYED RELEASE ORAL at 06:31

## 2022-10-29 NOTE — PROGRESS NOTES
Pt A&Ox4, pt resting comfortably in bed, vss, shift assessment completed, scheduled medications given, no c/o pain or nausea at this time, pt stated she is still having diarrhea but has not had an episode since this afternoon, bed locked, lowest position, bed alarm on, call light within reach, told pt to call for any needs.  Desire Gunderson RN

## 2022-10-29 NOTE — PROGRESS NOTES
D/C instructions provided to the patients she verbalized understanding of all of her patient education. She verbalized when she is going to make follow up appointments. Cardiac monitor discontinued. Patient dressed. She verbalized that she has all of her personal belongings. Patient pushed to the care in a wheel chair. No s/s of distress noted. Patient tolerated transport well. She stood and walked to her car. She was seen being driven from the hospital by her .  Electronically signed by Dilma Avitia RN on 10/29/2022 at 1:47 PM

## 2022-10-29 NOTE — FLOWSHEET NOTE
10/29/22 0231   Vital Signs   Temp 97.9 °F (36.6 °C)   Temp Source Oral   Heart Rate 71   Heart Rate Source Monitor   Resp 18   /76   BP Location Left upper arm   BP Method Automatic   MAP (Calculated) 92   Patient Position Semi fowlers   Level of Consciousness 0   MEWS Score 1   Oxygen Therapy   SpO2 100 %   O2 Device None (Room air)   Pt resting in bed, no acute distress.  Ankit Carlin RN

## 2022-10-29 NOTE — PLAN OF CARE
Problem: Discharge Planning  Goal: Discharge to home or other facility with appropriate resources  Outcome: Adequate for Discharge     Problem: Safety - Adult  Goal: Free from fall injury  10/29/2022 1323 by Nader Farnsworth RN  Outcome: Adequate for Discharge  Flowsheets (Taken 10/29/2022 0409 by Margaret Ayers RN)  Free From Fall Injury: Instruct family/caregiver on patient safety  10/29/2022 0407 by Margaret Ayers RN  Outcome: Progressing     Problem: Skin/Tissue Integrity  Goal: Absence of new skin breakdown  Description: 1. Monitor for areas of redness and/or skin breakdown  2. Assess vascular access sites hourly  3. Every 4-6 hours minimum:  Change oxygen saturation probe site  4. Every 4-6 hours:  If on nasal continuous positive airway pressure, respiratory therapy assess nares and determine need for appliance change or resting period.   10/29/2022 1323 by Nader Farnsworth RN  Outcome: Adequate for Discharge  10/29/2022 0407 by Margaret Ayers RN  Outcome: Progressing     Problem: Pain  Goal: Verbalizes/displays adequate comfort level or baseline comfort level  10/29/2022 1323 by Nader Farnsworth RN  Outcome: Adequate for Discharge  10/29/2022 0407 by Margaret Ayers RN  Outcome: Adequate for Discharge

## 2022-10-29 NOTE — PROGRESS NOTES
PROGRESS NOTE  S:72 yrs Patient  admitted on 10/26/2022 with Proctocolitis [K52.9]  Hypokalemia [E87.6]  Hypomagnesemia [E83.42]  Colitis [K52.9]  Positive occult stool blood test [R19.5]  Infectious diarrhea [A09]  Leukocytosis, unspecified type [D72.829] . Today she feels improved. She passed 5x BMs thus far today, and 6-7x BMs yesterday. She has cramping prior to BMs. She is tolerating diet. Exam:   Vitals:    10/29/22 0819   BP: 131/81   Pulse: 90   Resp: 16   Temp: 98.1 °F (36.7 °C)   SpO2: 98%      General appearance: alert, appears stated age, cooperative, and no distress  HEENT: Neck supple with midline trachea  Neck: supple, symmetrical, trachea midline  Lungs: clear to auscultation bilaterally  Heart: regular rate and rhythm, S1, S2 normal, no murmur, click, rub or gallop  Abdomen: soft, non-tender; bowel sounds normal; no masses,  no organomegaly  Extremities: extremities normal, atraumatic, no cyanosis or edema     Medications: Reviewed    Labs:  CBC:   Recent Labs     10/27/22  0507 10/27/22  1207 10/28/22  0401 10/28/22  1158 10/28/22  1955 10/29/22  0420   WBC 12.8*  --  11.6*  --   --  9.9   HGB 11.8*   < > 12.3 12.3 12.2 12.1   HCT 35.2*   < > 36.9 36.8 37.3 36.9   MCV 95.7  --  96.9  --   --  95.4     --  407  --   --  432    < > = values in this interval not displayed. BMP:   Recent Labs     10/27/22  0508 10/28/22  0401 10/29/22  0420    136 143   K 3.4* 3.9 4.6    105 107   CO2 28 23 27   BUN 10 4* 3*   CREATININE 0.8 0.9 0.6       LIVER PROFILE:   Recent Labs     10/26/22  1540 10/27/22  0508   AST 23 15   ALT 32 21   LIPASE 41.0  --    PROT 7.7 5.8*   BILITOT 0.3 <0.2   ALKPHOS 95 72       PT/INR: No results for input(s): INR in the last 72 hours. Invalid input(s): PT      IMAGING:  CT ABDOMEN PELVIS W IV CONTRAST Additional Contrast? None   Final Result   1.  Thickening and mild adjacent inflammatory changes involving the sigmoid   colon and rectum consistent with proctocolitis. 2. Colonic diverticulosis. 3. Small hiatal hernia. XR CHEST PORTABLE   Final Result   Clear lungs. Attending Supervising [de-identified] Attestation Statement  The patient is a 67 y.o. female. I have performed a history and physical examination of the patient. I discussed the case with my physician assistant Cecilia Glez PA-C    I reviewed the patient's Past Medical History, Past Surgical History, Medications, and Allergies. Physical Exam:  Vitals:    10/28/22 1540 10/28/22 2042 10/29/22 0231 10/29/22 0819   BP: 125/66 132/65 124/76 131/81   Pulse: 65 62 71 90   Resp: 18 18 18 16   Temp: 98.8 °F (37.1 °C) 98.1 °F (36.7 °C) 97.9 °F (36.6 °C) 98.1 °F (36.7 °C)   TempSrc: Oral Oral Oral Oral   SpO2: 96% 97% 100% 98%   Weight:       Height:           Physical Examination: General appearance - improved  Mental status - alert, oriented to person, place, and time  Eyes - sclera anicteric  Neck - supple, no significant adenopathy  Chest - not examined  Heart - normal rate and regular rhythm  Abdomen - no rebound tenderness noted  Extremities - no pedal edema noted            Impression:   59-year-old female with history of hypertension, hyperlipidemia, depression, congestive heart failure, chronic back pain, hypothyroidism, sarcoidosis and recent C. Difficile infection admitted with recurrent C diff infection    Recommendation:  Continue supportive care. Continue oral vancomycin and probiotics. She will need prolonged oral vancomycin x 14d with tapered regimen. Advance to low fiber diet. Ambulate 3 times daily. Okay to discharge from GI standpoint.     Rafa Lemra MD       [phone]) [phone]

## 2022-10-29 NOTE — CARE COORDINATION
DC order noted. Chart reviewed. No dc needs identified. Discharge timeout done with Ángela Cerrato. All discharge needs and concerns addressed.

## 2022-10-30 LAB
C. DIFFICILE TOXIN MOLECULAR: ABNORMAL
CULTURE, BLOOD 2: NORMAL
ORGANISM: ABNORMAL

## 2022-10-31 LAB
BLOOD CULTURE, ROUTINE: ABNORMAL
ORGANISM: ABNORMAL

## 2022-11-03 NOTE — PROGRESS NOTES
[address]  Advanced CHF/Pulmonary Hypertension   Cardiac Evaluation      Angelika Macias  YOB: 1950    Date of Visit:  11/8/22      Chief Complaint   Patient presents with    New Patient    Congestive Heart Failure    Shortness of Breath    Chest Pain     pressure        History of Present Illness:  Angelika Macias is a 67 y.o. female who presents from referral from GAURI Solo CNP for consultation and management of CHF. She has a history of HTN, HLD,    Today, 11/8/2022, she says she was in [facility] in September 2022 fr pneumonia. She was told she had CHF seen in blood work. She was suppose to have an echo but did not want it done at [facility]. She was in [facility] in October for diarrhea and c-diff. She is on antibiotics for another 4 weeks. She has increased shortness of breath and fatigue since September. She has chest pain. She had a stress test a few years ago for palpitations she says this was normal. Her father had hardening of the arteries. She has never smoked. Patient is taking all cardiac medications as prescribed and tolerates them well. Patient denies current edema, palpitations, dizziness or syncope.       Allergies   Allergen Reactions    Codeine Other (See Comments)     Skin crawls     Current Outpatient Medications   Medication Sig Dispense Refill    vancomycin (VANCOCIN) 125 MG capsule Take 1 tab QID for 7 days ; then 1 TID for 7 days ; then 1 BID for 7 days; then 1 daily for 7 days 70 capsule 0    saccharomyces boulardii (FLORASTOR) 250 MG capsule Take 1 capsule by mouth 2 times daily (Patient taking differently: Take 250 mg by mouth daily) 60 capsule 1    amLODIPine (NORVASC) 2.5 MG tablet Take 2.5 mg by mouth daily      montelukast (SINGULAIR) 10 MG tablet TAKE 1 TABLET NIGHTLY 90 tablet 3    simvastatin (ZOCOR) 20 MG tablet TAKE 1 TABLET NIGHTLY 90 tablet 1    levothyroxine (SYNTHROID) 75 MCG tablet TAKE 1 TABLET DAILY 90 tablet 3    losartan-hydrochlorothiazide (HYZAAR) 100-25 MG per tablet TAKE 1 TABLET DAILY 90 tablet 3    escitalopram (LEXAPRO) 10 MG tablet TAKE 1 TABLET DAILY 90 tablet 3    Multiple Vitamins-Minerals (WOMENS 50+ MULTI VITAMIN/MIN) TABS Take by mouth daily      omeprazole (PRILOSEC) 20 MG delayed release capsule Take 20 mg by mouth daily      aspirin 81 MG tablet Take 81 mg by mouth daily       No current facility-administered medications for this visit. Past Medical History:   Diagnosis Date    Acromioclavicular joint arthritis 03/15/2018    CHF (congestive heart failure) (HCC)     Chronic back pain     Depression     w/Anxiety    HTN (hypertension)     Hyperlipidemia     Hypothyroid     Macular degeneration, age related     Sarcoidosis      Past Surgical History:   Procedure Laterality Date    BRONCHOSCOPY       SECTION      LUNG BIOPSY      SHOULDER ARTHROSCOPY Left 2020    LEFT SHOULDER VIDEO ARTHROSCOPY, DISTAL CLAVICLE EXCISION, SUBACROMIAL DECOMPRESSION, ROTATOR CUFF REPAIR WITH ROTATION MEDICAL GRAFT performed by Christiana Cabello DO at [facility] [facility] Left 2020    LEFT SHOULDER VIDEO ARTHROSCOPY, DISTAL CLAVICLE EXCISION, SUBACROMIAL DECOMPRESSION, ROTATOR CUFF REPAIR WITH ROTATION MEDICAL GRAFT     Family History   Problem Relation Age of Onset    Hypertension Mother     Diabetes Father     Cancer Son         testicular    Asthma Neg Hx     Emphysema Neg Hx     Heart Failure Neg Hx      Social History     Socioeconomic History    Marital status:       Spouse name: Not on file    Number of children: Not on file    Years of education: Not on file    Highest education level: Not on file   Occupational History    Not on file   Tobacco Use    Smoking status: Never    Smokeless tobacco: Never   Vaping Use    Vaping Use: Never used   Substance and Sexual Activity    Alcohol use: No    Drug use: No    Sexual activity: Not Currently   Other Topics Concern    Not on file   Social History Narrative    Not on file     Social Determinants of Health     Financial Resource Strain: Not on file   Food Insecurity: Not on file   Transportation Needs: Not on file   Physical Activity: Not on file   Stress: Not on file   Social Connections: Not on file   Intimate Partner Violence: Not on file   Housing Stability: Not on file       Review of Systems:   Constitutional: there has been no unanticipated weight loss. There's been no change in energy level, sleep pattern, or activity level. Eyes: No visual changes or diplopia. No scleral icterus. ENT: No Headaches, hearing loss or vertigo. No mouth sores or sore throat. Cardiovascular: Reviewed in HPI  Respiratory: No cough or wheezing, no sputum production. No hematemesis. Gastrointestinal: No abdominal pain, appetite loss, blood in stools. No change in bowel or bladder habits. Genitourinary: No dysuria, trouble voiding, or hematuria. Musculoskeletal:  No gait disturbance, weakness or joint complaints. Integumentary: No rash or pruritis. Neurological: No headache, diplopia, change in muscle strength, numbness or tingling. No change in gait, balance, coordination, mood, affect, memory, mentation, behavior. Psychiatric: No anxiety, no depression. Endocrine: No malaise, fatigue or temperature intolerance. No excessive thirst, fluid intake, or urination. No tremor. Hematologic/Lymphatic: No abnormal bruising or bleeding, blood clots or swollen lymph nodes. Allergic/Immunologic: No nasal congestion or hives. Physical Examination:    Vitals:    11/08/22 0921   BP: 110/70   Pulse: 73   SpO2: 96%   Weight: 179 lb (81.2 kg)   Height: 5' 4\" (1.626 m)     Body mass index is 30.73 kg/m².      Wt Readings from Last 3 Encounters:   11/08/22 179 lb (81.2 kg)   10/28/22 178 lb 1.6 oz (80.8 kg)   08/03/21 185 lb 3.2 oz (84 kg)     BP Readings from Last 3 Encounters:   11/08/22 110/70   10/29/22 131/81   08/03/21 132/78     Constitutional and General Appearance:   WD/WN in NAD  HEENT:  NC/AT  NUSRAT  No problems with hearing  Skin:  Warm, dry  Respiratory:  Normal excursion and expansion without use of accessory muscles  Resp Auscultation: Normal breath sounds without dullness  Cardiovascular: The apical impulses not displaced  Heart tones are crisp and normal  Cervical veins are not engorged  The carotid upstroke is normal in amplitude and contour without delay or bruit  JVP less than 8 cm H2O  RRR with nl S1 and S2 without m,r,g  Peripheral pulses are symmetrical and full  There is no clubbing, cyanosis of the extremities. No edema  Femoral Arteries: 2+ and equal  Pedal Pulses: 2+ and equal   Neck:  No thyromegaly  Abdomen:  No masses or tenderness  Liver/Spleen: No Abnormalities Noted  Neurological/Psychiatric:  Alert and oriented in all spheres  Moves all extremities well  Exhibits normal gait balance and coordination  No abnormalities of mood, affect, memory, mentation, or behavior are noted    Chest XR 10/26/2022  No pleural effusion or pneumothorax. Cardiomediastinal silhouette is enlarged. Degenerative disease of the visualized osseous structures. Echo stress test 4/4/2017  Summary   Normal stress echocardiogram.    Labs were reviewed including labs from other hospital systems through [facility]. Cardiac testing was reviewed including echos, nuclear scans, cardiac catheterization, including from other hospital systems through [facility]. Assessment:    1. Chronic heart failure, unspecified heart failure type ([address]    2. Other chest pain    3. SOB (shortness of breath)    4. Essential hypertension, benign    5. Pure hypercholesterolemia         Plan:  Sleep medicine referral for sleep apnea  Exercise Stress Echocardiogram to assess heart function and arteries  Call [phone] to schedule   3. Labs the day of stress test: fasting Lipids, BMP, BNP  4.  Follow with me in January     This note was scribed in the presence of Ana Cristina Veras Lorne Spencer MD by Anirudh Mahan RN    The scribe's documentation has been prepared under my direction and personally reviewed by me in its entirety. I confirm that the note above accurately reflects all work, treatment, procedures, and medical decision making performed by me. Time Based Itemization  A total of 60 minutes was spent on today's patient encounter. If applicable, non-patient-facing activities:  ( x)Preparing to see the patient and reviewing records  ( ) Individual interpretation of results  ( ) Discussion or coordination of care with other health care professionals  ( x) Ordering of unique tests, medications, or procedures  ( x) Documentation within the EHR      I appreciate the opportunity of cooperating in the care of this patient.     Allie Benson M.D., [facility] [place]

## 2022-11-08 ENCOUNTER — OFFICE VISIT (OUTPATIENT)
Dept: CARDIOLOGY CLINIC | Age: 72
End: 2022-11-08
Payer: MEDICARE

## 2022-11-08 VITALS
DIASTOLIC BLOOD PRESSURE: 70 MMHG | WEIGHT: 179 LBS | HEIGHT: 64 IN | BODY MASS INDEX: 30.56 KG/M2 | HEART RATE: 73 BPM | OXYGEN SATURATION: 96 % | SYSTOLIC BLOOD PRESSURE: 110 MMHG

## 2022-11-08 DIAGNOSIS — R06.02 SOB (SHORTNESS OF BREATH): ICD-10-CM

## 2022-11-08 DIAGNOSIS — E78.00 PURE HYPERCHOLESTEROLEMIA: ICD-10-CM

## 2022-11-08 DIAGNOSIS — I10 ESSENTIAL HYPERTENSION, BENIGN: ICD-10-CM

## 2022-11-08 DIAGNOSIS — I50.9 CHRONIC HEART FAILURE, UNSPECIFIED HEART FAILURE TYPE (HCC): Primary | ICD-10-CM

## 2022-11-08 DIAGNOSIS — R07.89 OTHER CHEST PAIN: ICD-10-CM

## 2022-11-08 PROCEDURE — 3074F SYST BP LT 130 MM HG: CPT | Performed by: INTERNAL MEDICINE

## 2022-11-08 PROCEDURE — 3078F DIAST BP <80 MM HG: CPT | Performed by: INTERNAL MEDICINE

## 2022-11-08 PROCEDURE — 99204 OFFICE O/P NEW MOD 45 MIN: CPT | Performed by: INTERNAL MEDICINE

## 2022-11-08 NOTE — PATIENT INSTRUCTIONS
Plan:  Sleep medicine referral for sleep apnea  Exercise Stress Echocardiogram to assess heart function and arteries  Call [phone] to schedule   3. Labs the day of stress test: fasting Lipids, BMP, BNP  4.  Follow with me in January

## 2022-11-15 ENCOUNTER — HOSPITAL ENCOUNTER (OUTPATIENT)
Dept: NON INVASIVE DIAGNOSTICS | Age: 72
Discharge: HOME | End: 2022-11-15
Payer: MEDICARE

## 2022-11-15 ENCOUNTER — HOSPITAL ENCOUNTER (OUTPATIENT)
Age: 72
Discharge: HOME | End: 2022-11-15
Payer: MEDICARE

## 2022-11-15 DIAGNOSIS — R06.02 SOB (SHORTNESS OF BREATH): ICD-10-CM

## 2022-11-15 DIAGNOSIS — I50.9 CHRONIC HEART FAILURE, UNSPECIFIED HEART FAILURE TYPE (HCC): ICD-10-CM

## 2022-11-15 DIAGNOSIS — E78.00 PURE HYPERCHOLESTEROLEMIA: ICD-10-CM

## 2022-11-15 LAB
ANION GAP SERPL CALCULATED.3IONS-SCNC: 18 MMOL/L (ref 3–16)
BUN BLDV-MCNC: 16 MG/DL (ref 7–20)
CALCIUM SERPL-MCNC: 9.9 MG/DL (ref 8.3–10.6)
CHLORIDE BLD-SCNC: 100 MMOL/L (ref 99–110)
CHOLESTEROL, FASTING: 173 MG/DL (ref 0–199)
CO2: 23 MMOL/L (ref 21–32)
CREAT SERPL-MCNC: 0.6 MG/DL (ref 0.6–1.2)
GFR SERPL CREATININE-BSD FRML MDRD: >60 ML/MIN/{1.73_M2}
GLUCOSE BLD-MCNC: 109 MG/DL (ref 70–99)
HDLC SERPL-MCNC: 47 MG/DL (ref 40–60)
LDL CHOLESTEROL CALCULATED: 93 MG/DL
LV EF: 60 %
LVEF MODALITY: NORMAL
POTASSIUM SERPL-SCNC: 4.4 MMOL/L (ref 3.5–5.1)
PRO-BNP: 68 PG/ML (ref 0–124)
SODIUM BLD-SCNC: 141 MMOL/L (ref 136–145)
TRIGLYCERIDE, FASTING: 163 MG/DL (ref 0–150)
VLDLC SERPL CALC-MCNC: 33 MG/DL

## 2022-11-15 PROCEDURE — 83880 ASSAY OF NATRIURETIC PEPTIDE: CPT

## 2022-11-15 PROCEDURE — 80048 BASIC METABOLIC PNL TOTAL CA: CPT

## 2022-11-15 PROCEDURE — 36415 COLL VENOUS BLD VENIPUNCTURE: CPT

## 2022-11-15 PROCEDURE — 80061 LIPID PANEL: CPT

## 2022-11-15 PROCEDURE — 93351 STRESS TTE COMPLETE: CPT

## 2023-01-13 NOTE — PROGRESS NOTES
[address]  Advanced CHF/Pulmonary Hypertension   Cardiac Evaluation      Yaneth Lloyd  YOB: 1950    Date of Visit:  1/17/23      Chief Complaint   Patient presents with    Follow-up    Congestive Heart Failure    Chest Pain          History of Present Illness:  Yaneth Lloyd is a 67 y.o. female who presents from referral from [facility] for consultation and management of CHF. She has a history of HTN, HLD,    OV, 11/8/2022, she says she was in [facility] in September 2022 fr pneumonia. She was told she had CHF seen in blood work. She was suppose to have an echo but did not want it done at [facility]. She was in [facility] in October for diarrhea and c-diff. She is on antibiotics for another 4 weeks. She has increased shortness of breath and fatigue since September. She has chest pain. She had a stress test a few years ago for palpitations she says this was normal. Her father had hardening of the arteries. She has never smoked. Echo exercise stress test 11/15/2022 normal.    Today, 1/17/2023, She says she has chest pain. It feels like a \"lightning strike\". This occurred two other times but did not feel the same as the first time. The pain is brief. She had an episode not related to chest pain when her left arm went numb and her hand was tingling. This came and went away on its own. She had carpal tunnel surgery on this same arm. She has shortness of breath all of the time. She denies smoking. She says she had confirmed sarcoidosis with mediastinoscopy in 1998. She has not seen a pulmonologist since 2017. Patient is taking all cardiac medications as prescribed and tolerates them well. Patient denies current edema, chest pain, palpitations, dizziness or syncope.          Allergies   Allergen Reactions    Codeine Other (See Comments)     Skin crawls     Current Outpatient Medications   Medication Sig Dispense Refill    Probiotic Product (PROBIOTIC PO) Take by mouth      amLODIPine (NORVASC) 2.5 MG tablet Take 2.5 mg by mouth daily      montelukast (SINGULAIR) 10 MG tablet TAKE 1 TABLET NIGHTLY 90 tablet 3    simvastatin (ZOCOR) 20 MG tablet TAKE 1 TABLET NIGHTLY 90 tablet 1    levothyroxine (SYNTHROID) 75 MCG tablet TAKE 1 TABLET DAILY 90 tablet 3    losartan-hydrochlorothiazide (HYZAAR) 100-25 MG per tablet TAKE 1 TABLET DAILY 90 tablet 3    escitalopram (LEXAPRO) 10 MG tablet TAKE 1 TABLET DAILY 90 tablet 3    Multiple Vitamins-Minerals (WOMENS 50+ MULTI VITAMIN/MIN) TABS Take by mouth daily      omeprazole (PRILOSEC) 20 MG delayed release capsule Take 20 mg by mouth daily      aspirin 81 MG tablet Take 81 mg by mouth daily       No current facility-administered medications for this visit. Past Medical History:   Diagnosis Date    Acromioclavicular joint arthritis 03/15/2018    CHF (congestive heart failure) (HCC)     Chronic back pain     Depression     w/Anxiety    HTN (hypertension)     Hyperlipidemia     Hypothyroid     Macular degeneration, age related     Sarcoidosis      Past Surgical History:   Procedure Laterality Date    BRONCHOSCOPY       SECTION      LUNG BIOPSY      SHOULDER ARTHROSCOPY Left 2020    LEFT SHOULDER VIDEO ARTHROSCOPY, DISTAL CLAVICLE EXCISION, SUBACROMIAL DECOMPRESSION, ROTATOR CUFF REPAIR WITH ROTATION MEDICAL GRAFT performed by Marcio Chavez DO at [facility] Left 2020    LEFT SHOULDER VIDEO ARTHROSCOPY, DISTAL CLAVICLE EXCISION, SUBACROMIAL DECOMPRESSION, ROTATOR CUFF REPAIR WITH ROTATION MEDICAL GRAFT     Family History   Problem Relation Age of Onset    Hypertension Mother     Diabetes Father     Cancer Son         testicular    Asthma Neg Hx     Emphysema Neg Hx     Heart Failure Neg Hx      Social History     Socioeconomic History    Marital status:       Spouse name: Not on file    Number of children: Not on file    Years of education: Not on file    Highest education level: Not on file   Occupational History    Not on file   Tobacco Use    Smoking status: Never    Smokeless tobacco: Never   Vaping Use    Vaping Use: Never used   Substance and Sexual Activity    Alcohol use: No    Drug use: No    Sexual activity: Not Currently   Other Topics Concern    Not on file   Social History Narrative    Not on file     Social Determinants of Health     Financial Resource Strain: Not on file   Food Insecurity: Not on file   Transportation Needs: Not on file   Physical Activity: Not on file   Stress: Not on file   Social Connections: Not on file   Intimate Partner Violence: Not on file   Housing Stability: Not on file       Review of Systems:   Constitutional: there has been no unanticipated weight loss. There's been no change in energy level, sleep pattern, or activity level. Eyes: No visual changes or diplopia. No scleral icterus. ENT: No Headaches, hearing loss or vertigo. No mouth sores or sore throat. Cardiovascular: Reviewed in HPI  Respiratory: No cough or wheezing, no sputum production. No hematemesis. Gastrointestinal: No abdominal pain, appetite loss, blood in stools. No change in bowel or bladder habits. Genitourinary: No dysuria, trouble voiding, or hematuria. Musculoskeletal:  No gait disturbance, weakness or joint complaints. Integumentary: No rash or pruritis. Neurological: No headache, diplopia, change in muscle strength, numbness or tingling. No change in gait, balance, coordination, mood, affect, memory, mentation, behavior. Psychiatric: No anxiety, no depression. Endocrine: No malaise, fatigue or temperature intolerance. No excessive thirst, fluid intake, or urination. No tremor. Hematologic/Lymphatic: No abnormal bruising or bleeding, blood clots or swollen lymph nodes. Allergic/Immunologic: No nasal congestion or hives.     Physical Examination:    Vitals:    01/17/23 1345   BP: 110/60   Pulse: 78   SpO2: 95%   Weight: 185 lb 8 oz (84.1 kg) Height: 5' 4\" (1.626 m)       Body mass index is 31.84 kg/m². Wt Readings from Last 3 Encounters:   01/17/23 185 lb 8 oz (84.1 kg)   11/08/22 179 lb (81.2 kg)   10/28/22 178 lb 1.6 oz (80.8 kg)     BP Readings from Last 3 Encounters:   01/17/23 110/60   11/08/22 110/70   10/29/22 131/81     Constitutional and General Appearance:   WD/WN in NAD  HEENT:  NC/AT  NUSRAT  No problems with hearing  Skin:  Warm, dry  Respiratory:  Normal excursion and expansion without use of accessory muscles  Resp Auscultation: Normal breath sounds without dullness  Cardiovascular: The apical impulses not displaced  Heart tones are crisp and normal  Cervical veins are not engorged  The carotid upstroke is normal in amplitude and contour without delay or bruit  JVP less than 8 cm H2O  RRR with nl S1 and S2 without m,r,g  Peripheral pulses are symmetrical and full  There is no clubbing, cyanosis of the extremities. No edema  Femoral Arteries: 2+ and equal  Pedal Pulses: 2+ and equal   Neck:  No thyromegaly  Abdomen:  No masses or tenderness  Liver/Spleen: No Abnormalities Noted  Neurological/Psychiatric:  Alert and oriented in all spheres  Moves all extremities well  Exhibits normal gait balance and coordination  No abnormalities of mood, affect, memory, mentation, or behavior are noted    Chest XR 10/26/2022  No pleural effusion or pneumothorax. Cardiomediastinal silhouette is enlarged. Degenerative disease of the visualized osseous structures. Echo stress test 4/4/2017  Summary   Normal stress echocardiogram.    Stress echo 11/15/2022   Summary   Normal exercise stress ECHO. Labs were reviewed including labs from other hospital systems through [facility]. Cardiac testing was reviewed including echos, nuclear scans, cardiac catheterization, including from other hospital systems through [facility]. Assessment:    1. Other chest pain    2. SOB (shortness of breath)    3. Essential hypertension, benign    4. Pure hypercholesterolemia    5. Pulmonary sarcoidosis ([facility] [address]    6. Sarcoidosis           Plan:  Referral to Dr. Cassy Alarcon with pulmonology at [facility] for sarcoidosis  Labs now: CRP, ESR, BNP  Cardiac MRI for sarcoidosis to assess for inflammation in the heart  Call [phone] to schedule  This must be completed at [facility] and read by Dr. Rachel Aranda  Follow up with me in 3-4 months       This note was scribed in the presence of Mitra Boggs MD by Augusto Pratt RN    The scribe's documentation has been prepared under my direction and personally reviewed by me in its entirety. I confirm that the note above accurately reflects all work, treatment, procedures, and medical decision making performed by me. Time Based Itemization  A total of 40 minutes was spent on today's patient encounter. If applicable, non-patient-facing activities:  ( x)Preparing to see the patient and reviewing records  ( ) Individual interpretation of results  ( ) Discussion or coordination of care with other health care professionals  ( x) Ordering of unique tests, medications, or procedures  ( x) Documentation within the EHR      I appreciate the opportunity of cooperating in the care of this patient.     Audra Bajwa M.D., [facility] [place]

## 2023-01-17 ENCOUNTER — OFFICE VISIT (OUTPATIENT)
Dept: CARDIOLOGY CLINIC | Age: 73
End: 2023-01-17
Payer: MEDICARE

## 2023-01-17 ENCOUNTER — HOSPITAL ENCOUNTER (OUTPATIENT)
Age: 73
Discharge: HOME | End: 2023-01-17
Payer: MEDICARE

## 2023-01-17 VITALS
BODY MASS INDEX: 31.67 KG/M2 | WEIGHT: 185.5 LBS | DIASTOLIC BLOOD PRESSURE: 60 MMHG | OXYGEN SATURATION: 95 % | HEIGHT: 64 IN | HEART RATE: 78 BPM | SYSTOLIC BLOOD PRESSURE: 110 MMHG

## 2023-01-17 DIAGNOSIS — I10 ESSENTIAL HYPERTENSION, BENIGN: ICD-10-CM

## 2023-01-17 DIAGNOSIS — E78.00 PURE HYPERCHOLESTEROLEMIA: ICD-10-CM

## 2023-01-17 DIAGNOSIS — D86.9 SARCOIDOSIS: ICD-10-CM

## 2023-01-17 DIAGNOSIS — R06.02 SOB (SHORTNESS OF BREATH): ICD-10-CM

## 2023-01-17 DIAGNOSIS — R07.89 OTHER CHEST PAIN: Primary | ICD-10-CM

## 2023-01-17 DIAGNOSIS — D86.0 PULMONARY SARCOIDOSIS (HCC): ICD-10-CM

## 2023-01-17 DIAGNOSIS — R07.89 OTHER CHEST PAIN: ICD-10-CM

## 2023-01-17 LAB
C-REACTIVE PROTEIN: <3 MG/L (ref 0–5.1)
PRO-BNP: 24 PG/ML (ref 0–124)
SEDIMENTATION RATE, ERYTHROCYTE: 37 MM/HR (ref 0–30)

## 2023-01-17 PROCEDURE — 86140 C-REACTIVE PROTEIN: CPT

## 2023-01-17 PROCEDURE — 83880 ASSAY OF NATRIURETIC PEPTIDE: CPT

## 2023-01-17 PROCEDURE — 3074F SYST BP LT 130 MM HG: CPT | Performed by: INTERNAL MEDICINE

## 2023-01-17 PROCEDURE — 3078F DIAST BP <80 MM HG: CPT | Performed by: INTERNAL MEDICINE

## 2023-01-17 PROCEDURE — 99215 OFFICE O/P EST HI 40 MIN: CPT | Performed by: INTERNAL MEDICINE

## 2023-01-17 PROCEDURE — 36415 COLL VENOUS BLD VENIPUNCTURE: CPT

## 2023-01-17 PROCEDURE — 1124F ACP DISCUSS-NO DSCNMKR DOCD: CPT | Performed by: INTERNAL MEDICINE

## 2023-01-17 PROCEDURE — 85652 RBC SED RATE AUTOMATED: CPT

## 2023-01-17 NOTE — PATIENT INSTRUCTIONS
Your provider has ordered testing for further evaluation. An order/prescription has been included in your paper work. To schedule outpatient testing, contact Central Scheduling by calling [employer] ([phone]).       Plan:  Referral to Dr. Consuelo Shin with pulmonology at [facility] now: CRP, ESR, BNP  Cardiac MRI for sarcoidosis to assess for inflammation in the heart  Call [phone] to schedule  [facility] Dr. Joel Santacruz  Follow up with me in 3-4 months

## 2023-01-22 ENCOUNTER — TELEPHONE (OUTPATIENT)
Dept: CARDIOLOGY | Age: 73
End: 2023-01-22

## 2023-01-22 NOTE — TELEPHONE ENCOUNTER
I inadvertently \"doned\" her labs. Please call her and let her know that her labs look good and were normal.  No evidence of inflammation.   KAMALA

## 2023-02-06 ENCOUNTER — TELEPHONE (OUTPATIENT)
Dept: PULMONOLOGY | Age: 73
End: 2023-02-06

## 2023-02-06 NOTE — TELEPHONE ENCOUNTER
Patient cancelled appointment on 2/6/23 with Dr. Candace Sanders for r/b michael meek, SOB, Pulmonary sarcoidosis, former major pt 2011. Reason: provider-behind schedule, pt refused to wait    Patient did not reschedule appointment. Appointment rescheduled for .

## 2023-02-10 ENCOUNTER — HOSPITAL ENCOUNTER (OUTPATIENT)
Age: 73
Discharge: HOME | End: 2023-02-10
Payer: MEDICARE

## 2023-02-10 ENCOUNTER — OFFICE VISIT (OUTPATIENT)
Dept: PULMONOLOGY | Age: 73
End: 2023-02-10

## 2023-02-10 VITALS
BODY MASS INDEX: 33.13 KG/M2 | RESPIRATION RATE: 17 BRPM | SYSTOLIC BLOOD PRESSURE: 145 MMHG | DIASTOLIC BLOOD PRESSURE: 85 MMHG | HEART RATE: 80 BPM | OXYGEN SATURATION: 96 % | WEIGHT: 187 LBS | HEIGHT: 63 IN

## 2023-02-10 DIAGNOSIS — R91.1 LUNG NODULE: Primary | ICD-10-CM

## 2023-02-10 DIAGNOSIS — R91.1 LUNG NODULE: ICD-10-CM

## 2023-02-10 LAB — SEDIMENTATION RATE, ERYTHROCYTE: 19 MM/HR (ref 0–30)

## 2023-02-10 PROCEDURE — 36415 COLL VENOUS BLD VENIPUNCTURE: CPT

## 2023-02-10 PROCEDURE — 85652 RBC SED RATE AUTOMATED: CPT

## 2023-02-10 PROCEDURE — 82164 ANGIOTENSIN I ENZYME TEST: CPT

## 2023-02-10 NOTE — PROGRESS NOTES
P  [facility]                                               Pulmonary Clinic Consult     I had the pleasure of seeing  Rd Wade     Chief Complaint   Patient presents with    Follow-up     R/b michael meek,diallo, pulm sarcoidosis       HISTORY OF PRESENT ILLNESS:    Rd Wade is a 67y.o. year old  Who never smoke and no second hand smoking     The Patient comes in with SOB that has been going on the last 4 years  Associated with mild cough   She  states that it get worse with exercise or walking long distance and he can walk . 200 feet And go 1 flight of stairs before get short winded  She also has stabbing and aches in her chest for the last 2 months    She was seen by cardiology and was sent for MRI heart as she has sarcoid  Sarcoidosis - based on mediastinoscopy in 1998 at [address].     CT scan done July report shows bilateral increase GGO bilateral ,no follow up CT          ALLERGIES:    Allergies   Allergen Reactions    Codeine Other (See Comments)     Skin crawls       PAST MEDICAL HISTORY:       Diagnosis Date    Acromioclavicular joint arthritis 03/15/2018    CHF (congestive heart failure) (HCC)     Chronic back pain     Depression     w/Anxiety    HTN (hypertension)     Hyperlipidemia     Hypothyroid     Macular degeneration, age related     Sarcoidosis        MEDICATIONS:   Current Outpatient Medications   Medication Sig Dispense Refill    Probiotic Product (PROBIOTIC PO) Take by mouth      amLODIPine (NORVASC) 2.5 MG tablet Take 2.5 mg by mouth daily      montelukast (SINGULAIR) 10 MG tablet TAKE 1 TABLET NIGHTLY 90 tablet 3    simvastatin (ZOCOR) 20 MG tablet TAKE 1 TABLET NIGHTLY 90 tablet 1    levothyroxine (SYNTHROID) 75 MCG tablet TAKE 1 TABLET DAILY 90 tablet 3    losartan-hydrochlorothiazide (HYZAAR) 100-25 MG per tablet TAKE 1 TABLET DAILY 90 tablet 3    escitalopram (LEXAPRO) 10 MG tablet TAKE 1 TABLET DAILY 90 tablet 3    Multiple Vitamins-Minerals (WOMENS 50+ MULTI VITAMIN/MIN) TABS Take by mouth daily      omeprazole (PRILOSEC) 20 MG delayed release capsule Take 20 mg by mouth daily      aspirin 81 MG tablet Take 81 mg by mouth daily       No current facility-administered medications for this visit. SOCIAL AND OCCUPATIONAL HEALTH:  Social History     Tobacco Use   Smoking Status Never   Smokeless Tobacco Never     TB :no   Pets no   Industrial exposure:medical records   Birds :    SURGICAL HISTORY:   Past Surgical History:   Procedure Laterality Date    BRONCHOSCOPY       SECTION      LUNG BIOPSY      SHOULDER ARTHROSCOPY Left 2020    LEFT SHOULDER VIDEO ARTHROSCOPY, DISTAL CLAVICLE EXCISION, SUBACROMIAL DECOMPRESSION, ROTATOR CUFF REPAIR WITH ROTATION MEDICAL GRAFT performed by Emory Reddy DO at [address] Left 2020    LEFT SHOULDER VIDEO ARTHROSCOPY, DISTAL CLAVICLE EXCISION, SUBACROMIAL DECOMPRESSION, ROTATOR CUFF REPAIR WITH ROTATION MEDICAL GRAFT       FAMILY HISTORY:   Lung cancer:  DVT or PE     REVIEW OF SYSTEMS:  Constitutional: General health is good . There has been no weight changes. No fevers, fatigue or weakness. Head: Patient denies any history of trauma, convulsive disorder or syncope. Skin:  Patient denies history of changes in pigmentation, eruptions or pruritus. No easy bruising or bleeding. EENT: no nasal congestion   Cardiovascular ,No chest pain ,No edema ,  Respiration:SOB: + ,LAU :+  Gastrointestinal:No GI bleed ,no melena  ,no hematemesis    Musculoskeletal: no joint pain ,no swelling  Neurological:no , syncope. Denies twitching, convulsions, loss of consciousness or memory. Endocrine:  . No history of goiter, exophthalmos or dryness of skin. The patient has no history of diabetes. Hematopoietic:  No history of bleeding disorders or easy bruising.   Rheumatic:  No connective tissue disease history or polyarthritis/inflammatory joint disease. PHYSICAL EXAMINATION:  Vitals:    02/10/23 1038   BP: (!) 145/85   Pulse: 80   Resp: 17   SpO2: 96%     Constitutional: This is a well developed, well nourished 67y.o. year old female who is alert, oriented, cooperative and in no apparent distress. Head was normocephalic and atraumatic. EENT: Mallampati class :  Extraocular muscles intact. External canals are patent and no discharge was appreciated. Septum was midline,   mucosa was without erythema, exudates or cobblestoning. No thrush was noted. Neck: Supple without thyromegaly. No elevated JVP. Trachea was midline. No carotid bruits were auscultated. Respiratory: Rhonchi     Cardiovascular: Regular without murmur, clicks, gallops or rubs. There is no left or right ventricular heave. Pulses:  Carotid, radial and femoral pulses were equally bilaterally. Abdomen: Slightly rounded and soft without organomegaly. No rebound, rigidity or guarding was appreciated. Lymphatic: No lymphadenopathy. Musculoskeletal: no deformity . Extremities: no edema   Skin:  Warm and dry. Good color, turgor and pigmentation. No lesions or scars. Neurological/Psychiatric: The patient's general behavior, level of consciousness, thought content and emotional status is normal.  Cranial nerves II-XII are intact. DATA: S  PFT   1. Spirometry: The FVC is 2.61 L, which is 87% of predicted. The FEV1 is  2.12 L, which is 92% of predicted. The FEV1/FVC ratio is 81. There is no  response to bronchodilators. 2.  Plethysmography:  The total lung capacity is 87% of predicted. 3.  The diffusion capacity of carbon monoxide is 27.56, which is 125% of  predicted. 4.  The flow volume loop is normal.     IMPRESSION:  There are no obstructive or restrictive defects present. The  lung volumes are normal.  The DLCO is mildly elevated, which is nonspecific. A 6-minute walk test was conducted per protocol. The patient walked 1000  feet.   There was a desaturation to 91%, but supplemental oxygen is not  indicated. IMPRESSION:    1-sarcoidosis since 1998  2-Chest pain   3-Possible KENNETH    PLAN:      -will proceed with CT chest as she has some changes 2021  -will get PFT   -work chest as per cardiology ,she is to have cardiac MRI r/o cardiac sarcoidosis   -ACEI level   -if any further progression in CT then will considerct therapy/biopsy     Flu and Pneumovax as per primarily     Thank you for allowing me to participate in [facility]. I will keep following with you ,should you have any concerns ,please contact us at [place] pulmonary office     Sincerely,        Shree Yuan MD  Pulmonary & Critical Care Medicine     NOTE: This report was transcribed using voice recognition software. Every effort was made to ensure accuracy; however, inadvertent computerized transcription errors may be present.

## 2023-02-11 LAB — ANGIOTENSIN CONVERTING ENZYME: 32 U/L (ref 16–85)

## 2023-03-28 ENCOUNTER — TELEPHONE (OUTPATIENT)
Dept: PULMONOLOGY | Age: 73
End: 2023-03-28

## 2023-08-07 ENCOUNTER — HOSPITAL ENCOUNTER (OUTPATIENT)
Dept: WOMENS IMAGING | Age: 73
Discharge: HOME | End: 2023-08-07
Payer: MEDICARE

## 2023-08-07 DIAGNOSIS — Z78.0 POST-MENOPAUSE: ICD-10-CM

## 2023-08-07 DIAGNOSIS — Z12.31 ENCOUNTER FOR SCREENING MAMMOGRAM FOR MALIGNANT NEOPLASM OF BREAST: ICD-10-CM

## 2023-08-07 PROCEDURE — 77080 DXA BONE DENSITY AXIAL: CPT

## 2023-08-07 PROCEDURE — 77067 SCR MAMMO BI INCL CAD: CPT

## 2023-08-07 PROCEDURE — 77063 BREAST TOMOSYNTHESIS BI: CPT

## 2023-10-04 NOTE — DISCHARGE SUMMARY
Physician Discharge Summary     Patient ID:  Ronaldo Stone  [id number]  9 y.o.  1950    Admit date: 10/26/2022    Discharge date and time: 10/29/2022  1:59 PM     Admitting Physician: Teagan Marie MD     Discharge Physician: Hattie Mcallister MD    Admission Diagnoses: Proctocolitis [K52.9]  Hypokalemia [E87.6]  Hypomagnesemia [E83.42]  Colitis [K52.9]  Positive occult stool blood test [R19.5]  Infectious diarrhea [A09]  Leukocytosis, unspecified type [D72.829]    Discharge Diagnoses: Principal Problem:    Colitis  Active Problems:    Hypokalemia    Hypomagnesemia    Infectious diarrhea    Leukocytosis    Positive occult stool blood test    History of CHF (congestive heart failure)    Hypothyroidism    Proctocolitis    C. difficile colitis    GERD (gastroesophageal reflux disease)  Resolved Problems:    * No resolved hospital problems. *      Admission Condition: fair    Discharged Condition: stable    Indication for Admission:    Per HPI  The patient is a 67 y.o. female with PMH below, presents with diarrhea, abd cramping, nausea, recent c diff/PNA. Pt reports that she tested positive for c diff ~ 6 wks ago. She completed 10 d oral vanco.  She subsequently tested negative for c diff. However, she had persistent watery diarrhea and diffuse crampy abd pain since her Dx. she has had associated nausea without emesis. She is concerned that she may still have C. difficile. Hospital Course:      Sepsis  - POA (RR, RR; lactic nml). -  2/2 to colitis   -Placed on IV antibiotics Cipro and Flagyl and oral vancomycin. Patient is C. difficile positive now. Continue oral Vanco and stop Cipro and Flagyl.  -Continue IV fluid hydration. - monitored on telemetry   Sepsis resolved      Proctocolitis and persistent diarrhea x6 wks. GI bleed  Recurrent C. difficile colitis  -GI consulted   -Guaiac +    - stool studies for c diff positive   - Hemoglobin  monitored and stable   - Held home ASA.     - Likely representing persistent c diff colitis (recent confirmed case, completed course of oral vanco). -Continue oral vancomycin.  - Ordered probiotic. - C-Diff precautions   -> Patient is better; frequency of diarrhea has decreased; stools little more formed now. She will be discharged home on a longer course of vancomycin- slow taper over 4 weeks . Plan to follow-up with GI as outpt. May need difficid; may be a candidate for stool transplant  , if she has recurrent infection    Abdominal cramping,   - low dose morphine, added scheduled Bentyl.    - stated improved, only happening with BMs  - monitor   Pain is improved now      Hypokalemia  Hypomagnesemia  - Replacement protocol. Hypothyroidism,   -continue home levothyroxine  - TSH 5.17, T4 free 1.5     HTN  -mildly low DBP's  - suspect 2/2 volume depletion. - held  Cozaar, amlodipine, Lasix  - can resume on DC      Recent hospital admission with possible CHF at [facility]  - pt stated her BNP was elevated at that time and started on Lasix  - she has outpatient echo scheduled  - appears compensated at this time; held Lasix  as above. S/p gentle IVF hydration    - monitored daily weights, low sodium diet      GERD  - Continue PPI       HLD  - Continue statin       DVT Prophylaxis: scds  Diet: ADULT DIET; Regular; Low Fiber  Code Status: Full Code       Consults: GI    Significant Diagnostic Studies:   Data:   CBC:             Recent Labs     10/27/22  0507 10/27/22  1207 10/28/22  0401 10/28/22  1158 10/28/22  1955 10/29/22  0420 10/29/22  1207   WBC 12.8*  --  11.6*  --   --  9.9  --    HGB 11.8*   < > 12.3   < > 12.2 12.1 12.5   HCT 35.2*   < > 36.9   < > 37.3 36.9 36.6   MCV 95.7  --  96.9  --   --  95.4  --      --  407  --   --  432  --     < > = values in this interval not displayed.          BMP:         Recent Labs     10/27/22  0508 10/28/22  0401 10/29/22  0420    136 143   K 3.4* 3.9 4.6    105 107   CO2 28 23 27   BUN 10 4* 3* CREATININE 0.8 0.9 0.6         LIVER PROFILE:        Recent Labs     10/26/22  1540 10/27/22  0508   AST 23 15   ALT 32 21   LIPASE 41.0  --    BILITOT 0.3 <0.2   ALKPHOS 95 72         PT/INR: No results for input(s): PROTIME, INR in the last 72 hours. CULTURES  COVID/Influenza: not detected       Stool C-Diff- positive       RADIOLOGY  CT ABDOMEN PELVIS W IV CONTRAST Additional Contrast? None   Final Result   1. Thickening and mild adjacent inflammatory changes involving the sigmoid   colon and rectum consistent with proctocolitis. 2. Colonic diverticulosis. 3. Small hiatal hernia. XR CHEST PORTABLE   Final Result   Clear lungs. Treatments: as above    Discharge Exam:      Blood pressure 131/81, pulse 90, temperature 98.1 °F (36.7 °C), temperature source Oral, resp. rate 16, height 5' 4\" (1.626 m), weight 178 lb 1.6 oz (80.8 kg), SpO2 98 %  Gen: No distress. Alert. Eyes: PERRL. No sclera icterus. No conjunctival injection. ENT: No discharge. Pharynx clear. Neck: No JVD. Trachea midline. Resp: No accessory muscle use. No crackles. No wheezes. No rhonchi. CV: Regular rate. Regular rhythm. No murmur. No rub. No edema. Capillary Refill: Brisk,< 3 seconds   Peripheral Pulses: +2 palpable, equal bilaterally   GI: Non-tender. Non-distended. + bowel sounds. Skin: Warm and dry. No nodule on exposed extremities. No rash on exposed extremities. M/S: No cyanosis. No joint deformity. No clubbing. Neuro: Awake. Grossly nonfocal    Psych: Oriented x 3. No anxiety or agitation.        Disposition: home    Patient Instructions:      Medication List        START taking these medications      saccharomyces boulardii 250 MG capsule  Commonly known as: FLORASTOR  Take 1 capsule by mouth 2 times daily     vancomycin 125 MG capsule  Commonly known as: Vancocin  Take 1 tab QID for 7 days ; then 1 TID for 7 days ; then 1 BID for 7 days; then 1 daily for 7 days            CONTINUE taking these medications      amLODIPine 2.5 MG tablet  Commonly known as: NORVASC     aspirin 81 MG tablet     escitalopram 10 MG tablet  Commonly known as: LEXAPRO  TAKE 1 TABLET DAILY     levothyroxine 75 MCG tablet  Commonly known as: SYNTHROID  TAKE 1 TABLET DAILY     losartan-hydroCHLOROthiazide 100-25 MG per tablet  Commonly known as: HYZAAR  TAKE 1 TABLET DAILY     montelukast 10 MG tablet  Commonly known as: SINGULAIR  TAKE 1 TABLET NIGHTLY     omeprazole 20 MG delayed release capsule  Commonly known as: PRILOSEC     simvastatin 20 MG tablet  Commonly known as: ZOCOR  TAKE 1 TABLET NIGHTLY     Womens 50+ Multi Vitamin/Min Tabs            STOP taking these medications      albuterol sulfate  (90 Base) MCG/ACT inhaler  Commonly known as: PROVENTIL;VENTOLIN;PROAIR     dexamethasone 4 MG tablet  Commonly known as: DECADRON     dicyclomine 20 MG tablet  Commonly known as: BENTYL     FIBER 7 PO     furosemide 20 MG tablet  Commonly known as: LASIX     ibuprofen 600 MG tablet  Commonly known as: ADVIL;MOTRIN     magnesium 30 MG tablet               Where to Get Your Medications        These medications were sent to [facility] [id number] - [place], [place] - [address] [address] [facility] Jumisael Santana [phone] - F [phone]  [address] [facility] [address]      Phone: [phone]   saccharomyces boulardii 250 MG capsule  vancomycin 125 MG capsule       Activity: activity as tolerated    Wound Care: none needed    Follow-up with PCP, GI  in 1-2 weeks     Signed:  Castillo Candelaria MD 5

## 2024-01-04 ENCOUNTER — HOSPITAL ENCOUNTER (OUTPATIENT)
Age: 74
Discharge: HOME | End: 2024-01-04
Payer: MEDICARE

## 2024-01-04 ENCOUNTER — HOSPITAL ENCOUNTER (OUTPATIENT)
Dept: GENERAL RADIOLOGY | Age: 74
Discharge: HOME | End: 2024-01-04
Payer: MEDICARE

## 2024-01-04 DIAGNOSIS — R05.3 PERSISTENT COUGH: ICD-10-CM

## 2024-01-04 PROCEDURE — 71046 X-RAY EXAM CHEST 2 VIEWS: CPT

## 2024-01-15 ENCOUNTER — TELEPHONE (OUTPATIENT)
Dept: CARDIOLOGY CLINIC | Age: 74
End: 2024-01-15

## 2024-01-15 DIAGNOSIS — D86.9 SARCOID: Primary | ICD-10-CM

## 2024-01-15 DIAGNOSIS — R07.89 OTHER CHEST PAIN: ICD-10-CM

## 2024-01-15 DIAGNOSIS — R06.02 SOB (SHORTNESS OF BREATH): ICD-10-CM

## 2024-01-15 NOTE — TELEPHONE ENCOUNTER
Pt needs medication for claustrophobia. Pt scheduled to have cardiac MRI. Please send medication to [facility] [phone].

## 2024-01-15 NOTE — TELEPHONE ENCOUNTER
Cardiac MRI scheduled for 2/27/24; can wait until KAMALA returns to prescribe something.   Thanks

## 2024-01-16 NOTE — TELEPHONE ENCOUNTER
She can take 2 benadryl 1 hour before procedure but has to take someone to drive her home.  KAMALA

## 2024-02-01 ENCOUNTER — PREP FOR PROCEDURE (OUTPATIENT)
Dept: PULMONOLOGY | Age: 74
End: 2024-02-01

## 2024-02-27 ENCOUNTER — TELEPHONE (OUTPATIENT)
Dept: CARDIOLOGY CLINIC | Age: 74
End: 2024-02-27

## 2024-02-27 ENCOUNTER — TELEPHONE (OUTPATIENT)
Dept: PULMONOLOGY | Age: 74
End: 2024-02-27

## 2024-02-27 ENCOUNTER — HOSPITAL ENCOUNTER (OUTPATIENT)
Dept: MRI IMAGING | Age: 74
Discharge: HOME | End: 2024-02-27
Attending: INTERNAL MEDICINE

## 2024-02-27 DIAGNOSIS — R06.02 SOB (SHORTNESS OF BREATH): ICD-10-CM

## 2024-02-27 DIAGNOSIS — R07.89 OTHER CHEST PAIN: ICD-10-CM

## 2024-02-27 DIAGNOSIS — D86.9 SARCOID: ICD-10-CM

## 2024-02-27 NOTE — TELEPHONE ENCOUNTER
Pt is going to reschedule her MRI appointment, she was very anxious and could not complete the test.    If possible, Jazmyne is requesting pt be given ativan in order to complete the test.    Please advise.

## 2024-02-27 NOTE — TELEPHONE ENCOUNTER
Boubacar [place] [facility] called needing new orders for pt CT and PFT as they're .  Per the PFT dept closing tomorrow, pt testing will need to be rescheduled.

## 2024-02-27 NOTE — TELEPHONE ENCOUNTER
Spoke with pt and informed that PFT has to be cancelled tomorrow, so she is going to cancel PFT and CT chest.  Pt is unable to do the PFT at [facility] today while she is there as she has to get back home before dark. Will check with PFT lab when they open back up to see if they can work pt in before follow up 3/7/24.

## 2024-02-27 NOTE — TELEPHONE ENCOUNTER
Will forward to KAMALA to advise upon return to office. Pt is due for OV as well    LOV KAMALA 1/17/2023   crib

## 2024-03-04 NOTE — TELEPHONE ENCOUNTER
I ordered this test over a year ago and have not seen her since.  I cannot prescribe ativan for the test since I haven't seen her in over a year.  She should just be seen in the office.  KAMALA

## 2024-03-05 NOTE — TELEPHONE ENCOUNTER
Pt returned call to schedule appt. Per the pt she would like to see rkg due to location and jorge not wanting to prescribe ativan. Asked pt what she follows up for with cardiology and she stated for the calcification of her aorta. Pt was scheduled with vitaly for 4/8/24.

## 2024-03-08 NOTE — TELEPHONE ENCOUNTER
Pt will need to be rescheduled next available, or can check with Dr. Delarosa if booking out too far to see if pt can be worked in.

## 2024-03-12 ENCOUNTER — HOSPITAL ENCOUNTER (OUTPATIENT)
Dept: CT IMAGING | Age: 74
Discharge: HOME | End: 2024-03-12
Payer: MEDICARE

## 2024-03-12 DIAGNOSIS — R91.1 LUNG NODULE: ICD-10-CM

## 2024-03-12 PROCEDURE — 71250 CT THORAX DX C-: CPT

## 2024-03-27 ENCOUNTER — HOSPITAL ENCOUNTER (OUTPATIENT)
Dept: PULMONOLOGY | Age: 74
Discharge: HOME | End: 2024-03-27
Payer: MEDICARE

## 2024-03-27 VITALS — OXYGEN SATURATION: 96 %

## 2024-03-27 DIAGNOSIS — R06.02 SOB (SHORTNESS OF BREATH): ICD-10-CM

## 2024-03-27 DIAGNOSIS — R91.1 LUNG NODULE: Primary | ICD-10-CM

## 2024-03-27 LAB
DLCO %PRED: 83 %
DLCO PRED: NORMAL
DLCO/VA %PRED: NORMAL
DLCO/VA PRED: NORMAL
DLCO/VA: NORMAL
DLCO: NORMAL
EXPIRATORY TIME-POST: NORMAL
EXPIRATORY TIME: NORMAL
FEF 25-75 %CHNG: NORMAL
FEF 25-75 POST %PRED: NORMAL
FEF 25-75% %PRED-PRE: NORMAL
FEF 25-75% PRED: NORMAL
FEF 25-75-POST: NORMAL
FEF 25-75-PRE: NORMAL
FEV1 %PRED-POST: NORMAL
FEV1 %PRED-PRE: 92 %
FEV1 PRED: NORMAL
FEV1-POST: NORMAL
FEV1-PRE: NORMAL
FEV1/FVC %PRED-POST: NORMAL
FEV1/FVC %PRED-PRE: NORMAL
FEV1/FVC PRED: NORMAL
FEV1/FVC-POST: NORMAL
FEV1/FVC-PRE: 76 %
FVC %PRED-POST: NORMAL
FVC %PRED-PRE: NORMAL
FVC PRED: NORMAL
FVC-POST: NORMAL
FVC-PRE: NORMAL
GAW %PRED: NORMAL
GAW PRED: NORMAL
GAW: NORMAL
IC PRE %PRED: NORMAL
IC PRED: NORMAL
IC: NORMAL
MEP: NORMAL
MIP: NORMAL
MVV %PRED-PRE: NORMAL
MVV PRED: NORMAL
MVV-PRE: NORMAL
PEF %PRED-POST: NORMAL
PEF %PRED-PRE: NORMAL
PEF PRED: NORMAL
PEF%CHNG: NORMAL
PEF-POST: NORMAL
PEF-PRE: NORMAL
RAW %PRED: NORMAL
RAW PRED: NORMAL
RAW: NORMAL
RV PRE %PRED: NORMAL
RV PRED: NORMAL
RV: NORMAL
SVC %PRED: NORMAL
SVC PRED: NORMAL
SVC: NORMAL
TLC PRE %PRED: 82 %
TLC PRED: NORMAL
TLC: NORMAL
VA %PRED: NORMAL
VA PRED: NORMAL
VA: NORMAL
VTG %PRED: NORMAL
VTG PRED: NORMAL
VTG: NORMAL

## 2024-03-27 PROCEDURE — 94010 BREATHING CAPACITY TEST: CPT

## 2024-03-27 PROCEDURE — 6370000000 HC RX 637 (ALT 250 FOR IP): Performed by: INTERNAL MEDICINE

## 2024-03-27 PROCEDURE — 94760 N-INVAS EAR/PLS OXIMETRY 1: CPT

## 2024-03-27 PROCEDURE — 94060 EVALUATION OF WHEEZING: CPT

## 2024-03-27 PROCEDURE — 94726 PLETHYSMOGRAPHY LUNG VOLUMES: CPT

## 2024-03-27 PROCEDURE — 94640 AIRWAY INHALATION TREATMENT: CPT

## 2024-03-27 PROCEDURE — 94729 DIFFUSING CAPACITY: CPT

## 2024-03-27 RX ORDER — ALBUTEROL SULFATE 90 UG/1
4 AEROSOL, METERED RESPIRATORY (INHALATION) ONCE
Status: COMPLETED | OUTPATIENT
Start: 2024-03-27 | End: 2024-03-27

## 2024-03-27 RX ADMIN — Medication 4 PUFF: at 14:32

## 2024-03-27 ASSESSMENT — PULMONARY FUNCTION TESTS
FEV1_PERCENT_PREDICTED_PRE: 92
FEV1/FVC_PRE: 76

## 2024-03-29 ENCOUNTER — TELEPHONE (OUTPATIENT)
Dept: PULMONOLOGY | Age: 74
End: 2024-03-29

## 2024-03-29 NOTE — PROCEDURES
[address] [address]                           PULMONARY FUNCTION      PATIENT NAME: ANA MAI               : 1950  MED REC NO: [id number]                      ROOM:   ACCOUNT NO: [id number]                       ADMIT DATE: 2024  PROVIDER: iH Mcneal MD      DATE OF PROCEDURE:  2024    STUDY:  PFT.    INDICATION:  Pulmonary nodule.    FINDINGS:    1. Spirometry:  The FEV1 is 1.92 L which is 92% of predicted.  The FEV1/FVC ratio is normal.  2. Lung volumes:  Total lung capacity is normal at 4.05 L or 82% of predicted.  3. Diffusion capacity:  DLCO was also normal at 18.46 mL/min per mmHg, which is 83% of predicted.  4. Flow-volume loop is relatively normal.    IMPRESSION:  Essentially normal lung function testing.          HI MCNEAL MD      D:  2024 08:26:22     T:  2024 03:58:36     DCB/BENITOS  Job #:  [id number]     Doc#:  [id number]

## 2024-03-29 NOTE — TELEPHONE ENCOUNTER
Pt called stating that she had a PFT and they didn't have it and she wanted to make sure office has her results for her follow up next week.  PFT result is in epic.

## 2024-04-05 NOTE — PROGRESS NOTES
[facility] Office Note  4/8/2024   Previously followed with my partner Dr. Bolden  Subjective:  Ms. Gottlieb is here to transfer care from Dr. Bolden for location - reasons for cardiac care of HTN, HLD, sarcoidosis of mediastinal lymph nodes in 1998 at [facility] but we have no access to those..  C/o chest pain, shortness of breath    HPI:   Nayeli Gottlieb is a 73 y.o. female who previously had a normal stress echo 11/15/22. Patient had a CT of chest 3/12/14 that showed stable pulmonary nodule.  She follows with pulmonologist Ruddy Benson   Today, she reports she was not able to do MRI as  advised by Dr Bolden due to claustrophobia.  She has nonexertional substernal pressure  lasting few minutes that lasts few minutes no shortness of breath   She reports [facility] told her she has congestive heart failure.  She does all her own shopping.  She does a little gardening.  Her chest pain started 6 months ago.  The pain occurs randomly, does not radiate, and isn't any worse.  Pain is in the center of her chest.  Pain does not occur with eating.  Pain lasts a few minutes.  She does not get short of breath.  She feels pressure like if she swallowed something and it is stuck.  Her left arm goes numb at night for the past 3 months gets better once she shakes it up.  This is not any worse. She has disc disease in L4 and L5. She has acid reflux and takes a stomach pill.  She denies any difficulty with swallowing. She gets short of breath going up one flight of stairs. She was diagnosed with sarcoidosis in 1998. She denies any problems with her female organs which are all present. Patient denies current edema, sob, palpitations, dizziness or syncope.  Patient is taking all cardiac medications as prescribed and tolerates them well.      12 point ROS negative in all areas as listed below except as in [place]  Constitutional, EENT,  pulmonary, GI, , Musculoskeletal, skin, neurological, hematological, endocrine,

## 2024-04-08 ENCOUNTER — OFFICE VISIT (OUTPATIENT)
Dept: CARDIOLOGY CLINIC | Age: 74
End: 2024-04-08
Payer: MEDICARE

## 2024-04-08 VITALS
BODY MASS INDEX: 33.27 KG/M2 | HEART RATE: 81 BPM | HEIGHT: 63 IN | SYSTOLIC BLOOD PRESSURE: 120 MMHG | OXYGEN SATURATION: 95 % | DIASTOLIC BLOOD PRESSURE: 70 MMHG | WEIGHT: 187.8 LBS

## 2024-04-08 DIAGNOSIS — K80.20 CALCULUS OF GALLBLADDER WITHOUT CHOLECYSTITIS WITHOUT OBSTRUCTION: ICD-10-CM

## 2024-04-08 DIAGNOSIS — R07.89 OTHER CHEST PAIN: ICD-10-CM

## 2024-04-08 DIAGNOSIS — R06.02 SOB (SHORTNESS OF BREATH): ICD-10-CM

## 2024-04-08 DIAGNOSIS — I10 PRIMARY HYPERTENSION: ICD-10-CM

## 2024-04-08 DIAGNOSIS — R07.2 PRECORDIAL PAIN: Primary | ICD-10-CM

## 2024-04-08 PROCEDURE — 99214 OFFICE O/P EST MOD 30 MIN: CPT | Performed by: INTERNAL MEDICINE

## 2024-04-08 PROCEDURE — 3074F SYST BP LT 130 MM HG: CPT | Performed by: INTERNAL MEDICINE

## 2024-04-08 PROCEDURE — 1124F ACP DISCUSS-NO DSCNMKR DOCD: CPT | Performed by: INTERNAL MEDICINE

## 2024-04-08 PROCEDURE — 93000 ELECTROCARDIOGRAM COMPLETE: CPT | Performed by: INTERNAL MEDICINE

## 2024-04-08 PROCEDURE — 3078F DIAST BP <80 MM HG: CPT | Performed by: INTERNAL MEDICINE

## 2024-04-08 RX ORDER — LISINOPRIL AND HYDROCHLOROTHIAZIDE 25; 20 MG/1; MG/1
TABLET ORAL
COMMUNITY
Start: 2024-04-03

## 2024-04-08 NOTE — PATIENT INSTRUCTIONS
Plan:  Labs reviewed in epic and discussed with patient.  Current medications reviewed.  Refills given as warranted.  Call [phone] to schedule An Echocardiogram   - Looks at the size and strength of your heart  - This test is an ultrasound of your heart just like when you see an ultrasound that a woman has when she is pregnant.  - The test records the movement of your heart valves and chambers.  - It evaluates heart valves, chamber enlargement, abnormal openings, or any fluid in the sac surrounding the heart.    Call [phone] to schedule a Coronary CTA  Stop taking amlodipine at this time.  I am going to start you on a new medication for the cardiac testing.  Start taking metoprolol tartrate 25 mg two times a day.  Check your pulse at home.  You will want your heart rate to be in 55-65 bpm range.  If your heart rate is not at goal after three days increase your metoprolol to 50 mg two times daily.  -you can do this at [facility] or [place]  -schedule this test after the middle of next week so that you have enough time to get your heart rate done.    I will personally review your tests and then I will have my staff call you with the results.   If your heart testing all comes back normal then I would recommend you talk to your pcp about your gall stones.  Do not let your blood pressure upper number drop below 100    Follow up with me depending on the results of your testing.

## 2024-05-14 ENCOUNTER — HOSPITAL ENCOUNTER (OUTPATIENT)
Dept: CARDIOLOGY | Age: 74
Discharge: HOME | End: 2024-05-16
Attending: INTERNAL MEDICINE
Payer: MEDICARE

## 2024-05-14 ENCOUNTER — TELEPHONE (OUTPATIENT)
Dept: CARDIOLOGY CLINIC | Age: 74
End: 2024-05-14

## 2024-05-14 VITALS
WEIGHT: 187 LBS | HEIGHT: 62 IN | BODY MASS INDEX: 34.41 KG/M2 | SYSTOLIC BLOOD PRESSURE: 120 MMHG | DIASTOLIC BLOOD PRESSURE: 70 MMHG

## 2024-05-14 DIAGNOSIS — R07.2 PRECORDIAL PAIN: ICD-10-CM

## 2024-05-14 DIAGNOSIS — R06.02 SOB (SHORTNESS OF BREATH): ICD-10-CM

## 2024-05-14 LAB
ECHO AO ASC DIAM: 2.9 CM
ECHO AO ASCENDING AORTA INDEX: 1.56 CM/M2
ECHO AO ROOT DIAM: 2.6 CM
ECHO AO ROOT INDEX: 1.4 CM/M2
ECHO AV AREA PEAK VELOCITY: 1.7 CM2
ECHO AV AREA VTI: 1.8 CM2
ECHO AV AREA/BSA PEAK VELOCITY: 0.9 CM2/M2
ECHO AV AREA/BSA VTI: 1 CM2/M2
ECHO AV MEAN GRADIENT: 6 MMHG
ECHO AV MEAN VELOCITY: 1.2 M/S
ECHO AV PEAK GRADIENT: 10 MMHG
ECHO AV PEAK VELOCITY: 1.6 M/S
ECHO AV VELOCITY RATIO: 0.56
ECHO AV VTI: 35.3 CM
ECHO BSA: 1.93 M2
ECHO EST RA PRESSURE: 3 MMHG
ECHO LA AREA 2C: 17.2 CM2
ECHO LA AREA 4C: 16.3 CM2
ECHO LA DIAMETER INDEX: 2.2 CM/M2
ECHO LA DIAMETER: 4.1 CM
ECHO LA MAJOR AXIS: 4.3 CM
ECHO LA MINOR AXIS: 5.1 CM
ECHO LA TO AORTIC ROOT RATIO: 1.58
ECHO LA VOL BP: 51 ML (ref 22–52)
ECHO LA VOL MOD A2C: 47 ML (ref 22–52)
ECHO LA VOL MOD A4C: 48 ML (ref 22–52)
ECHO LA VOL/BSA BIPLANE: 27 ML/M2 (ref 16–34)
ECHO LA VOLUME INDEX MOD A2C: 25 ML/M2 (ref 16–34)
ECHO LA VOLUME INDEX MOD A4C: 26 ML/M2 (ref 16–34)
ECHO LV E' LATERAL VELOCITY: 6 CM/S
ECHO LV E' SEPTAL VELOCITY: 5 CM/S
ECHO LV FRACTIONAL SHORTENING: 29 % (ref 28–44)
ECHO LV INTERNAL DIMENSION DIASTOLE INDEX: 2.63 CM/M2
ECHO LV INTERNAL DIMENSION DIASTOLIC: 4.9 CM (ref 3.9–5.3)
ECHO LV INTERNAL DIMENSION SYSTOLIC INDEX: 1.88 CM/M2
ECHO LV INTERNAL DIMENSION SYSTOLIC: 3.5 CM
ECHO LV IVSD: 0.8 CM (ref 0.6–0.9)
ECHO LV MASS 2D: 120.8 G (ref 67–162)
ECHO LV MASS INDEX 2D: 65 G/M2 (ref 43–95)
ECHO LV POSTERIOR WALL DIASTOLIC: 0.7 CM (ref 0.6–0.9)
ECHO LV RELATIVE WALL THICKNESS RATIO: 0.29
ECHO LVOT AREA: 3.1 CM2
ECHO LVOT AV VTI INDEX: 0.57
ECHO LVOT DIAM: 2 CM
ECHO LVOT MEAN GRADIENT: 2 MMHG
ECHO LVOT PEAK GRADIENT: 3 MMHG
ECHO LVOT PEAK VELOCITY: 0.9 M/S
ECHO LVOT STROKE VOLUME INDEX: 33.8 ML/M2
ECHO LVOT SV: 62.8 ML
ECHO LVOT VTI: 20 CM
ECHO MV A VELOCITY: 0.96 M/S
ECHO MV E DECELERATION TIME (DT): 193 MS
ECHO MV E VELOCITY: 0.71 M/S
ECHO MV E/A RATIO: 0.74
ECHO MV E/E' LATERAL: 11.83
ECHO MV E/E' RATIO (AVERAGED): 13.02
ECHO MV E/E' SEPTAL: 14.2
ECHO RIGHT VENTRICULAR SYSTOLIC PRESSURE (RVSP): 25 MMHG
ECHO RV TAPSE: 1.8 CM (ref 1.7–?)
ECHO TV REGURGITANT MAX VELOCITY: 2.35 M/S
ECHO TV REGURGITANT PEAK GRADIENT: 22 MMHG

## 2024-05-14 PROCEDURE — 93306 TTE W/DOPPLER COMPLETE: CPT

## 2024-05-14 PROCEDURE — 93306 TTE W/DOPPLER COMPLETE: CPT | Performed by: INTERNAL MEDICINE

## 2024-05-14 NOTE — TELEPHONE ENCOUNTER
----- Message from Gordon Munson MD sent at 5/14/2024  3:27 PM EDT -----  Heart function is good on the echocardiogram.  ----- Message -----  From: Marcial Sahu MD  Sent: 5/14/2024   1:17 PM EDT  To: Gordon Munson MD

## 2024-06-12 NOTE — TELEPHONE ENCOUNTER
Patient last seen on 4/8/24. Advised to follow up none. Next appointment none.   LABS 6/2023 CBC, CMP

## 2024-06-12 NOTE — TELEPHONE ENCOUNTER
Medication Refill    Medication needing refilled: metoprolol tartrate (LOPRESSOR) 25 MG tablet [[id number]]    Dosage of the medication: 25mg    How are you taking this medication (QD, BID, TID, QID, PRN):  Take 2 tablet by mouth 2 times daily     30 or 90 day supply called in: 90    When will you run out of your medication:1 wk    Which Pharmacy are we sending the medication to?:   [facility] - [place], [place] - [address] [place] [address] -  [phone] - F [phone]  [address] [place] [address]  Phone: [phone]  Fax: [phone]

## 2024-06-30 ENCOUNTER — APPOINTMENT (OUTPATIENT)
Dept: GENERAL RADIOLOGY | Age: 74
End: 2024-06-30
Payer: MEDICARE

## 2024-06-30 ENCOUNTER — HOSPITAL ENCOUNTER (EMERGENCY)
Age: 74
Discharge: HOME | End: 2024-06-30
Attending: EMERGENCY MEDICINE
Payer: MEDICARE

## 2024-06-30 VITALS
DIASTOLIC BLOOD PRESSURE: 70 MMHG | RESPIRATION RATE: 16 BRPM | WEIGHT: 186 LBS | BODY MASS INDEX: 32.96 KG/M2 | HEIGHT: 63 IN | OXYGEN SATURATION: 94 % | HEART RATE: 63 BPM | SYSTOLIC BLOOD PRESSURE: 162 MMHG | TEMPERATURE: 98.2 F

## 2024-06-30 DIAGNOSIS — S90.121A CONTUSION OF THIRD TOE OF RIGHT FOOT, INITIAL ENCOUNTER: Primary | ICD-10-CM

## 2024-06-30 PROCEDURE — 6370000000 HC RX 637 (ALT 250 FOR IP): Performed by: EMERGENCY MEDICINE

## 2024-06-30 PROCEDURE — 73660 X-RAY EXAM OF TOE(S): CPT

## 2024-06-30 PROCEDURE — 99283 EMERGENCY DEPT VISIT LOW MDM: CPT

## 2024-06-30 RX ORDER — IBUPROFEN 600 MG/1
600 TABLET ORAL 3 TIMES DAILY PRN
Qty: 30 TABLET | Refills: 0 | Status: SHIPPED | OUTPATIENT
Start: 2024-06-30

## 2024-06-30 RX ORDER — IBUPROFEN 600 MG/1
600 TABLET ORAL ONCE
Status: COMPLETED | OUTPATIENT
Start: 2024-06-30 | End: 2024-06-30

## 2024-06-30 RX ADMIN — IBUPROFEN 600 MG: 600 TABLET, FILM COATED ORAL at 08:51

## 2024-06-30 ASSESSMENT — PAIN SCALES - GENERAL: PAINLEVEL_OUTOF10: 5

## 2024-06-30 ASSESSMENT — PAIN DESCRIPTION - LOCATION: LOCATION: TOE (COMMENT WHICH ONE);FOOT

## 2024-06-30 ASSESSMENT — PAIN - FUNCTIONAL ASSESSMENT: PAIN_FUNCTIONAL_ASSESSMENT: 0-10

## 2024-06-30 ASSESSMENT — PAIN DESCRIPTION - ORIENTATION: ORIENTATION: LEFT

## 2024-06-30 ASSESSMENT — LIFESTYLE VARIABLES
HOW OFTEN DO YOU HAVE A DRINK CONTAINING ALCOHOL: NEVER
HOW MANY STANDARD DRINKS CONTAINING ALCOHOL DO YOU HAVE ON A TYPICAL DAY: PATIENT DOES NOT DRINK

## 2024-06-30 NOTE — ED PROVIDER NOTES
[facility]     EMERGENCY DEPARTMENT ENCOUNTER            Pt Name: Nayeli Gottlieb   MRN: [id number]   Birthdate 1950   Date of evaluation: 2024   Provider: ANCELMO ROGERS MD   PCP: Talisha Soriano APRN - CNP   Note Started: 8:27 AM EDT 24          CHIEF COMPLAINT     Chief Complaint   Patient presents with    Foot Pain     Pt states that late Friday night she was walking and states that she hit a wood door with her left foot and states that she has had increased pain and some swelling in foot since.             HISTORY OF PRESENT ILLNESS:   History from : Patient   Limitations to history : None     Nayeli Gottlieb is a 73 y.o. female with h/o HTN, hypothyroid and depression who presents pain to her left 2nd and 3rd toes after striking on a wooden door 2 days ago.  States she has been icing it but has not tried any meds for pain.  Able to ambulate if she does not bear weight on digit.      Nursing Notes were all reviewed and agreed with, or any disagreements were addressed in the HPI.     REVIEW OF SYSTEMS :    Positives and Pertinent negatives as per HPI.      MEDICAL HISTORY   has a past medical history of Acromioclavicular joint arthritis (03/15/2018), CHF (congestive heart failure) (HCC), Chronic back pain, Depression, HTN (hypertension), Hyperlipidemia, Hypothyroid, Macular degeneration, age related, and Sarcoidosis.    Past Surgical History:   Procedure Laterality Date    BRONCHOSCOPY       SECTION      LUNG BIOPSY      SHOULDER ARTHROSCOPY Left 2020    LEFT SHOULDER VIDEO ARTHROSCOPY, DISTAL CLAVICLE EXCISION, SUBACROMIAL DECOMPRESSION, ROTATOR CUFF REPAIR WITH ROTATION MEDICAL GRAFT performed by Alice Ferguson DO [facility]    SHOULDER SURGERY Left 2020    LEFT SHOULDER VIDEO ARTHROSCOPY, DISTAL CLAVICLE EXCISION, SUBACROMIAL DECOMPRESSION, ROTATOR CUFF REPAIR WITH ROTATION MEDICAL GRAFT      CURRENTMEDICATIONS       Previous Medications

## 2024-07-09 ENCOUNTER — HOSPITAL ENCOUNTER (OUTPATIENT)
Dept: CT IMAGING | Age: 74
Discharge: HOME | End: 2024-07-09
Attending: INTERNAL MEDICINE
Payer: MEDICARE

## 2024-07-09 ENCOUNTER — TELEPHONE (OUTPATIENT)
Dept: CARDIOLOGY CLINIC | Age: 74
End: 2024-07-09

## 2024-07-09 VITALS
SYSTOLIC BLOOD PRESSURE: 133 MMHG | DIASTOLIC BLOOD PRESSURE: 61 MMHG | HEART RATE: 66 BPM | OXYGEN SATURATION: 94 % | RESPIRATION RATE: 16 BRPM

## 2024-07-09 DIAGNOSIS — R06.02 SOB (SHORTNESS OF BREATH): ICD-10-CM

## 2024-07-09 DIAGNOSIS — R07.2 PRECORDIAL PAIN: ICD-10-CM

## 2024-07-09 LAB
PERFORMED ON: NORMAL
POC CREATININE: 0.8 MG/DL (ref 0.6–1.2)
POC SAMPLE TYPE: NORMAL

## 2024-07-09 PROCEDURE — 75574 CT ANGIO HRT W/3D IMAGE: CPT

## 2024-07-09 PROCEDURE — 6360000004 HC RX CONTRAST MEDICATION: Performed by: INTERNAL MEDICINE

## 2024-07-09 PROCEDURE — 82565 ASSAY OF CREATININE: CPT

## 2024-07-09 RX ADMIN — IOPAMIDOL 100 ML: 755 INJECTION, SOLUTION INTRAVENOUS at 09:40

## 2024-07-09 NOTE — TELEPHONE ENCOUNTER
Relayed results to pt, pt v/u. Pt asked if it is okay that she is taking 4 metoprolol a day as prescribed but also synthroid that \"does the opposite and makes my heart rate go up\"   RKG please advise

## 2024-07-09 NOTE — TELEPHONE ENCOUNTER
----- Message from Gordon Munson MD sent at 7/9/2024  1:16 PM EDT -----  CT scan chest and heart shows no blockage in heart arteries.  ----- Message -----  From: Michael Sharif Incoming Radiology Results From [facility]  Sent: 7/9/2024  11:39 AM EDT  To: Gordon Munson MD

## 2024-07-09 NOTE — DISCHARGE INSTRUCTIONS
have not been eating this way, talk to your doctor. You also may want to talk to a dietitian. This expert can help you to learn about healthy foods and plan meals.   Medicines    Your doctor will tell you if and when you can restart your medicines. He or she will also give you instructions about taking any new medicines.     If you take aspirin or some other blood thinner, ask your doctor if and when to start taking it again. Make sure that you understand exactly what your doctor wants you to do.     Your doctor may/may not prescribe a blood-thinning medicine like aspirin or clopidogrel (Plavix). It is very important that you take these medicines exactly as directed in order to keep the coronary artery open and reduce your risk of a heart attack. Be safe with medicines. Call your doctor if you think you are having a problem with your medicine.     Follow-up care is a key part of your treatment and safety. Be sure to make and go to all appointments, and call your doctor if you are having problems. It's also a good idea to know your test results and keep a list of the medicines you take.  When should you call for help?   Call 911 anytime you think you may need emergency care. For example, call if:    You passed out (lost consciousness).     You have severe trouble breathing.     You have sudden chest pain and shortness of breath, or you cough up blood.     You have symptoms of a heart attack. These may include:  Chest pain or pressure, or a strange feeling in the chest.  Sweating.  Shortness of breath.  Nausea or vomiting.  Pain, pressure, or a strange feeling in the back, neck, jaw, or upper belly, or in one or both shoulders or arms.  Lightheadedness or sudden weakness.  A fast or irregular heartbeat. After you call 911, the  may tel you to chew 1 adult-strength or 2 to 4 low-dose aspirin. Wait for an ambulance. Do not try to drive yourself.     You have been diagnosed with angina, and you have symptoms that

## 2024-07-09 NOTE — PROGRESS NOTES
Patient arrived for Coronary CTA.     History and medications were reviewed with patient. Patient denies questions or concerns at this time.     Codeine    Past Medical History:   Diagnosis Date    Acromioclavicular joint arthritis 03/15/2018    CHF (congestive heart failure) (HCC)     Chronic back pain     Depression     w/Anxiety    HTN (hypertension)     Hyperlipidemia     Hypothyroid     Macular degeneration, age related     Sarcoidosis        Prior to Admission medications    Medication Sig Start Date End Date Taking? Authorizing Provider   ibuprofen (ADVIL;MOTRIN) 600 MG tablet Take 1 tablet by mouth 3 times daily as needed for Pain 6/30/24   Anny Sepulveda MD   metoprolol tartrate (LOPRESSOR) 25 MG tablet Take 2 tablets by mouth 2 times daily 6/12/24   Hunter Verdugo DO   lisinopril-hydroCHLOROthiazide (PRINZIDE;ZESTORETIC) 20-25 MG per tablet  4/3/24   Ani Delgado MD   Probiotic Product (PROBIOTIC PO) Take by mouth  Patient not taking: Reported on 4/8/2024    Ani Delgado MD   amLODIPine (NORVASC) 2.5 MG tablet Take 1 tablet by mouth daily 5/2/22   Ani Delgado MD   montelukast (SINGULAIR) 10 MG tablet TAKE 1 TABLET NIGHTLY 1/10/22   Safia Vieyra DO   simvastatin (ZOCOR) 20 MG tablet TAKE 1 TABLET NIGHTLY 11/18/21   Safia Vieyra DO   levothyroxine (SYNTHROID) 75 MCG tablet TAKE 1 TABLET DAILY 9/14/20   Safia Vieyra DO   escitalopram (LEXAPRO) 10 MG tablet TAKE 1 TABLET DAILY 12/12/19   Safia Vieyra DO   Multiple Vitamins-Minerals (WOMENS 50+ MULTI VITAMIN/MIN) TABS Take by mouth daily    Ani Delgado MD   omeprazole (PRILOSEC) 20 MG delayed release capsule Take 1 capsule by mouth daily    Ani Delgado MD   aspirin 81 MG tablet Take 1 tablet by mouth daily    Ani Delgado MD       Vitals:    07/09/24 0852   BP: 133/61   Pulse: 66   Resp: 16   SpO2: 94%       Patient took 50mg Metoprolol  last night: Yes   Patient took 50 mg of Metoprolol

## 2024-08-09 ENCOUNTER — TELEPHONE (OUTPATIENT)
Dept: PULMONOLOGY | Age: 74
End: 2024-08-09

## 2024-08-09 NOTE — TELEPHONE ENCOUNTER
Patient cancelled appointment on 8/13 with Dr. Delarosa for PFT/CT follow-up.    Reason: States her test results looked good so she doesn't want to reschedule.     Patient did not reschedule appointment.    Does not wish to reschedule.     Last OV 2/10/23   IMPRESSION:    1-sarcoidosis since 1998  2-Chest pain   3-Possible KENNETH     PLAN:      -will proceed with CT chest as she has some changes 2021  -will get PFT   -work chest as per cardiology ,she is to have cardiac MRI r/o cardiac sarcoidosis   -ACEI level   -if any further progression in CT then will considerct therapy/biopsy      Flu and Pneumovax as per primarily

## 2025-04-30 ENCOUNTER — TELEPHONE (OUTPATIENT)
Dept: CARDIOLOGY CLINIC | Age: 75
End: 2025-04-30

## 2025-04-30 ENCOUNTER — APPOINTMENT (OUTPATIENT)
Dept: CT IMAGING | Age: 75
End: 2025-04-30
Payer: MEDICARE

## 2025-04-30 ENCOUNTER — APPOINTMENT (OUTPATIENT)
Dept: GENERAL RADIOLOGY | Age: 75
End: 2025-04-30
Payer: MEDICARE

## 2025-04-30 ENCOUNTER — HOSPITAL ENCOUNTER (OUTPATIENT)
Age: 75
Setting detail: OBSERVATION
Discharge: HOME | End: 2025-05-02
Attending: STUDENT IN AN ORGANIZED HEALTH CARE EDUCATION/TRAINING PROGRAM | Admitting: INTERNAL MEDICINE
Payer: MEDICARE

## 2025-04-30 DIAGNOSIS — G47.39 SLEEP APNEA-LIKE BEHAVIOR: ICD-10-CM

## 2025-04-30 DIAGNOSIS — R09.02 HYPOXIA: ICD-10-CM

## 2025-04-30 DIAGNOSIS — R07.9 CHEST PAIN, UNSPECIFIED TYPE: Primary | ICD-10-CM

## 2025-04-30 DIAGNOSIS — R06.02 SHORTNESS OF BREATH: ICD-10-CM

## 2025-04-30 LAB
ALBUMIN SERPL-MCNC: 3.8 G/DL (ref 3.4–5)
ALBUMIN/GLOB SERPL: 1 {RATIO} (ref 1.1–2.2)
ALP SERPL-CCNC: 105 U/L (ref 40–129)
ALT SERPL-CCNC: 21 U/L (ref 10–40)
ANION GAP SERPL CALCULATED.3IONS-SCNC: 13 MMOL/L (ref 3–16)
AST SERPL-CCNC: 29 U/L (ref 15–37)
BASOPHILS # BLD: 0.1 K/UL (ref 0–0.2)
BASOPHILS NFR BLD: 0.9 %
BILIRUB SERPL-MCNC: 0.3 MG/DL (ref 0–1)
BUN SERPL-MCNC: 23 MG/DL (ref 7–20)
CALCIUM SERPL-MCNC: 9.8 MG/DL (ref 8.3–10.6)
CHLORIDE SERPL-SCNC: 102 MMOL/L (ref 99–110)
CO2 SERPL-SCNC: 24 MMOL/L (ref 21–32)
CREAT SERPL-MCNC: 0.8 MG/DL (ref 0.6–1.2)
D-DIMER QUANTITATIVE: 0.92 UG/ML FEU (ref 0–0.6)
DEPRECATED RDW RBC AUTO: 13 % (ref 12.4–15.4)
EKG ATRIAL RATE: 59 BPM
EKG DIAGNOSIS: NORMAL
EKG P AXIS: -27 DEGREES
EKG P-R INTERVAL: 198 MS
EKG Q-T INTERVAL: 424 MS
EKG QRS DURATION: 96 MS
EKG QTC CALCULATION (BAZETT): 419 MS
EKG R AXIS: -15 DEGREES
EKG T AXIS: -17 DEGREES
EKG VENTRICULAR RATE: 59 BPM
EOSINOPHIL # BLD: 0.5 K/UL (ref 0–0.6)
EOSINOPHIL NFR BLD: 6.5 %
GFR SERPLBLD CREATININE-BSD FMLA CKD-EPI: 77 ML/MIN/{1.73_M2}
GLUCOSE SERPL-MCNC: 109 MG/DL (ref 70–99)
HCT VFR BLD AUTO: 44.9 % (ref 36–48)
HGB BLD-MCNC: 15.2 G/DL (ref 12–16)
LYMPHOCYTES # BLD: 3 K/UL (ref 1–5.1)
LYMPHOCYTES NFR BLD: 38.5 %
MCH RBC QN AUTO: 33.4 PG (ref 26–34)
MCHC RBC AUTO-ENTMCNC: 33.8 G/DL (ref 31–36)
MCV RBC AUTO: 98.9 FL (ref 80–100)
MONOCYTES # BLD: 0.7 K/UL (ref 0–1.3)
MONOCYTES NFR BLD: 8.8 %
NEUTROPHILS # BLD: 3.5 K/UL (ref 1.7–7.7)
NEUTROPHILS NFR BLD: 45.3 %
NT-PROBNP SERPL-MCNC: 208 PG/ML (ref 0–449)
PLATELET # BLD AUTO: 294 K/UL (ref 135–450)
PMV BLD AUTO: 7.7 FL (ref 5–10.5)
POTASSIUM SERPL-SCNC: 4 MMOL/L (ref 3.5–5.1)
PROT SERPL-MCNC: 7.5 G/DL (ref 6.4–8.2)
RBC # BLD AUTO: 4.53 M/UL (ref 4–5.2)
SODIUM SERPL-SCNC: 139 MMOL/L (ref 136–145)
TROPONIN, HIGH SENSITIVITY: 10 NG/L (ref 0–14)
TROPONIN, HIGH SENSITIVITY: 7 NG/L (ref 0–14)
TSH SERPL DL<=0.005 MIU/L-ACNC: 2.18 UIU/ML (ref 0.27–4.2)
WBC # BLD AUTO: 7.7 K/UL (ref 4–11)

## 2025-04-30 PROCEDURE — 6370000000 HC RX 637 (ALT 250 FOR IP)

## 2025-04-30 PROCEDURE — 94761 N-INVAS EAR/PLS OXIMETRY MLT: CPT

## 2025-04-30 PROCEDURE — 84484 ASSAY OF TROPONIN QUANT: CPT

## 2025-04-30 PROCEDURE — 80053 COMPREHEN METABOLIC PANEL: CPT

## 2025-04-30 PROCEDURE — 99223 1ST HOSP IP/OBS HIGH 75: CPT

## 2025-04-30 PROCEDURE — G0378 HOSPITAL OBSERVATION PER HR: HCPCS

## 2025-04-30 PROCEDURE — 6360000002 HC RX W HCPCS

## 2025-04-30 PROCEDURE — 71045 X-RAY EXAM CHEST 1 VIEW: CPT

## 2025-04-30 PROCEDURE — 99285 EMERGENCY DEPT VISIT HI MDM: CPT

## 2025-04-30 PROCEDURE — 36415 COLL VENOUS BLD VENIPUNCTURE: CPT

## 2025-04-30 PROCEDURE — 93005 ELECTROCARDIOGRAM TRACING: CPT | Performed by: STUDENT IN AN ORGANIZED HEALTH CARE EDUCATION/TRAINING PROGRAM

## 2025-04-30 PROCEDURE — 85379 FIBRIN DEGRADATION QUANT: CPT

## 2025-04-30 PROCEDURE — 71260 CT THORAX DX C+: CPT

## 2025-04-30 PROCEDURE — 96372 THER/PROPH/DIAG INJ SC/IM: CPT

## 2025-04-30 PROCEDURE — 2500000003 HC RX 250 WO HCPCS

## 2025-04-30 PROCEDURE — 85025 COMPLETE CBC W/AUTO DIFF WBC: CPT

## 2025-04-30 PROCEDURE — 84443 ASSAY THYROID STIM HORMONE: CPT

## 2025-04-30 PROCEDURE — 2700000000 HC OXYGEN THERAPY PER DAY

## 2025-04-30 PROCEDURE — 83880 ASSAY OF NATRIURETIC PEPTIDE: CPT

## 2025-04-30 PROCEDURE — 93010 ELECTROCARDIOGRAM REPORT: CPT | Performed by: INTERNAL MEDICINE

## 2025-04-30 PROCEDURE — 6360000004 HC RX CONTRAST MEDICATION: Performed by: STUDENT IN AN ORGANIZED HEALTH CARE EDUCATION/TRAINING PROGRAM

## 2025-04-30 RX ORDER — POTASSIUM CHLORIDE 1500 MG/1
40 TABLET, EXTENDED RELEASE ORAL PRN
Status: DISCONTINUED | OUTPATIENT
Start: 2025-04-30 | End: 2025-05-02 | Stop reason: HOSPADM

## 2025-04-30 RX ORDER — POTASSIUM CHLORIDE 7.45 MG/ML
10 INJECTION INTRAVENOUS PRN
Status: DISCONTINUED | OUTPATIENT
Start: 2025-04-30 | End: 2025-05-02 | Stop reason: HOSPADM

## 2025-04-30 RX ORDER — ATORVASTATIN CALCIUM 10 MG/1
10 TABLET, FILM COATED ORAL DAILY
Status: DISCONTINUED | OUTPATIENT
Start: 2025-04-30 | End: 2025-05-02 | Stop reason: HOSPADM

## 2025-04-30 RX ORDER — ACETAMINOPHEN 325 MG/1
650 TABLET ORAL EVERY 6 HOURS PRN
Status: DISCONTINUED | OUTPATIENT
Start: 2025-04-30 | End: 2025-05-02 | Stop reason: HOSPADM

## 2025-04-30 RX ORDER — ESCITALOPRAM OXALATE 10 MG/1
10 TABLET ORAL DAILY
Status: DISCONTINUED | OUTPATIENT
Start: 2025-04-30 | End: 2025-05-01 | Stop reason: SDUPTHER

## 2025-04-30 RX ORDER — ONDANSETRON 4 MG/1
4 TABLET, ORALLY DISINTEGRATING ORAL EVERY 8 HOURS PRN
Status: DISCONTINUED | OUTPATIENT
Start: 2025-04-30 | End: 2025-05-02 | Stop reason: HOSPADM

## 2025-04-30 RX ORDER — IOPAMIDOL 755 MG/ML
85 INJECTION, SOLUTION INTRAVASCULAR
Status: COMPLETED | OUTPATIENT
Start: 2025-04-30 | End: 2025-04-30

## 2025-04-30 RX ORDER — GABAPENTIN 300 MG/1
300 CAPSULE ORAL NIGHTLY
Status: DISCONTINUED | OUTPATIENT
Start: 2025-04-30 | End: 2025-05-02 | Stop reason: HOSPADM

## 2025-04-30 RX ORDER — SODIUM CHLORIDE 0.9 % (FLUSH) 0.9 %
10 SYRINGE (ML) INJECTION PRN
Status: DISCONTINUED | OUTPATIENT
Start: 2025-04-30 | End: 2025-05-02 | Stop reason: HOSPADM

## 2025-04-30 RX ORDER — MAGNESIUM SULFATE 1 G/100ML
1000 INJECTION INTRAVENOUS PRN
Status: DISCONTINUED | OUTPATIENT
Start: 2025-04-30 | End: 2025-05-02 | Stop reason: HOSPADM

## 2025-04-30 RX ORDER — CETIRIZINE HYDROCHLORIDE 10 MG/1
10 TABLET ORAL DAILY
Status: DISCONTINUED | OUTPATIENT
Start: 2025-04-30 | End: 2025-05-02 | Stop reason: HOSPADM

## 2025-04-30 RX ORDER — ENOXAPARIN SODIUM 100 MG/ML
40 INJECTION SUBCUTANEOUS DAILY
Status: DISCONTINUED | OUTPATIENT
Start: 2025-04-30 | End: 2025-05-02 | Stop reason: HOSPADM

## 2025-04-30 RX ORDER — SODIUM CHLORIDE 0.9 % (FLUSH) 0.9 %
5-40 SYRINGE (ML) INJECTION EVERY 12 HOURS SCHEDULED
Status: DISCONTINUED | OUTPATIENT
Start: 2025-04-30 | End: 2025-05-02 | Stop reason: HOSPADM

## 2025-04-30 RX ORDER — GABAPENTIN 300 MG/1
300 CAPSULE ORAL NIGHTLY
COMMUNITY

## 2025-04-30 RX ORDER — ACETAMINOPHEN 650 MG/1
650 SUPPOSITORY RECTAL EVERY 6 HOURS PRN
Status: DISCONTINUED | OUTPATIENT
Start: 2025-04-30 | End: 2025-05-02 | Stop reason: HOSPADM

## 2025-04-30 RX ORDER — CELECOXIB 100 MG/1
100 CAPSULE ORAL 2 TIMES DAILY
Status: DISCONTINUED | OUTPATIENT
Start: 2025-04-30 | End: 2025-05-02 | Stop reason: HOSPADM

## 2025-04-30 RX ORDER — ONDANSETRON 2 MG/ML
4 INJECTION INTRAMUSCULAR; INTRAVENOUS EVERY 6 HOURS PRN
Status: DISCONTINUED | OUTPATIENT
Start: 2025-04-30 | End: 2025-05-02 | Stop reason: HOSPADM

## 2025-04-30 RX ORDER — PANTOPRAZOLE SODIUM 40 MG/1
40 TABLET, DELAYED RELEASE ORAL DAILY
COMMUNITY
Start: 2024-11-04

## 2025-04-30 RX ORDER — POLYETHYLENE GLYCOL 3350 17 G/17G
17 POWDER, FOR SOLUTION ORAL DAILY PRN
Status: DISCONTINUED | OUTPATIENT
Start: 2025-04-30 | End: 2025-05-02 | Stop reason: HOSPADM

## 2025-04-30 RX ORDER — HYDROCHLOROTHIAZIDE 25 MG/1
25 TABLET ORAL DAILY
Status: DISCONTINUED | OUTPATIENT
Start: 2025-04-30 | End: 2025-05-02 | Stop reason: HOSPADM

## 2025-04-30 RX ORDER — METOPROLOL TARTRATE 50 MG
50 TABLET ORAL 2 TIMES DAILY
Status: DISCONTINUED | OUTPATIENT
Start: 2025-04-30 | End: 2025-05-02 | Stop reason: HOSPADM

## 2025-04-30 RX ORDER — LISINOPRIL AND HYDROCHLOROTHIAZIDE 20; 25 MG/1; MG/1
1 TABLET ORAL DAILY
Status: DISCONTINUED | OUTPATIENT
Start: 2025-04-30 | End: 2025-04-30 | Stop reason: CLARIF

## 2025-04-30 RX ORDER — SODIUM CHLORIDE 9 MG/ML
INJECTION, SOLUTION INTRAVENOUS PRN
Status: DISCONTINUED | OUTPATIENT
Start: 2025-04-30 | End: 2025-05-02 | Stop reason: HOSPADM

## 2025-04-30 RX ORDER — CELECOXIB 100 MG/1
100 CAPSULE ORAL DAILY
Status: ON HOLD | COMMUNITY
Start: 2025-01-21 | End: 2025-05-02 | Stop reason: HOSPADM

## 2025-04-30 RX ORDER — CETIRIZINE HYDROCHLORIDE 10 MG/1
10 TABLET ORAL DAILY
COMMUNITY
Start: 2024-11-09

## 2025-04-30 RX ORDER — PANTOPRAZOLE SODIUM 40 MG/1
40 TABLET, DELAYED RELEASE ORAL DAILY
Status: DISCONTINUED | OUTPATIENT
Start: 2025-04-30 | End: 2025-05-02 | Stop reason: HOSPADM

## 2025-04-30 RX ORDER — LISINOPRIL 20 MG/1
20 TABLET ORAL DAILY
Status: DISCONTINUED | OUTPATIENT
Start: 2025-04-30 | End: 2025-05-02 | Stop reason: HOSPADM

## 2025-04-30 RX ORDER — NITROGLYCERIN 0.4 MG/1
0.4 TABLET SUBLINGUAL EVERY 5 MIN PRN
Status: DISCONTINUED | OUTPATIENT
Start: 2025-04-30 | End: 2025-05-02 | Stop reason: HOSPADM

## 2025-04-30 RX ORDER — ASPIRIN 81 MG/1
81 TABLET, CHEWABLE ORAL DAILY
Status: DISCONTINUED | OUTPATIENT
Start: 2025-05-01 | End: 2025-05-02 | Stop reason: HOSPADM

## 2025-04-30 RX ADMIN — Medication 10 ML: at 20:39

## 2025-04-30 RX ADMIN — GABAPENTIN 300 MG: 300 CAPSULE ORAL at 20:37

## 2025-04-30 RX ADMIN — ENOXAPARIN SODIUM 40 MG: 100 INJECTION SUBCUTANEOUS at 18:51

## 2025-04-30 RX ADMIN — CELECOXIB 100 MG: 100 CAPSULE ORAL at 20:37

## 2025-04-30 RX ADMIN — IOPAMIDOL 85 ML: 755 INJECTION, SOLUTION INTRAVENOUS at 14:56

## 2025-04-30 ASSESSMENT — LIFESTYLE VARIABLES
HOW MANY STANDARD DRINKS CONTAINING ALCOHOL DO YOU HAVE ON A TYPICAL DAY: PATIENT DOES NOT DRINK
HOW OFTEN DO YOU HAVE A DRINK CONTAINING ALCOHOL: NEVER
HOW MANY STANDARD DRINKS CONTAINING ALCOHOL DO YOU HAVE ON A TYPICAL DAY: PATIENT DOES NOT DRINK

## 2025-04-30 ASSESSMENT — PAIN - FUNCTIONAL ASSESSMENT: PAIN_FUNCTIONAL_ASSESSMENT: NONE - DENIES PAIN

## 2025-04-30 NOTE — TELEPHONE ENCOUNTER
Severe chest pain for 2 minutes last night 7/10.  Pressure lasted 7- 8 hours.    She notes that currently feeling sob.  Referred patient to ER and that if she did not have a  to call 911. She VU.

## 2025-04-30 NOTE — PROGRESS NOTES
4 Eyes Skin Assessment     NAME:  Nayeli Gottlieb  YOB: 1950  MEDICAL RECORD NUMBER:  [id number]    The patient is being assessed for  Admission    I agree that at least one RN has performed a thorough Head to Toe Skin Assessment on the patient. ALL assessment sites listed below have been assessed.      Areas assessed by both nurses:    Head, Face, Ears, Shoulders, Back, Chest, Arms, Elbows, Hands, Sacrum. Buttock, Coccyx, Ischium, Legs. Feet and Heels, and Under Medical Devices         Does the Patient have a Wound? No noted wound(s)       Arturo Prevention initiated by RN: No  Wound Care Orders initiated by RN: No    Pressure Injury (Stage 3,4, Unstageable, DTI, NWPT, and Complex wounds) if present, place Wound referral order by RN under : No    New Ostomies, if present place, Ostomy referral order under : No     Nurse 1 eSignature: Electronically signed by Madeleine Malik RN on 4/30/25 at 6:25 PM EDT    **SHARE this note so that the co-signing nurse can place an eSignature**    Nurse 2 eSignature: Electronically signed by Zayra Doyle RN on 4/30/25 at 6:27 PM EDT

## 2025-04-30 NOTE — H&P
[facility] Medicine History & Physical      PCP: Talisha Soriano, GAURI - CNP    Date of Admission: 4/30/2025    Date of Service: Pt seen/examined on 04/30/25    Chief Complaint:    Chief Complaint   Patient presents with    Chest Pain     Patient presents to the ER with chest pain and SOB since last night.          History Of Present Illness:      The patient is a 74 y.o. female with a PMHx of hypertension, CHF, chronic back pain, hyperlipidemia, hypothyroidism who presents to [facility] with chest pain.  Patient reports that last night around 7 PM, she went to walk to her bathroom, came back to her chair and sat down.  Reports that she had 2 minutes of sharp chest pain, located in the center of her chest, without radiation.  From that point on, until about 3 AM, had a \"pressure\" sensation in the middle of her chest.  Nothing made this better or worse.  Not associated with any nausea, vomiting, or diaphoresis, but body does feel generally weak. Was finally able to fall asleep around 3 AM.  Woke up around 7:30 AM.  The chest pain was gone, however, was significantly dyspneic on exertion.  Unable to walk more than a few steps without becoming significantly short of breath.  Reports that she called her cardiologist, Dr. Munson's, office, and was told to come to ED for further evaluation.  She does have intermittent periods of shortness of breath, however, has not had any recently.  Says that she mows her own lawn and plants her own flowers, has never been short of breath like this.    Denies any coughing, palpitations, abdominal pain, diarrhea, syncope  History obtained from the patient and review of EMR.     Past Medical History:        Diagnosis Date    Acromioclavicular joint arthritis 03/15/2018    CHF (congestive heart failure) (HCC)     Chronic back pain     Depression     w/Anxiety    HTN (hypertension)     Hyperlipidemia     Hypothyroid     Macular degeneration, age related     Sarcoidosis        Past Surgical  higher risk for morbidity and mortality requiring testing and treatment.   DVT Prophylaxis: lovenox  Diet: No diet orders on file  Code Status: Prior    Tatum Cleaning PA-C 4/30/2025 4:26 PM

## 2025-04-30 NOTE — ED NOTES
I gave report to Madeleine BINGHAM from PCU at this time over the phone. I was assisting in a procedure when they came to get the patient.

## 2025-04-30 NOTE — PROGRESS NOTES
Patient admitted to room 317 from ER. Patient oriented to room, call light, bed rails, phone, lights and bathroom. Patient instructed about the schedule of the day including: vital sign frequency, lab draws, possible tests, frequency of MD and staff rounds, daily weights, I &O's and prescribed diet.  Telemetry box in place, patient aware of placement and reason. Bed locked, in lowest position, side rails up 2/4, call light within reach.        Recliner Assessment  Patient is able to demonstrate the ability to move from a reclining position to an upright position within the recliner.

## 2025-04-30 NOTE — ED PROVIDER NOTES
[facility] EMERGENCY DEPARTMENT  EMERGENCY DEPARTMENT ENCOUNTER      Pt Name: Nayeli Gottlieb  MRN: [id number]  Birthdate 1950  Date of evaluation: 2025  Provider: Charlie Soni MD  4:10 PM    CHIEF COMPLAINT       Chief Complaint   Patient presents with    Chest Pain     Patient presents to the ER with chest pain and SOB since last night.          HISTORY OF PRESENT ILLNESS    Nayeli Gottlieb is a 74 y.o. female who presents to the emergency department past medical history of congestive heart failure, hypertension, hyperlipidemia presenting with chest pain and shortness of breath.  She states last night she walked across her house and became short of breath with chest pain.  Chest pain has resolved but she continues to be dyspneic.  Denies history of DVT, CAD.    HPI    Nursing Notes were reviewed.    REVIEW OF SYSTEMS       Review of Systems   Constitutional:  Negative for fever.       Except as noted above the remainder of the review of systems was reviewed and negative.       PAST MEDICAL HISTORY     Past Medical History:   Diagnosis Date    Acromioclavicular joint arthritis 03/15/2018    CHF (congestive heart failure) (HCC)     Chronic back pain     Depression     w/Anxiety    HTN (hypertension)     Hyperlipidemia     Hypothyroid     Macular degeneration, age related     Sarcoidosis          SURGICAL HISTORY       Past Surgical History:   Procedure Laterality Date    BRONCHOSCOPY       SECTION      LUNG BIOPSY      SHOULDER ARTHROSCOPY Left 2020    LEFT SHOULDER VIDEO ARTHROSCOPY, DISTAL CLAVICLE EXCISION, SUBACROMIAL DECOMPRESSION, ROTATOR CUFF REPAIR WITH ROTATION MEDICAL GRAFT performed by Alice Ferguson DO at [facility] OR    SHOULDER SURGERY Left 2020    LEFT SHOULDER VIDEO ARTHROSCOPY, DISTAL CLAVICLE EXCISION, SUBACROMIAL DECOMPRESSION, ROTATOR CUFF REPAIR WITH ROTATION MEDICAL GRAFT         CURRENT MEDICATIONS       Previous Medications    AMLODIPINE (NORVASC) 2.5 MG  Pulse Respirations SpO2   (!) 143/77 -- -- 97.7 °F (36.5 °C) Oral 60 16 92 %      Height Weight         -- --             Physical Exam  Constitutional:       General: She is not in acute distress.     Appearance: Normal appearance. She is not ill-appearing, toxic-appearing or diaphoretic.   Cardiovascular:      Rate and Rhythm: Normal rate.      Pulses: Normal pulses.   Pulmonary:      Comments: Lungs clear bilaterally, no work of breathing noted  Musculoskeletal:         General: No swelling.   Neurological:      Mental Status: She is alert.         DIAGNOSTIC RESULTS     EKG: All EKG's are interpreted by the Emergency Department Physician who either signs or Co-signs this chart in the absence of a cardiologist.        RADIOLOGY:   Non-plain film images such as CT, Ultrasound and MRI are read by the radiologist. Plain radiographic images are visualized and preliminarily interpreted by the emergency physician with the below findings:        Interpretation per the Radiologist below, if available at the time of this note:    CT CHEST PULMONARY EMBOLISM W CONTRAST   Final Result   1.  No acute pulmonary embolism.   2. Mild main pulmonary artery dilatation can be seen with pulmonary   hypertension but is nonspecific.   3. No acute pulmonary disease.   4. There are 2, stable, nonspecific 5 mm or less soft tissue pulmonary   nodules right upper lobe.  Correlate with lung cancer risk or clinical   history of known malignancy.  May consider noncontrast chest CT surveillance   at 1 year.  Most commonly these are sequela of prior infectious/inflammatory   process such as granulomatous disease.   5. Cholelithiasis without findings of acute cholecystitis.         XR CHEST PORTABLE   Final Result   No acute cardiopulmonary process.               ED BEDSIDE ULTRASOUND:   Performed by ED Physician - none    LABS:  Labs Reviewed   COMPREHENSIVE METABOLIC PANEL W/ REFLEX TO MG FOR LOW K - Abnormal; Notable for the following

## 2025-04-30 NOTE — ED NOTES
Patients EKG completed and the patient is on the bedside cardiac monitor with alarms on and audible.

## 2025-05-01 ENCOUNTER — APPOINTMENT (OUTPATIENT)
Dept: NUCLEAR MEDICINE | Age: 75
End: 2025-05-01
Payer: MEDICARE

## 2025-05-01 ENCOUNTER — APPOINTMENT (OUTPATIENT)
Age: 75
End: 2025-05-01
Payer: MEDICARE

## 2025-05-01 LAB
ANION GAP SERPL CALCULATED.3IONS-SCNC: 11 MMOL/L (ref 3–16)
BUN SERPL-MCNC: 20 MG/DL (ref 7–20)
CALCIUM SERPL-MCNC: 8.6 MG/DL (ref 8.3–10.6)
CHLORIDE SERPL-SCNC: 102 MMOL/L (ref 99–110)
CHOLEST SERPL-MCNC: 149 MG/DL (ref 0–199)
CO2 SERPL-SCNC: 27 MMOL/L (ref 21–32)
CREAT SERPL-MCNC: 0.9 MG/DL (ref 0.6–1.2)
DEPRECATED RDW RBC AUTO: 12.9 % (ref 12.4–15.4)
ECHO AO ASC DIAM: 3.2 CM
ECHO AO ASCENDING AORTA INDEX: 1.66 CM/M2
ECHO AO ROOT DIAM: 3.1 CM
ECHO AO ROOT INDEX: 1.61 CM/M2
ECHO AV ACCELERATION TIME: 69 MS
ECHO AV AREA PEAK VELOCITY: 1.7 CM2
ECHO AV AREA VTI: 1.8 CM2
ECHO AV AREA/BSA PEAK VELOCITY: 0.9 CM2/M2
ECHO AV AREA/BSA VTI: 0.9 CM2/M2
ECHO AV MEAN GRADIENT: 6 MMHG
ECHO AV MEAN GRADIENT: 6 MMHG
ECHO AV MEAN VELOCITY: 1.2 M/S
ECHO AV PEAK GRADIENT: 11 MMHG
ECHO AV PEAK VELOCITY: 1.7 M/S
ECHO AV VELOCITY RATIO: 0.59
ECHO AV VTI: 39.8 CM
ECHO BSA: 2 M2
ECHO BSA: 2 M2
ECHO EST RA PRESSURE: 3 MMHG
ECHO IVC PROX: 1.8 CM
ECHO LA AREA 2C: 18.5 CM2
ECHO LA AREA 4C: 18.8 CM2
ECHO LA DIAMETER INDEX: 1.87 CM/M2
ECHO LA DIAMETER: 3.6 CM
ECHO LA MAJOR AXIS: 5.3 CM
ECHO LA MINOR AXIS: 5.2 CM
ECHO LA TO AORTIC ROOT RATIO: 1.16
ECHO LA VOL BP: 54 ML (ref 22–52)
ECHO LA VOL MOD A2C: 54 ML (ref 22–52)
ECHO LA VOL MOD A4C: 54 ML (ref 22–52)
ECHO LA VOL/BSA BIPLANE: 28 ML/M2 (ref 16–34)
ECHO LA VOLUME INDEX MOD A2C: 28 ML/M2 (ref 16–34)
ECHO LA VOLUME INDEX MOD A4C: 28 ML/M2 (ref 16–34)
ECHO LV E' LATERAL VELOCITY: 6.09 CM/S
ECHO LV E' SEPTAL VELOCITY: 6.74 CM/S
ECHO LV EDV A2C: 100 ML
ECHO LV EDV A4C: 102 ML
ECHO LV EDV INDEX A4C: 53 ML/M2
ECHO LV EDV NDEX A2C: 52 ML/M2
ECHO LV EF PHYSICIAN: 58 %
ECHO LV EJECTION FRACTION A2C: 64 %
ECHO LV EJECTION FRACTION A4C: 63 %
ECHO LV ESV A2C: 36 ML
ECHO LV ESV A4C: 38 ML
ECHO LV ESV INDEX A2C: 19 ML/M2
ECHO LV ESV INDEX A4C: 20 ML/M2
ECHO LV FRACTIONAL SHORTENING: 35 % (ref 28–44)
ECHO LV INTERNAL DIMENSION DIASTOLE INDEX: 2.69 CM/M2
ECHO LV INTERNAL DIMENSION DIASTOLIC: 5.2 CM (ref 3.9–5.3)
ECHO LV INTERNAL DIMENSION SYSTOLIC INDEX: 1.76 CM/M2
ECHO LV INTERNAL DIMENSION SYSTOLIC: 3.4 CM
ECHO LV IVSD: 0.7 CM (ref 0.6–0.9)
ECHO LV MASS 2D: 133.8 G (ref 67–162)
ECHO LV MASS INDEX 2D: 69.4 G/M2 (ref 43–95)
ECHO LV POSTERIOR WALL DIASTOLIC: 0.8 CM (ref 0.6–0.9)
ECHO LV RELATIVE WALL THICKNESS RATIO: 0.31
ECHO LVOT AREA: 2.8 CM2
ECHO LVOT AV VTI INDEX: 0.62
ECHO LVOT DIAM: 1.9 CM
ECHO LVOT MEAN GRADIENT: 2 MMHG
ECHO LVOT PEAK GRADIENT: 4 MMHG
ECHO LVOT PEAK VELOCITY: 1 M/S
ECHO LVOT STROKE VOLUME INDEX: 36 ML/M2
ECHO LVOT SV: 69.4 ML
ECHO LVOT VTI: 24.5 CM
ECHO MV A VELOCITY: 1.12 M/S
ECHO MV AREA VTI: 1.7 CM2
ECHO MV E DECELERATION TIME (DT): 320 MS
ECHO MV E VELOCITY: 0.83 M/S
ECHO MV E/A RATIO: 0.74
ECHO MV E/E' LATERAL: 13.63
ECHO MV E/E' RATIO (AVERAGED): 12.97
ECHO MV E/E' SEPTAL: 12.31
ECHO MV LVOT VTI INDEX: 1.71
ECHO MV MAX VELOCITY: 1.1 M/S
ECHO MV MEAN GRADIENT: 2 MMHG
ECHO MV MEAN VELOCITY: 0.6 M/S
ECHO MV PEAK GRADIENT: 5 MMHG
ECHO MV VTI: 41.9 CM
ECHO PV ACCELERATION TIME (AT): 116 MS
ECHO PV MAX VELOCITY: 1.1 M/S
ECHO PV PEAK GRADIENT: 5 MMHG
ECHO RA AREA 4C: 9.3 CM2
ECHO RA END SYSTOLIC VOLUME APICAL 4 CHAMBER INDEX BSA: 8 ML/M2
ECHO RA VOLUME: 15 ML
ECHO RV BASAL DIMENSION: 2 CM
ECHO RV FREE WALL PEAK S': 12.5 CM/S
ECHO RV INTERNAL DIMENSION: 3.3 CM
ECHO RV LONGITUDINAL DIMENSION: 6.3 CM
ECHO RV MID DIMENSION: 2.3 CM
ECHO RV TAPSE: 2.5 CM (ref 1.7–?)
ECHO TV E WAVE: 0.4 M/S
EKG ATRIAL RATE: 48 BPM
EKG DIAGNOSIS: NORMAL
EKG P AXIS: 40 DEGREES
EKG P-R INTERVAL: 200 MS
EKG Q-T INTERVAL: 452 MS
EKG QRS DURATION: 102 MS
EKG QTC CALCULATION (BAZETT): 403 MS
EKG R AXIS: -23 DEGREES
EKG T AXIS: 23 DEGREES
EKG VENTRICULAR RATE: 48 BPM
GFR SERPLBLD CREATININE-BSD FMLA CKD-EPI: 67 ML/MIN/{1.73_M2}
GLUCOSE SERPL-MCNC: 100 MG/DL (ref 70–99)
HCT VFR BLD AUTO: 43 % (ref 36–48)
HDLC SERPL-MCNC: 39 MG/DL (ref 40–60)
HGB BLD-MCNC: 14.3 G/DL (ref 12–16)
LDLC SERPL CALC-MCNC: 82 MG/DL
MAGNESIUM SERPL-MCNC: 1.88 MG/DL (ref 1.8–2.4)
MCH RBC QN AUTO: 32.8 PG (ref 26–34)
MCHC RBC AUTO-ENTMCNC: 33.2 G/DL (ref 31–36)
MCV RBC AUTO: 98.7 FL (ref 80–100)
NUC STRESS EJECTION FRACTION: 69 %
NUC STRESS LV EDV: 75 ML (ref 56–104)
NUC STRESS LV ESV: 23 ML (ref 19–49)
NUC STRESS LV MASS: 109 G
PLATELET # BLD AUTO: 261 K/UL (ref 135–450)
PMV BLD AUTO: 7.8 FL (ref 5–10.5)
POTASSIUM SERPL-SCNC: 3.5 MMOL/L (ref 3.5–5.1)
RBC # BLD AUTO: 4.36 M/UL (ref 4–5.2)
SODIUM SERPL-SCNC: 140 MMOL/L (ref 136–145)
STRESS BASELINE DIAS BP: 70 MMHG
STRESS BASELINE HR: 62 BPM
STRESS BASELINE SYS BP: 134 MMHG
STRESS ESTIMATED WORKLOAD: 1 METS
STRESS PEAK DIAS BP: 73 MMHG
STRESS PEAK SYS BP: 136 MMHG
STRESS PERCENT HR ACHIEVED: 62 %
STRESS POST PEAK HR: 90 BPM
STRESS RATE PRESSURE PRODUCT: NORMAL BPM*MMHG
STRESS TARGET HR: 146 BPM
TRIGL SERPL-MCNC: 142 MG/DL (ref 0–150)
VLDLC SERPL CALC-MCNC: 28 MG/DL
WBC # BLD AUTO: 6.6 K/UL (ref 4–11)

## 2025-05-01 PROCEDURE — 93016 CV STRESS TEST SUPVJ ONLY: CPT | Performed by: INTERNAL MEDICINE

## 2025-05-01 PROCEDURE — 83735 ASSAY OF MAGNESIUM: CPT

## 2025-05-01 PROCEDURE — G0378 HOSPITAL OBSERVATION PER HR: HCPCS

## 2025-05-01 PROCEDURE — 6370000000 HC RX 637 (ALT 250 FOR IP): Performed by: INTERNAL MEDICINE

## 2025-05-01 PROCEDURE — 85027 COMPLETE CBC AUTOMATED: CPT

## 2025-05-01 PROCEDURE — 36415 COLL VENOUS BLD VENIPUNCTURE: CPT

## 2025-05-01 PROCEDURE — 6360000002 HC RX W HCPCS

## 2025-05-01 PROCEDURE — C8929 TTE W OR WO FOL WCON,DOPPLER: HCPCS

## 2025-05-01 PROCEDURE — 2500000003 HC RX 250 WO HCPCS

## 2025-05-01 PROCEDURE — 78452 HT MUSCLE IMAGE SPECT MULT: CPT

## 2025-05-01 PROCEDURE — 80048 BASIC METABOLIC PNL TOTAL CA: CPT

## 2025-05-01 PROCEDURE — 93018 CV STRESS TEST I&R ONLY: CPT | Performed by: INTERNAL MEDICINE

## 2025-05-01 PROCEDURE — 80061 LIPID PANEL: CPT

## 2025-05-01 PROCEDURE — 6360000004 HC RX CONTRAST MEDICATION: Performed by: STUDENT IN AN ORGANIZED HEALTH CARE EDUCATION/TRAINING PROGRAM

## 2025-05-01 PROCEDURE — 6370000000 HC RX 637 (ALT 250 FOR IP)

## 2025-05-01 PROCEDURE — 93010 ELECTROCARDIOGRAM REPORT: CPT | Performed by: INTERNAL MEDICINE

## 2025-05-01 PROCEDURE — 3430000000 HC RX DIAGNOSTIC RADIOPHARMACEUTICAL: Performed by: INTERNAL MEDICINE

## 2025-05-01 PROCEDURE — 3430000000 HC RX DIAGNOSTIC RADIOPHARMACEUTICAL

## 2025-05-01 PROCEDURE — 93306 TTE W/DOPPLER COMPLETE: CPT | Performed by: INTERNAL MEDICINE

## 2025-05-01 PROCEDURE — 78452 HT MUSCLE IMAGE SPECT MULT: CPT | Performed by: INTERNAL MEDICINE

## 2025-05-01 PROCEDURE — A9502 TC99M TETROFOSMIN: HCPCS

## 2025-05-01 PROCEDURE — 94761 N-INVAS EAR/PLS OXIMETRY MLT: CPT

## 2025-05-01 PROCEDURE — 2700000000 HC OXYGEN THERAPY PER DAY

## 2025-05-01 PROCEDURE — 99232 SBSQ HOSP IP/OBS MODERATE 35: CPT | Performed by: INTERNAL MEDICINE

## 2025-05-01 PROCEDURE — 93017 CV STRESS TEST TRACING ONLY: CPT

## 2025-05-01 PROCEDURE — A9502 TC99M TETROFOSMIN: HCPCS | Performed by: INTERNAL MEDICINE

## 2025-05-01 RX ORDER — ESCITALOPRAM OXALATE 10 MG/1
10 TABLET ORAL DAILY
Status: DISCONTINUED | OUTPATIENT
Start: 2025-05-01 | End: 2025-05-02 | Stop reason: HOSPADM

## 2025-05-01 RX ORDER — AMINOPHYLLINE 25 MG/ML
100 INJECTION, SOLUTION INTRAVENOUS ONCE
Status: COMPLETED | OUTPATIENT
Start: 2025-05-01 | End: 2025-05-01

## 2025-05-01 RX ORDER — REGADENOSON 0.08 MG/ML
0.4 INJECTION, SOLUTION INTRAVENOUS
Status: DISCONTINUED | OUTPATIENT
Start: 2025-05-01 | End: 2025-05-02 | Stop reason: HOSPADM

## 2025-05-01 RX ORDER — REGADENOSON 0.08 MG/ML
0.4 INJECTION, SOLUTION INTRAVENOUS
Status: COMPLETED | OUTPATIENT
Start: 2025-05-01 | End: 2025-05-01

## 2025-05-01 RX ADMIN — Medication 10 ML: at 09:31

## 2025-05-01 RX ADMIN — PANTOPRAZOLE SODIUM 40 MG: 40 TABLET, DELAYED RELEASE ORAL at 09:29

## 2025-05-01 RX ADMIN — CELECOXIB 100 MG: 100 CAPSULE ORAL at 20:11

## 2025-05-01 RX ADMIN — TETROFOSMIN 10.5 MILLICURIE: 1.38 INJECTION, POWDER, LYOPHILIZED, FOR SOLUTION INTRAVENOUS at 12:54

## 2025-05-01 RX ADMIN — ATORVASTATIN CALCIUM 10 MG: 10 TABLET, FILM COATED ORAL at 09:29

## 2025-05-01 RX ADMIN — GABAPENTIN 300 MG: 300 CAPSULE ORAL at 20:11

## 2025-05-01 RX ADMIN — ESCITALOPRAM OXALATE 10 MG: 10 TABLET ORAL at 09:33

## 2025-05-01 RX ADMIN — ASPIRIN 81 MG: 81 TABLET, CHEWABLE ORAL at 09:28

## 2025-05-01 RX ADMIN — LISINOPRIL 20 MG: 20 TABLET ORAL at 09:29

## 2025-05-01 RX ADMIN — SULFUR HEXAFLUORIDE 2 ML: KIT at 06:58

## 2025-05-01 RX ADMIN — Medication 10 ML: at 20:13

## 2025-05-01 RX ADMIN — REGADENOSON 0.4 MG: 0.08 INJECTION, SOLUTION INTRAVENOUS at 14:05

## 2025-05-01 RX ADMIN — CELECOXIB 100 MG: 100 CAPSULE ORAL at 09:29

## 2025-05-01 RX ADMIN — TETROFOSMIN 31.2 MILLICURIE: 1.38 INJECTION, POWDER, LYOPHILIZED, FOR SOLUTION INTRAVENOUS at 14:05

## 2025-05-01 RX ADMIN — HYDROCHLOROTHIAZIDE 25 MG: 25 TABLET ORAL at 09:29

## 2025-05-01 RX ADMIN — LEVOTHYROXINE SODIUM 75 MCG: 0.05 TABLET ORAL at 09:28

## 2025-05-01 RX ADMIN — AMINOPHYLLINE 100 MG: 25 INJECTION, SOLUTION INTRAVENOUS at 14:13

## 2025-05-01 RX ADMIN — CETIRIZINE HYDROCHLORIDE 10 MG: 10 TABLET ORAL at 09:29

## 2025-05-01 ASSESSMENT — PAIN DESCRIPTION - ORIENTATION: ORIENTATION: LOWER

## 2025-05-01 ASSESSMENT — PAIN SCALES - GENERAL: PAINLEVEL_OUTOF10: 0

## 2025-05-01 ASSESSMENT — PAIN DESCRIPTION - LOCATION: LOCATION: BACK

## 2025-05-01 ASSESSMENT — PAIN DESCRIPTION - DESCRIPTORS: DESCRIPTORS: ACHING

## 2025-05-01 NOTE — PROGRESS NOTES
Transferred care to ZACHERY Bee. Face to face bedside report given, no need voiced at this time.  Pt awake in bed.   No signs of distress noted.  Call light within reach.   Denies needs.

## 2025-05-01 NOTE — PLAN OF CARE
Problem: Hematologic - Adult  Goal: Maintains hematologic stability  Outcome: Progressing  Flowsheets (Taken 4/30/2025 2156)  Maintains hematologic stability: Assess for signs and symptoms of bleeding or hemorrhage     Problem: Respiratory - Adult  Goal: Achieves optimal ventilation and oxygenation  Outcome: Progressing  Flowsheets (Taken 4/30/2025 2156)  Achieves optimal ventilation and oxygenation: Assess for changes in respiratory status

## 2025-05-01 NOTE — PROGRESS NOTES
Vitals:    05/01/25 0425   BP: (!) 104/58   Pulse: 54   Resp: 18   Temp: 97.3 °F (36.3 °C)   SpO2: 97%     Patient is asleep but easy to wake up.  On O2 at 2L/min - tolerated well.  Call light within reach.

## 2025-05-01 NOTE — FLOWSHEET NOTE
04/30/25 1935   Vital Signs   Temp 97.8 °F (36.6 °C)   Temp Source Oral   Pulse 59   Heart Rate Source Monitor   Respirations 16   /65   MAP (Calculated) 82   BP Location Right upper arm   BP Method Automatic   Patient Position Semi fowlers   Pain Assessment   Pain Assessment None - Denies Pain   Opioid-Induced Sedation   POSS Score 1   RASS   Morel Agitation Sedation Scale (RASS) 0   Oxygen Therapy   SpO2 97 %   O2 Device Nasal cannula   O2 Flow Rate (L/min) 2 L/min     Assessment done and in flowsheets.  Patient is alert and oriented x4.  O2 at 2L/min - stable and easy breathing.  Call light within reach.

## 2025-05-01 NOTE — FLOWSHEET NOTE
05/01/25 1602   Vital Signs   Temp 97.9 °F (36.6 °C)   Temp Source Oral   Pulse 72   Heart Rate Source Monitor   Respirations 18   BP (!) 141/74   MAP (Calculated) 96   BP Location Right upper arm   BP Method Automatic   Oxygen Therapy   SpO2 95 %   O2 Device Nasal cannula   O2 Flow Rate (L/min) 2 L/min     Patient is awake and alert. Returned from Stress. Call light within reach.

## 2025-05-01 NOTE — FLOWSHEET NOTE
05/01/25 1109   Vital Signs   Temp 97.9 °F (36.6 °C)   Temp Source Oral   Pulse 60   Heart Rate Source Monitor   Respirations 18   BP (!) 103/43   MAP (Calculated) 63   BP Location Right upper arm   BP Method Automatic   Patient Position Lying right side   Oxygen Therapy   SpO2 97 %   O2 Device Nasal cannula   O2 Flow Rate (L/min) 2 L/min     Patient remains stable. Call light within reach.

## 2025-05-01 NOTE — PROGRESS NOTES
[facility] Progress Note    Daily Progress Note for 2025 10:32 AM /0317-01  Nayeli Gottlieb : 1950 Age: 74 y.o. Sex: female  Length of Stay:  0    Interval History:      CC: F/U Chest Pain (Patient presents to the ER with chest pain and SOB since last night. )    Subjective:       No further chest pain but she does have shortness of breath with activity. Chronic dry cough. She is taking more allergy and GERD meds for this.     Objective:     Vitals:    25 2325 25 0425 25 0628 25 0732   BP: (!) 109/52 (!) 104/58 (!) 104/58 122/69   Pulse: 93 54  63   Resp: 16 18  18   Temp: 97 °F (36.1 °C) 97.3 °F (36.3 °C)  97.7 °F (36.5 °C)   TempSrc: Oral Axillary  Oral   SpO2: 95% 97%  97%   Weight:   90.3 kg (199 lb)    Height:   1.6 m (5' 3\")           Intake/Output Summary (Last 24 hours) at 2025 1032  Last data filed at 2025 0838  Gross per 24 hour   Intake 722 ml   Output 300 ml   Net 422 ml     Body mass index is 35.25 kg/m².    Physical Exam:  General: Cooperative, pleasant/Ill appearing, on  Nasal cannula  HEENT:  Head: normocephalic,atraumatic, anicteric sclera, clear conjunctiva  Neck: Normal size, Jugular venous pulsations: normal  Respiratory:unlabored breathing, clear to auscultation with no crackles, wheezes rhonchi  Heart: Regular rate and rhythm, S1, S2-normal, No murmurs  Abdomen: soft, nondistended, nontender, normoactive bowel sounds,  Neurological/Psych: Alert and oriented times three, no focal neurological deficits, Mood and affect appropriate.  Skin: No obvious rashes    Extremities:  no edema, Pedal pulses 2+ bilaterally    Scheduled Medications:  escitalopram, 10 mg, Daily  levothyroxine, 75 mcg, Daily  [Held by provider] metoprolol tartrate, 50 mg, BID  atorvastatin, 10 mg, Daily  celecoxib, 100 mg, BID  cetirizine, 10 mg, Daily  sodium chloride flush, 5-40 mL, 2 times per day  aspirin, 81 mg, Daily  enoxaparin, 40 mg, Daily  gabapentin, 300

## 2025-05-01 NOTE — NURSING NOTE
A lexiscan stress test was completed on this patient as ordered. Aminophylline 100 mg IVP per nuclear tech for c/o sob & nausea with good effect. Awaiting stress imaging with nuclear medicine department at this time.

## 2025-05-01 NOTE — PROGRESS NOTES
Bedside report and transfer of care given to ZACHERY Allison. Pt currently resting in bed with the call light within reach. Pt denies any other care needs at this time. Pt stable at this time.

## 2025-05-01 NOTE — FLOWSHEET NOTE
05/01/25 0732   Vital Signs   Temp 97.7 °F (36.5 °C)   Temp Source Oral   Pulse 63   Heart Rate Source Monitor   Respirations 18   /69   MAP (Calculated) 87   BP Location Right upper arm   BP Method Automatic   Patient Position Semi fowlers   Oxygen Therapy   SpO2 97 %   O2 Device Nasal cannula   O2 Flow Rate (L/min) 2 L/min     Shift assessment completed. See flow sheet. Medications given. Patient is A&O x4. Vitals are stable. Patient denies further needs. Call light within reach.

## 2025-05-02 VITALS
SYSTOLIC BLOOD PRESSURE: 130 MMHG | OXYGEN SATURATION: 92 % | RESPIRATION RATE: 16 BRPM | WEIGHT: 199.7 LBS | BODY MASS INDEX: 35.38 KG/M2 | HEART RATE: 81 BPM | DIASTOLIC BLOOD PRESSURE: 77 MMHG | TEMPERATURE: 98 F | HEIGHT: 63 IN

## 2025-05-02 PROBLEM — R93.1 ABNORMAL NUCLEAR CARDIAC IMAGING TEST: Status: ACTIVE | Noted: 2025-05-02

## 2025-05-02 PROCEDURE — 99238 HOSP IP/OBS DSCHRG MGMT 30/<: CPT | Performed by: INTERNAL MEDICINE

## 2025-05-02 PROCEDURE — 2700000000 HC OXYGEN THERAPY PER DAY

## 2025-05-02 PROCEDURE — 96372 THER/PROPH/DIAG INJ SC/IM: CPT

## 2025-05-02 PROCEDURE — 6370000000 HC RX 637 (ALT 250 FOR IP)

## 2025-05-02 PROCEDURE — 94761 N-INVAS EAR/PLS OXIMETRY MLT: CPT

## 2025-05-02 PROCEDURE — G0378 HOSPITAL OBSERVATION PER HR: HCPCS

## 2025-05-02 PROCEDURE — 99223 1ST HOSP IP/OBS HIGH 75: CPT | Performed by: INTERNAL MEDICINE

## 2025-05-02 PROCEDURE — 6360000002 HC RX W HCPCS

## 2025-05-02 PROCEDURE — 2500000003 HC RX 250 WO HCPCS

## 2025-05-02 PROCEDURE — 6370000000 HC RX 637 (ALT 250 FOR IP): Performed by: INTERNAL MEDICINE

## 2025-05-02 RX ORDER — NITROGLYCERIN 0.4 MG/1
0.4 TABLET SUBLINGUAL EVERY 5 MIN PRN
Qty: 25 TABLET | Refills: 0 | Status: SHIPPED | OUTPATIENT
Start: 2025-05-02

## 2025-05-02 RX ADMIN — LISINOPRIL 20 MG: 20 TABLET ORAL at 09:48

## 2025-05-02 RX ADMIN — LEVOTHYROXINE SODIUM 75 MCG: 0.05 TABLET ORAL at 05:29

## 2025-05-02 RX ADMIN — PANTOPRAZOLE SODIUM 40 MG: 40 TABLET, DELAYED RELEASE ORAL at 09:48

## 2025-05-02 RX ADMIN — ESCITALOPRAM OXALATE 10 MG: 10 TABLET ORAL at 09:48

## 2025-05-02 RX ADMIN — ATORVASTATIN CALCIUM 10 MG: 10 TABLET, FILM COATED ORAL at 09:48

## 2025-05-02 RX ADMIN — HYDROCHLOROTHIAZIDE 25 MG: 25 TABLET ORAL at 09:48

## 2025-05-02 RX ADMIN — ENOXAPARIN SODIUM 40 MG: 100 INJECTION SUBCUTANEOUS at 09:47

## 2025-05-02 RX ADMIN — ASPIRIN 81 MG: 81 TABLET, CHEWABLE ORAL at 09:48

## 2025-05-02 RX ADMIN — CETIRIZINE HYDROCHLORIDE 10 MG: 10 TABLET ORAL at 09:48

## 2025-05-02 RX ADMIN — Medication 10 ML: at 09:48

## 2025-05-02 RX ADMIN — CELECOXIB 100 MG: 100 CAPSULE ORAL at 09:47

## 2025-05-02 RX ADMIN — POLYETHYLENE GLYCOL (3350) 17 G: 17 POWDER, FOR SOLUTION ORAL at 09:56

## 2025-05-02 NOTE — FLOWSHEET NOTE
05/02/25 1203   Vital Signs   Temp 98 °F (36.7 °C)   Temp Source Oral   Pulse 81   Heart Rate Source Monitor   Respirations 16   /77   MAP (Calculated) 95   BP Location Right upper arm   BP Method Automatic   Patient Position Sitting   Oxygen Therapy   SpO2 92 %   O2 Device None (Room air)     Patient remains stable. Call light within reach

## 2025-05-02 NOTE — DISCHARGE INSTR - COC
Continuity of Care Form    Patient Name: Nayeli Gottlieb   :  1950  MRN:  [id number]    Admit date:  2025  Discharge date:  ***    Code Status Order: Full Code   Advance Directives:     Admitting Physician:  Dasha Murphy DO  PCP: Talisha Soriano APRN - CNP    Discharging Nurse: ***  Discharging Hospital Unit/Room#: /0317-01  Discharging Unit Phone Number: ***    Emergency Contact:   Extended Emergency Contact Information  Primary Emergency Contact: Adelita Stafford  Address: [address] [place] [address]  Home Phone: [phone]  Mobile Phone: [phone]  Relation: Child    Past Surgical History:  Past Surgical History:   Procedure Laterality Date    BRONCHOSCOPY       SECTION      LUNG BIOPSY      SHOULDER ARTHROSCOPY Left 2020    LEFT SHOULDER VIDEO ARTHROSCOPY, DISTAL CLAVICLE EXCISION, SUBACROMIAL DECOMPRESSION, ROTATOR CUFF REPAIR WITH ROTATION MEDICAL GRAFT performed by Alice Ferguson DO at [facility] OR    SHOULDER SURGERY Left 2020    LEFT SHOULDER VIDEO ARTHROSCOPY, DISTAL CLAVICLE EXCISION, SUBACROMIAL DECOMPRESSION, ROTATOR CUFF REPAIR WITH ROTATION MEDICAL GRAFT       Immunization History:   Immunization History   Administered Date(s) Administered    COVID-19, MODERNA BLUE border, Primary or Immunocompromised, (age 12y+), IM, 100 mcg/0.5mL 2021, 2021    Influenza, FLUAD, (age 65 y+), IM, Trivalent PF, 0.5mL 2019    Influenza, FLUZONE High Dose, (age 65 y+), IM, Trivalent PF, 0.5mL 08/15/2017, 2019, 2019    Pneumococcal, PCV-13, PREVNAR 13, (age 6w+), IM, 0.5mL 2017    Pneumococcal, PPSV23, PNEUMOVAX 23, (age 2y+), SC/IM, 0.5mL 2018    TDaP, ADACEL (age 10y-64y), BOOSTRIX (age 10y+), IM, 0.5mL 2017       Active Problems:  Patient Active Problem List   Diagnosis Code    Sarcoid D86.9    Shortness of breath R06.02    HTN (hypertension) I10    GERD (gastroesophageal reflux disease)

## 2025-05-02 NOTE — FLOWSHEET NOTE
05/02/25 0738   Vital Signs   Temp 98 °F (36.7 °C)   Temp Source Oral   Pulse 73   Heart Rate Source Monitor   Respirations 17   BP (!) 116/58   MAP (Calculated) 77   BP Location Right upper arm   BP Method Automatic   Patient Position Semi fowlers   Oxygen Therapy   SpO2 96 %   O2 Device Nasal cannula   O2 Flow Rate (L/min) 2 L/min     Shift assessment completed. See flow sheet. Medications given. Patient is A&O x4. Vitals are stable. Patient is now on RA. Call light within reach.

## 2025-05-02 NOTE — PROGRESS NOTES
Patient educated on discharge instructions as well as new medications use, dosage, administration and possible side effects.  Patient verified knowledge. IV removed without difficulty and dry dressing in place. Telemetry monitor removed and returned to [facility]. Pt left facility in stable condition to Home with all of their personal belongings.     What Is The Patient's Gender: Female

## 2025-05-02 NOTE — CARE COORDINATION
DISCHARGE ORDER  Date/Time 2025 1:03 PM  Completed by: Jessenia Sesay RN, Case Management    Patient Name: Nayeli Gottlieb      : 1950  Admitting Diagnosis: Shortness of breath [R06.02]  Chest pain [R07.9]  Hypoxia [R09.02]  Chest pain, unspecified type [R07.9]      Admit order Date and Status:25  (verify MD's last order for status of admission)      Noted discharge order.   If applicable PT/OT recommendation at Discharge: na  DME recommendation by PT/OT:na    Discharge Plan: Pt to dc home.  No CM needs this admission.     Date of Last IMM Given: svitlana- MOON    Reviewed chart.  Role of discharge planner explained and patient verbalized understanding. Discharge order is noted.    Has Home O2 in place on admit:  No  Informed of need to bring portable home O2 tank on day of discharge for nursing to connect prior to leaving:   Not Indicated  Verbalized agreement/Understanding:   Not Indicated  Pt is being d/c'd to home today. Pt's O2 sats are 92% on ra.    Discharge timeout done with CM/Pt/RN. All discharge needs and concerns addressed.

## 2025-05-02 NOTE — PROGRESS NOTES
Transferred care to ZACHERY Bee. Face to face bedside report given, no need voiced at this time. Pt in bed with eyes closed.  No signs of distress noted.  Call light within reach.   Denies needs.

## 2025-05-02 NOTE — PROGRESS NOTES
Consult has been called to Dr. Graves on 5/2/25. Spoke with Tiki. 8:44 AM    Karyn Orantes  5/2/2025

## 2025-05-02 NOTE — CONSULTS
[facility] [address]   CONSULTATION  [phone]        Reason for Consultation/Chief Complaint: \"I have been having chest pain.\"  Cardiology consulted for chest pain per Dasha Murphy MD  Patient was last seen by Dr. LIAM Munson on 4/8/24    History of Present Illness:    Nayeli Gottlieb is a 74 y.o. patient who presented to [facility] 4//30/24 with c/o CP.  PMH CHF, HTN, HLD, sarcoidosis of mediastinal lymph nodes in 1998 at [facility]. Prior testing: Exercise Stress test 11/15/22 negative for ischemia with EF=60%.  Echo 5/14/24 EF=55-60%; Grade I DD. Cardiac CTA 7/9/24 Calcium score 2; no significant plaque or flow limiting stenosis in coronary arteries; bronchiectatic change airways with fibrosis/honeycombing left lung base  Patient reported to ER that she has CP after walking back from bathroom 3 days ago; sharp, stabbing, left CP; heavy in middle also; constant x 8 hours; no modifying factors; no assoc symptoms. Also with some SOB and fatigue. Feels better now.  Admission Testing:  EKG noted SB 59; LVH; inferior infarct possible; ST change inferior consider ischemia;  Repeat EKG noted SB rate 48 bpm; with sinus arrhythmia; 1st degree AVB; NST change (inferior ST change and infarct pattern resolved); CXR negative. Chest CT negative PE; mild main pulmonary artery dilatation; stable pulmonary nodules.  LABS: , K 4.0, BUN/Cr 23/0.8, Pro-, ALT 21, AST 29, H/H 15.2/44.9, TSH 2.18 and Eric 7, 7, 7 and last 10.  Echo 5/1/25 EF=55-60%; no WMA; Grade I DD; mild LAE; mild MV thickening; mild TR; trace AI.  Yee nuc 5/1/25 c/w small prior fixed apical inferior/apical lateral/apical perfusion defect; suggetive of prior LAD vs RCA territory infarction.  No clear inducible ischemia. Patient with no c/o palpitations, dizziness, edema, or orthopnea/PND. I have been asked to provide consultation regarding further management and testing.    Past Medical History:   has a past medical history of Acromioclavicular joint  Proctocolitis    C. difficile colitis    Other chest pain    Chest pain    Hypoxia       Thank you for allowing to us to participate in the [facility] or Nayeli Gottlieb. Further evaluation will be based upon the patient's clinical course and testing results.

## 2025-05-02 NOTE — FLOWSHEET NOTE
05/01/25 1945   Vital Signs   Temp 97.5 °F (36.4 °C)   Temp Source Oral   Pulse 79   Heart Rate Source Monitor   Respirations 18   BP (!) 105/55   MAP (Calculated) 72   BP Location Right upper arm   BP Method Automatic   Patient Position Semi fowlers   Pain Assessment   Pain Assessment None - Denies Pain   Opioid-Induced Sedation   POSS Score 1   RASS   Morel Agitation Sedation Scale (RASS) 0   Oxygen Therapy   O2 Device None (Room air)     Assessment done and in flowsheets.  Patient is alert and oriented x4.  On room air - no distress.  Call light within reach.

## 2025-05-02 NOTE — DISCHARGE INSTRUCTIONS
Please follow-up with your primary care provider for:  There are 2, stable, nonspecific 5 mm or less soft tissue pulmonary  nodules right upper lobe.  Correlate with lung cancer risk or clinical  history of known malignancy.  May consider noncontrast chest CT surveillance  at 1 year.  Most commonly these are sequela of prior infectious/inflammatory  process such as granulomatous disease.

## 2025-05-02 NOTE — DISCHARGE SUMMARY
Hospital Medicine Discharge Summary    Patient: Nayeli Gottlieb     Gender: female  : 1950   Age: 74 y.o.  MRN: [id number]    Admitting Physician: Dasha Murphy DO  Discharge Physician: Dasha Murphy DO    Code Status: Full Code     Admit Date: 2025   Discharge Date: 2025      Discharge Diagnoses:    Active Hospital Problems    Diagnosis Date Noted    Abnormal nuclear cardiac imaging test [R93.1] 2025    Chest pain [R07.9] 2025    Hypoxia [R09.02] 2025    Acquired hypothyroidism [E03.9] 2018    GERD (gastroesophageal reflux disease) [K21.9] 03/15/2010    HTN (hypertension) [I10] 03/15/2010    Shortness of breath [R06.02] 03/15/2010     Condition at Discharge: Stable    Hospital Course:     Chest pain.  Acute hypoxia.  -requiring 2L O2 due to SPO2 88%, no O2 requirement at baseline.  -has had significant hypoxia/cardiac workup over the last year:  -normal PFTs, Calcium score: 2, echo with normal EF.  -elevated d-dimer, CT PE with no PE, does show pulmonary artery dilatation.   -trop negative x2, trend.  -EKG with bradycardia and nonspecific T wave abnormality in inferior leads.   -echo and stress test reviewed, no further cardiac work-up indicated at this time. Prior cath result reviewed  -daily home ASA and statin.  -Cardiology consulted. Microvascular disease or Cardiac Syndrome X contributing to CP  -Weaned to room air     Concern for Bradycardia.  -HR was 59  -Resume metoprolol  -outpatient eval sleep apnea     Chronic diastolic CHF.  -does not appear to be fluid overloaded.   -.  -echo 2024 EF 55-60%, grade I DD.  -continue Zestoretic.  -I/Os, daily weights.      Hx sarcoidosis.  -may be contributing to above.  -reports dx in  and has not ever had any issues.     Hypertension.  -continue home Zestoretic.      Hyperlipidemia.   -continue statin.      Hypothyroidism.  -continue home synthroid.       Pulmonary nodules.  -seen on CT PE, stable.  -continue outpatient  follow up for surveillance.      Chronic back pain.  -follows with [facility].  -recently put on Celebrex, gabapentin, and as needed Robaxin.  -has not taken Robaxin in a few weeks due to it making her significantly sleepy.     GERD.  -continue PPI.       Additional findings or notes to primary provider:  There are 2, stable, nonspecific 5 mm or less soft tissue pulmonary  nodules right upper lobe.  Correlate with lung cancer risk or clinical  history of known malignancy.  May consider noncontrast chest CT surveillance  at 1 year.  Most commonly these are sequela of prior infectious/inflammatory  process such as granulomatous disease    Discharge Medications:   Current Discharge Medication List        START taking these medications    Details   nitroGLYCERIN (NITROSTAT) 0.4 MG SL tablet Place 1 tablet under the tongue every 5 minutes as needed for Chest pain up to max of 3 total doses. If no relief after 1 dose, call 911.  Qty: 25 tablet, Refills: 0           Current Discharge Medication List        Current Discharge Medication List        CONTINUE these medications which have NOT CHANGED    Details   pantoprazole (PROTONIX) 40 MG tablet Take 1 tablet by mouth daily      cetirizine (ZYRTEC) 10 MG tablet Take 1 tablet by mouth daily      gabapentin (NEURONTIN) 300 MG capsule Take 1 capsule by mouth nightly.      metoprolol tartrate (LOPRESSOR) 25 MG tablet Take 2 tablets by mouth 2 times daily  Qty: 360 tablet, Refills: 3      lisinopril-hydroCHLOROthiazide (PRINZIDE;ZESTORETIC) 20-25 MG per tablet       montelukast (SINGULAIR) 10 MG tablet TAKE 1 TABLET NIGHTLY  Qty: 90 tablet, Refills: 3      simvastatin (ZOCOR) 20 MG tablet TAKE 1 TABLET NIGHTLY  Qty: 90 tablet, Refills: 1    Associated Diagnoses: Mixed hyperlipidemia      levothyroxine (SYNTHROID) 75 MCG tablet TAKE 1 TABLET DAILY  Qty: 90 tablet, Refills: 3      escitalopram (LEXAPRO) 10 MG tablet TAKE 1 TABLET DAILY  Qty: 90 tablet, Refills: 3

## 2025-05-02 NOTE — PLAN OF CARE
Problem: Chronic Conditions and Co-morbidities  Goal: Patient's chronic conditions and co-morbidity symptoms are monitored and maintained or improved  Outcome: Progressing  Flowsheets (Taken 5/1/2025 2316)  Care Plan - Patient's Chronic Conditions and Co-Morbidity Symptoms are Monitored and Maintained or Improved: Monitor and assess patient's chronic conditions and comorbid symptoms for stability, deterioration, or improvement     Problem: Respiratory - Adult  Goal: Achieves optimal ventilation and oxygenation  Outcome: Progressing  Flowsheets (Taken 5/1/2025 2315)  Achieves optimal ventilation and oxygenation:   Assess for changes in respiratory status   Assess for changes in mentation and behavior   Position to facilitate oxygenation and minimize respiratory effort

## 2025-05-05 ENCOUNTER — TELEPHONE (OUTPATIENT)
Dept: CARDIOLOGY CLINIC | Age: 75
End: 2025-05-05

## 2025-05-05 ENCOUNTER — TELEPHONE (OUTPATIENT)
Dept: PULMONOLOGY | Age: 75
End: 2025-05-05

## 2025-05-05 RX ORDER — METOPROLOL TARTRATE 25 MG/1
50 TABLET, FILM COATED ORAL 2 TIMES DAILY
Qty: 360 TABLET | Refills: 0 | Status: SHIPPED | OUTPATIENT
Start: 2025-05-05

## 2025-05-05 NOTE — TELEPHONE ENCOUNTER
Pt was recently in the hospital and states while she was in she was told she needs night time oxygen. Pt has not been seen since 2023. Please advise how to proceed.

## 2025-05-05 NOTE — TELEPHONE ENCOUNTER
She has seen Dr. Benson in 2023. I referred her to his office.  She JOSE.  His number was given.

## 2025-05-05 NOTE — TELEPHONE ENCOUNTER
Pt called office and stated that she just got out of the hospital, but when she was in the hospital, they stated she needs oxygen at night. Pt is not sure how to get it, does she need a sleep study referral? Or is [facility] able to put in an order for overnight oxygen. Please advise. Thank you!   Best call back:[phone]

## 2025-05-05 NOTE — TELEPHONE ENCOUNTER
Called patient to inform her that she did not have any pulmonary issues while in the hospital and we cannot just get give her oxygen on just the word of the hospital. Patient verbalized understanding. Patient is scheduled for a follow up appt

## 2025-06-04 ENCOUNTER — OFFICE VISIT (OUTPATIENT)
Dept: CARDIOLOGY CLINIC | Age: 75
End: 2025-06-04
Payer: MEDICARE

## 2025-06-04 VITALS
BODY MASS INDEX: 35.08 KG/M2 | DIASTOLIC BLOOD PRESSURE: 70 MMHG | SYSTOLIC BLOOD PRESSURE: 122 MMHG | HEIGHT: 63 IN | OXYGEN SATURATION: 94 % | HEART RATE: 59 BPM | WEIGHT: 198 LBS

## 2025-06-04 DIAGNOSIS — I50.32 CHRONIC DIASTOLIC CONGESTIVE HEART FAILURE (HCC): ICD-10-CM

## 2025-06-04 DIAGNOSIS — K80.20 GALLSTONES: Primary | ICD-10-CM

## 2025-06-04 DIAGNOSIS — I10 PRIMARY HYPERTENSION: ICD-10-CM

## 2025-06-04 DIAGNOSIS — E78.2 MIXED HYPERLIPIDEMIA: ICD-10-CM

## 2025-06-04 DIAGNOSIS — R07.9 CHEST PAIN, UNSPECIFIED TYPE: ICD-10-CM

## 2025-06-04 PROCEDURE — 3074F SYST BP LT 130 MM HG: CPT | Performed by: INTERNAL MEDICINE

## 2025-06-04 PROCEDURE — 1124F ACP DISCUSS-NO DSCNMKR DOCD: CPT | Performed by: INTERNAL MEDICINE

## 2025-06-04 PROCEDURE — 3078F DIAST BP <80 MM HG: CPT | Performed by: INTERNAL MEDICINE

## 2025-06-04 PROCEDURE — 1160F RVW MEDS BY RX/DR IN RCRD: CPT | Performed by: INTERNAL MEDICINE

## 2025-06-04 PROCEDURE — 99214 OFFICE O/P EST MOD 30 MIN: CPT | Performed by: INTERNAL MEDICINE

## 2025-06-04 PROCEDURE — 1159F MED LIST DOCD IN RCRD: CPT | Performed by: INTERNAL MEDICINE

## 2025-06-04 RX ORDER — METOPROLOL TARTRATE 25 MG/1
50 TABLET, FILM COATED ORAL 2 TIMES DAILY
Qty: 360 TABLET | Refills: 3 | Status: SHIPPED | OUTPATIENT
Start: 2025-06-04

## 2025-06-04 NOTE — PROGRESS NOTES
[facility] Office Note  6/4/2025   Subjective:  Ms. Gottlieb is here  for  HTN, HLD, sarcoidosis of mediastinal lymph nodes in 1998 at [facility] but we have no access to those.  She follows with pulmonologist Ruddy Benson   C/o chest pressure.    HPI:   OV 4/8/24 amlodipine was stopped.  Metoprolol tartrate 25 mg BID was started.      CTA 7/9/24 Calcium score 2 with no significant plaque.     Admitted 4/30/25 CP and hypoxia.  Yee Nuc 5/1/25 Small area, mild intensity, fixed apical inferior/apical lateral/apical Perfusion defect. Associated apical hypokinesis. Findings are consistent with small prior apical infarction. Inferior wall attenuation artifact. No clear inducible ischemia.  Echo 5/1/25 EF=55-60% Trace AR.  LA mildly dilated.  Mild MR/TR.       Today, 6/4/25 she reports the chest pain in April was sharp and started when she was sitting in her chair.  She did not have any sweating or nausea.  The pain was worse with breathing.  She denies any pain with swallowing or history of ulcers.  The came back at bedtime and lasted 12 hours.  She had chest pressure again on Sunday.  She denies any abdominal pain.  She eats sausage and does not have any pain afterward.  Her chest pain feels like she swallowed a life saver and occurs later in the day. Patient denies current edema, shortness of breath, palpitations, dizziness or syncope. Patient is taking all cardiac medications as prescribed and tolerates them well.    She has h/o gall stones never seen a surgeon.   PMH:  she was not able to do Cardiac MRI as  advised by Dr Bolden due to claustrophobia.  She was diagnosed with sarcoidosis in 1998.     12 point ROS negative in all areas as listed below except as in [place]  Constitutional, EENT,  GI, , Musculoskeletal, skin, neurological, hematological, endocrine, Psychiatric      Reviewed past medical history, social, and family history.   Smoking: denies  Alcohol:no  Recreational Drugs:no  Family History:   -dad

## 2025-06-04 NOTE — PATIENT INSTRUCTIONS
Plan:  Labs reviewed in epic and discussed with patient.  Current medications reviewed.  Refills given as warranted.  Referral given to Dr. Alegria to look at your gallbladder.    Call [phone] to schedule an ultrasound of your gallbladder.  Call [phone] to schedule a CT calcium Score  I will personally review your tests and then I will have my staff call you with the results.   How to take Nitroglycerine under your tongue instructions.    -Nitro can give you a headache or make you dizzy.   -Make sure you are sitting down before taking any doses.  -Take 1 Nitroglycerin every 5 min for a total of 3 doses.    -If chest pain does not go away: chew 4 baby aspirin 81  mg or 1 adult aspirin 325 mg and call [phone].      Follow up with me in 1 year. Call in November to make your next appointment

## 2025-06-19 ENCOUNTER — HOSPITAL ENCOUNTER (OUTPATIENT)
Dept: CT IMAGING | Age: 75
Discharge: HOME | End: 2025-06-19
Attending: INTERNAL MEDICINE
Payer: MEDICARE

## 2025-06-19 ENCOUNTER — RESULTS FOLLOW-UP (OUTPATIENT)
Dept: CARDIOLOGY CLINIC | Age: 75
End: 2025-06-19

## 2025-06-19 ENCOUNTER — HOSPITAL ENCOUNTER (OUTPATIENT)
Dept: ULTRASOUND IMAGING | Age: 75
Discharge: HOME | End: 2025-06-19
Attending: INTERNAL MEDICINE
Payer: MEDICARE

## 2025-06-19 DIAGNOSIS — K80.20 GALLSTONES: ICD-10-CM

## 2025-06-19 DIAGNOSIS — R07.9 CHEST PAIN, UNSPECIFIED TYPE: ICD-10-CM

## 2025-06-19 PROCEDURE — 76705 ECHO EXAM OF ABDOMEN: CPT

## 2025-06-19 PROCEDURE — 75571 CT HRT W/O DYE W/CA TEST: CPT

## 2025-06-23 ENCOUNTER — INITIAL CONSULT (OUTPATIENT)
Dept: SURGERY | Age: 75
End: 2025-06-23
Payer: MEDICARE

## 2025-06-23 VITALS
WEIGHT: 198 LBS | DIASTOLIC BLOOD PRESSURE: 68 MMHG | BODY MASS INDEX: 35.08 KG/M2 | SYSTOLIC BLOOD PRESSURE: 118 MMHG | HEIGHT: 63 IN

## 2025-06-23 DIAGNOSIS — K80.20 CALCULUS OF GALLBLADDER WITHOUT CHOLECYSTITIS WITHOUT OBSTRUCTION: Primary | ICD-10-CM

## 2025-06-23 PROCEDURE — 99204 OFFICE O/P NEW MOD 45 MIN: CPT | Performed by: SURGERY

## 2025-06-23 PROCEDURE — 1124F ACP DISCUSS-NO DSCNMKR DOCD: CPT | Performed by: SURGERY

## 2025-06-23 PROCEDURE — 1126F AMNT PAIN NOTED NONE PRSNT: CPT | Performed by: SURGERY

## 2025-06-23 PROCEDURE — 3074F SYST BP LT 130 MM HG: CPT | Performed by: SURGERY

## 2025-06-23 PROCEDURE — 3078F DIAST BP <80 MM HG: CPT | Performed by: SURGERY

## 2025-06-23 PROCEDURE — 1159F MED LIST DOCD IN RCRD: CPT | Performed by: SURGERY

## 2025-07-01 NOTE — PROGRESS NOTES
Failure Neg Hx        ROS:  She reports no complaints related to the eyes, ears , nose throat or mouth. She denies weight loss.  Occasional chest pain.  No SOB.  No urinary complaints.  No musculoskeletal complaints.  No skin rashes.  No neurologic deficits.  No bleeding tendencies.  GI complaints include none.    Physical Exam:  Vitals:    06/23/25 1252   BP: 118/68     General:  Comfortable.  No distress.   Eyes:  No scleral icterus  Ears:  Normal  Nose:  Normal  Mouth:  Mucous membranes moist  Respiratory: Lungs CTA.  No accessory muscle use.  Heart:  Regular rhythm  Abdomen:  Soft.  Non distended.  Non tender.   Musculoskeletal:  No abnormal movements. ROM extremities normal.  Skin:  No rashes.  Neurologic:  No focal deficits.  Psychiatric:  AAA. O x 3.    Radiographic studies:  CTA cardiac low risk.  GBUS with stones.      ASSESSMENT:  Encounter Diagnosis   Name Primary?    Calculus of gallbladder without cholecystitis without obstruction Yes           PLAN:  Long discussion about gallstones and indication for surgery is only with symptomatic gallstones.  We both agree that her current symptom profile has not reached the confidence level to pursue gallbladder surgery.  She will observe and follow up with me as needed.

## 2025-08-25 ENCOUNTER — OFFICE VISIT (OUTPATIENT)
Dept: PULMONOLOGY | Age: 75
End: 2025-08-25
Payer: MEDICARE

## 2025-08-25 ENCOUNTER — TELEPHONE (OUTPATIENT)
Dept: PULMONOLOGY | Age: 75
End: 2025-08-25

## 2025-08-25 VITALS
BODY MASS INDEX: 36.25 KG/M2 | HEIGHT: 62 IN | SYSTOLIC BLOOD PRESSURE: 138 MMHG | OXYGEN SATURATION: 97 % | DIASTOLIC BLOOD PRESSURE: 80 MMHG | RESPIRATION RATE: 17 BRPM | WEIGHT: 197 LBS | HEART RATE: 60 BPM

## 2025-08-25 DIAGNOSIS — I50.9 CONGESTIVE HEART FAILURE, UNSPECIFIED HF CHRONICITY, UNSPECIFIED HEART FAILURE TYPE (HCC): ICD-10-CM

## 2025-08-25 DIAGNOSIS — R09.02 HYPOXIA: Primary | ICD-10-CM

## 2025-08-25 PROCEDURE — 3075F SYST BP GE 130 - 139MM HG: CPT | Performed by: INTERNAL MEDICINE

## 2025-08-25 PROCEDURE — 1159F MED LIST DOCD IN RCRD: CPT | Performed by: INTERNAL MEDICINE

## 2025-08-25 PROCEDURE — 99214 OFFICE O/P EST MOD 30 MIN: CPT | Performed by: INTERNAL MEDICINE

## 2025-08-25 PROCEDURE — 3079F DIAST BP 80-89 MM HG: CPT | Performed by: INTERNAL MEDICINE

## 2025-08-25 PROCEDURE — 1124F ACP DISCUSS-NO DSCNMKR DOCD: CPT | Performed by: INTERNAL MEDICINE

## (undated) DEVICE — FLUID CONTROL SHOULDER PACK: Brand: CONVERTORS

## (undated) DEVICE — BLADE SURG NO11 S STL STR DISP GLASSVAN

## (undated) DEVICE — 3M™ COBAN™ NL STERILE NON-LATEX SELF-ADHERENT WRAP, 2084S, 4 IN X 5 YD (10 CM X 4,5 M), 18 ROLLS/CASE: Brand: 3M™ COBAN™

## (undated) DEVICE — MASK ADLT DISP

## (undated) DEVICE — MAJOR SET UP PK

## (undated) DEVICE — TUBING PMP L8FT LNG W/ CONN FOR AR-6400 REDEUCE

## (undated) DEVICE — 3M™ STERI-DRAPE™ U-DRAPE 1015: Brand: STERI-DRAPE™

## (undated) DEVICE — CHLORAPREP 26ML ORANGE

## (undated) DEVICE — SUTURE ETHLN SZ 3-0 L30IN NONABSORBABLE BLK FSL L30MM 3/8 1671H

## (undated) DEVICE — BUR SHV L13CM DIA55MM 12 FLUT OVL AGG COOLCUT

## (undated) DEVICE — NEEDLE SPNL WEISS LNG 18 GAX5 IN MOD TUOHY PT TW PERISAFE

## (undated) DEVICE — BLADE SHV L13CM DIA4MM EXCALIBUR AGG COOLCUT

## (undated) DEVICE — ABDOMINAL PAD: Brand: DERMACEA

## (undated) DEVICE — DRAPE,U/SHT,SPLIT,FILM,60X84,STERILE: Brand: MEDLINE

## (undated) DEVICE — WEREWOLF FLOW 90 COBLATION WAND: Brand: COBLATION

## (undated) DEVICE — BLADE SHV L13CM DIA5.5MM BNE CUT AGG DEB COOLCUT

## (undated) DEVICE — GAUZE,SPONGE,4"X4",8PLY,STRL,LF,10/TRAY: Brand: MEDLINE

## (undated) DEVICE — GOWN SIRUS NONREIN XL W/TWL: Brand: MEDLINE INDUSTRIES, INC.

## (undated) DEVICE — SOLUTION IV IRRIG 500ML 0.9% SODIUM CHL 2F7123

## (undated) DEVICE — MAT TRACK 40 X 72 IN ABSORBENT

## (undated) DEVICE — SLING ARM POST OPERATIVE LG GRY ULTRASLING

## (undated) DEVICE — 1010 S-DRAPE TOWEL DRAPE 10/BX: Brand: STERI-DRAPE™

## (undated) DEVICE — CANNULA THREADED FLEX 8.0 X 72MM: Brand: CLEAR-TRAC

## (undated) DEVICE — CANNULA THREADED FLEX 6.5 X 72MM: Brand: CLEAR-TRAC

## (undated) DEVICE — MEDI-VAC NON-CONDUCTIVE SUCTION TUBING: Brand: CARDINAL HEALTH

## (undated) DEVICE — DRESSING,GAUZE,XEROFORM,CURAD,1"X8",ST: Brand: CURAD

## (undated) DEVICE — SHOULDER STABILIZATION KIT,                                    DISPOSABLE 12 PER BOX

## (undated) DEVICE — GLOVE ORANGE PI 7 1/2   MSG9075